# Patient Record
Sex: FEMALE | Race: WHITE | Employment: UNEMPLOYED | URBAN - METROPOLITAN AREA
[De-identification: names, ages, dates, MRNs, and addresses within clinical notes are randomized per-mention and may not be internally consistent; named-entity substitution may affect disease eponyms.]

---

## 2018-08-23 DIAGNOSIS — K21.9 GASTROESOPHAGEAL REFLUX DISEASE, ESOPHAGITIS PRESENCE NOT SPECIFIED: ICD-10-CM

## 2018-08-23 PROBLEM — R42 DIZZINESS: Status: ACTIVE | Noted: 2018-08-23

## 2018-08-23 PROBLEM — R06.00 DYSPNEA: Status: ACTIVE | Noted: 2018-08-23

## 2018-08-23 RX ORDER — FOLIC ACID 1 MG/1
TABLET ORAL DAILY
COMMUNITY
End: 2019-09-27 | Stop reason: ALTCHOICE

## 2018-08-23 RX ORDER — AMOXICILLIN 250 MG
1 CAPSULE ORAL DAILY
COMMUNITY
End: 2019-09-27 | Stop reason: ALTCHOICE

## 2018-08-23 RX ORDER — BISMUTH SUBSALICYLATE 262 MG
1 TABLET,CHEWABLE ORAL DAILY
COMMUNITY
End: 2019-09-27 | Stop reason: ALTCHOICE

## 2018-08-23 RX ORDER — MONTELUKAST SODIUM 10 MG/1
10 TABLET ORAL DAILY
COMMUNITY
End: 2019-09-27 | Stop reason: ALTCHOICE

## 2018-08-23 RX ORDER — OXYCODONE AND ACETAMINOPHEN 5; 325 MG/1; MG/1
TABLET ORAL
COMMUNITY
End: 2019-09-27 | Stop reason: ALTCHOICE

## 2018-08-23 RX ORDER — LORATADINE AND PSEUDOEPHEDRINE 10; 240 MG/1; MG/1
1 TABLET, EXTENDED RELEASE ORAL DAILY
COMMUNITY
End: 2019-09-27 | Stop reason: ALTCHOICE

## 2018-09-06 ENCOUNTER — OFFICE VISIT (OUTPATIENT)
Dept: FAMILY MEDICINE CLINIC | Age: 28
End: 2018-09-06

## 2018-09-06 VITALS
DIASTOLIC BLOOD PRESSURE: 74 MMHG | SYSTOLIC BLOOD PRESSURE: 108 MMHG | BODY MASS INDEX: 21.52 KG/M2 | HEIGHT: 61 IN | OXYGEN SATURATION: 96 % | HEART RATE: 122 BPM | WEIGHT: 114 LBS | TEMPERATURE: 98.4 F

## 2018-09-06 DIAGNOSIS — K29.70 GASTRITIS WITHOUT BLEEDING, UNSPECIFIED CHRONICITY, UNSPECIFIED GASTRITIS TYPE: ICD-10-CM

## 2018-09-06 DIAGNOSIS — N25.89 RTA (RENAL TUBULAR ACIDOSIS): Primary | ICD-10-CM

## 2018-09-06 RX ORDER — MINERAL OIL
ENEMA (ML) RECTAL
COMMUNITY

## 2018-09-06 RX ORDER — PROMETHAZINE HYDROCHLORIDE 12.5 MG/1
TABLET ORAL
COMMUNITY
End: 2018-09-06 | Stop reason: SDUPTHER

## 2018-09-06 RX ORDER — PROMETHAZINE HYDROCHLORIDE 12.5 MG/1
12.5 TABLET ORAL
Qty: 30 TAB | Refills: 0 | Status: SHIPPED | OUTPATIENT
Start: 2018-09-06 | End: 2018-10-16 | Stop reason: SDUPTHER

## 2018-09-06 RX ORDER — PANTOPRAZOLE SODIUM 40 MG/1
40 TABLET, DELAYED RELEASE ORAL DAILY
COMMUNITY
End: 2019-09-27 | Stop reason: ALTCHOICE

## 2018-09-06 RX ORDER — ELETRIPTAN HYDROBROMIDE 40 MG/1
40 TABLET, FILM COATED ORAL
COMMUNITY
End: 2019-09-27 | Stop reason: ALTCHOICE

## 2018-09-06 RX ORDER — ONDANSETRON 4 MG/1
4 TABLET, ORALLY DISINTEGRATING ORAL
COMMUNITY
End: 2019-09-27 | Stop reason: ALTCHOICE

## 2018-09-06 RX ORDER — SUCRALFATE 1 G/1
1 TABLET ORAL 4 TIMES DAILY
COMMUNITY
End: 2019-09-27 | Stop reason: ALTCHOICE

## 2018-09-06 RX ORDER — OXYCODONE HYDROCHLORIDE 5 MG/1
5 TABLET ORAL
COMMUNITY
End: 2019-09-27 | Stop reason: ALTCHOICE

## 2018-09-06 NOTE — PATIENT INSTRUCTIONS
Gastritis: Care Instructions Your Care Instructions Gastritis is a sore and upset stomach. It happens when something irritates the stomach lining. Many things can cause it. These include an infection such as the flu or something you ate or drank. Medicines or a sore on the lining of the stomach (ulcer) also can cause it. Your belly may bloat and ache. You may belch, vomit, and feel sick to your stomach. You should be able to relieve the problem by taking medicine. And it may help to change your diet. If gastritis lasts, your doctor may prescribe medicine. Follow-up care is a key part of your treatment and safety. Be sure to make and go to all appointments, and call your doctor if you are having problems. It's also a good idea to know your test results and keep a list of the medicines you take. How can you care for yourself at home? · If your doctor prescribed antibiotics, take them as directed. Do not stop taking them just because you feel better. You need to take the full course of antibiotics. · Be safe with medicines. If your doctor prescribed medicine to decrease stomach acid, take it as directed. Call your doctor if you think you are having a problem with your medicine. · Do not take any other medicine, including over-the-counter pain relievers, without talking to your doctor first. 
· If your doctor recommends over-the-counter medicine to reduce stomach acid, such as Pepcid AC, Prilosec, Tagamet HB, or Zantac 75, follow the directions on the label. · Drink plenty of fluids (enough so that your urine is light yellow or clear like water) to prevent dehydration. Choose water and other caffeine-free clear liquids. If you have kidney, heart, or liver disease and have to limit fluids, talk with your doctor before you increase the amount of fluids you drink. · Limit how much alcohol you drink. · Avoid coffee, tea, cola drinks, chocolate, and other foods with caffeine. They increase stomach acid. When should you call for help? Call 911 anytime you think you may need emergency care. For example, call if: 
  · You vomit blood or what looks like coffee grounds.  
  · You pass maroon or very bloody stools.  
 Call your doctor now or seek immediate medical care if: 
  · You start breathing fast and have not produced urine in the last 8 hours.  
  · You cannot keep fluids down.  
 Watch closely for changes in your health, and be sure to contact your doctor if: 
  · You do not get better as expected. Where can you learn more? Go to http://jesus-deepak.info/. Enter [id number] in the search box to learn more about \"Gastritis: Care Instructions. \" Current as of: May 12, 2017 Content Version: 11.7 © 9761-4107 [employer]. Care instructions adapted under license by [employer] (which disclaims liability or warranty for this information). If you have questions about a medical condition or this instruction, always ask your healthcare professional. [address] any warranty or liability for your use of this information.

## 2018-09-06 NOTE — PROGRESS NOTES
[address] [place]   [address] Phone:  [phone] Fax:  [phone] Name: Nancy Us 
:  1990 MRN:  [id number] Encounter Date:  2018 History of Present Illness: 
Nancy Us is a 32 y.o. female. She presents for hospital follow up for RTA type IV. She was hospitalized from  - 18 for renal tubular acidosis type IV. She has follow up scheduled with Dr. Nahid Mccoy on 10/10/18. She saw a nurse practitioner with that practice at the end of August where she says labs were checked and that the labs were looking better. She has been taking the potassium. She does occasionally get some nausea for which she takes phenergan. The phenergan use is decreasing but she could use a refill of this. She also has gastritis for which she has an endoscopy scheduled tomorrow with the GI doctor. She is taking protonix, carafate, and simethicone which helps her symptoms some. She denies bleeding and dark stool. Appetite is normal. She requests a refill of roxicodone that she got from the hospital. She says she was taking it for \"gastritis pain\" but that the use has decreased significantly to one every few days. Of note, patient notes she is always tachycardic since her hospitalization in . Looking at previous records, she has been slightly tachycardic at previous visits. She denies chest pain, shortness of breath, dizziness. Allergies Allergen Reactions  Ibuprofen Cold [Pseudoephedrine-Ibuprofen] Unable to Obtain  Lodine [Etodolac] Other (comments) Headache  Naproxen Unknown (comments)  Nsaids (Non-Steroidal Anti-Inflammatory Drug) Other (comments)  Topamax [Topiramate] Unknown (comments) Current Outpatient Prescriptions Medication Sig Dispense Refill  eletriptan (RELPAX) 40 mg tablet Take 40 mg by mouth once as needed. may repeat in 2 hours if necessary  fexofenadine (ALLEGRA) 180 mg tablet Take  by mouth.  ondansetron (ZOFRAN ODT) 4 mg disintegrating tablet Take 4 mg by mouth every eight (8) hours as needed for Nausea.  oxyCODONE IR (ROXICODONE) 5 mg immediate release tablet Take 5 mg by mouth every four (4) hours as needed for Pain.  pantoprazole (PROTONIX) 40 mg tablet Take 40 mg by mouth daily.  potassium citrate (UROCIT-K 5 PO) Take  by mouth.  simethicone (GAS-X PO) Take  by mouth.  sucralfate (CARAFATE) 1 gram tablet Take 1 g by mouth four (4) times daily.  promethazine (PHENERGAN) 12.5 mg tablet Take 1 Tab by mouth every six (6) hours as needed for Nausea. 30 Tab 0 There is no problem list on file for this patient. Past Medical History:  
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  
Procedure Laterality Date  HX CHOLECYSTECTOMY  HX FEMUR FRACTURE TX Social History Social History  Marital status: SINGLE Spouse name: N/A  
 Number of children: N/A  
 Years of education: N/A Social History Main Topics  Smoking status: Never Smoker  Smokeless tobacco: Never Used  Alcohol use No  
 Drug use: No  
 Sexual activity: Not Asked Other Topics Concern  None Social History Narrative  None Family History Problem Relation Age of Onset  Hypertension Mother Review of Systems Constitutional: Negative for chills and fever. Respiratory: Negative for cough. Cardiovascular: Negative for chest pain, palpitations and leg swelling. Gastrointestinal: Positive for nausea. Negative for abdominal pain, blood in stool, melena and vomiting. Physical Exam: 
Visit Vitals  /74 (BP 1 Location: Right arm, BP Patient Position: Sitting)  Pulse (!) 122  Temp 98.4 °F (36.9 °C) (Oral)  Ht 5' 1\" (1.549 m)  Wt 114 lb (51.7 kg)  LMP 09/01/2018  SpO2 96%  BMI 21.54 kg/m2 Recheck pulse by physician: 104 Physical Exam  
Constitutional: She is oriented to person, place, and time. She appears well-developed and well-nourished. Cardiovascular: Regular rhythm and normal heart sounds. Tachycardia present. Pulmonary/Chest: Effort normal and breath sounds normal. No respiratory distress. Abdominal: Soft. Bowel sounds are normal. There is no tenderness. Musculoskeletal: She exhibits no edema. Neurological: She is alert and oriented to person, place, and time. Reviewed: Medications, allergies, clinical lab test results and imaging results have been reviewed. Any abnormal findings have been addressed. Assessment/Plan: 
Encounter Diagnoses ICD-10-CM ICD-9-CM 1. RTA (renal tubular acidosis) N25.89 588.89  
2. Gastritis without bleeding, unspecified chronicity, unspecified gastritis type K29.70 535.50 Orders Placed This Encounter  promethazine (PHENERGAN) 12.5 mg tablet Will not prescribe roxicodone at this time. She has follow up with GI tomorrow. Diagnoses, tests, plan, and follow up discussed with patient. Patient expressed agreement and understanding. All questions were answered. Follow-up Disposition: 
Return in about 6 weeks (around 10/18/2018). Discussed with Dr. Angela Cramer who agrees. Patt Reynolds MD 
9/6/2018

## 2018-09-06 NOTE — PROGRESS NOTES
1. Have you been to the ER, urgent care clinic since your last visit? Hospitalized since your last visit? [facility] discharged August 23 
 
2. Have you seen or consulted any other health care providers outside of the [address] [place] [address] since your last visit? Include any pap smears or colon screening. Cydney, Dhiraj Nephjustyn

## 2018-09-06 NOTE — MR AVS SNAPSHOT
[address] [place], [place] [address] 
[phone] Patient: Eb Cruz MRN: [id number] FYL:[birth year] Visit Information Date & Time Provider Department Dept. Phone Encounter #  
 9/6/2018  2:00 PM Luis Fernando Cross MD [facility] Medicine [phone] [id number] Follow-up Instructions Return in about 6 weeks (around 10/18/2018). Allergies as of 9/6/2018  Review Complete On: 9/6/2018 By: Luis Fernando Cross MD  
  
 Severity Noted Reaction Type Reactions Ibuprofen Cold [Pseudoephedrine-ibuprofen]  09/06/2018    Unable to Obtain Lodine [Etodolac]  09/06/2018    Other (comments) Headache Naproxen  09/06/2018    Unknown (comments) Nsaids (Non-steroidal Anti-inflammatory Drug)  09/06/2018    Other (comments) Topamax [Topiramate]  09/06/2018    Unknown (comments) Current Immunizations  Never Reviewed No immunizations on file. Not reviewed this visit You Were Diagnosed With   
  
 Codes Comments RTA (renal tubular acidosis)    -  Primary ICD-10-CM: N25.89 ICD-9-CM: 588.89 Gastritis without bleeding, unspecified chronicity, unspecified gastritis type     ICD-10-CM: K29.70 ICD-9-CM: 535.50 Vitals BP Pulse Temp Height(growth percentile) Weight(growth percentile) LMP  
 108/74 (BP 1 Location: Right arm, BP Patient Position: Sitting) (!) 122 98.4 °F (36.9 °C) (Oral) 5' 1\" (1.549 m) 114 lb (51.7 kg) 09/01/2018 SpO2 BMI OB Status Smoking Status 96% 21.54 kg/m2 Having regular periods Never Smoker Vitals History BMI and BSA Data Body Mass Index Body Surface Area  
 21.54 kg/m 2 1.49 m 2 Preferred Pharmacy Pharmacy Name Phone [phone] Your Updated Medication List  
  
   
This list is accurate as of 9/6/18  3:20 PM.  Always use your most recent med list.  
  
  
 CARAFATE 1 gram tablet Generic drug:  sucralfate Take 1 g by mouth four (4) times daily. fexofenadine 180 mg tablet Commonly known as:  Rella Soulier Take  by mouth. GAS-X PO Take  by mouth.  
  
 promethazine 12.5 mg tablet Commonly known as:  PHENERGAN Take 1 Tab by mouth every six (6) hours as needed for Nausea. PROTONIX 40 mg tablet Generic drug:  pantoprazole Take 40 mg by mouth daily. RELPAX 40 mg tablet Generic drug:  eletriptan Take 40 mg by mouth once as needed. may repeat in 2 hours if necessary ROXICODONE 5 mg immediate release tablet Generic drug:  oxyCODONE IR Take 5 mg by mouth every four (4) hours as needed for Pain. UROCIT-K 5 PO Take  by mouth. ZOFRAN ODT 4 mg disintegrating tablet Generic drug:  ondansetron Take 4 mg by mouth every eight (8) hours as needed for Nausea. Prescriptions Sent to Pharmacy Refills  
 promethazine (PHENERGAN) 12.5 mg tablet 0 Sig: Take 1 Tab by mouth every six (6) hours as needed for Nausea. Class: Normal  
 Pharmacy: [id number] #: [phone] Route: Oral  
  
Follow-up Instructions Return in about 6 weeks (around 10/18/2018). Patient Instructions Gastritis: Care Instructions Your Care Instructions Gastritis is a sore and upset stomach. It happens when something irritates the stomach lining. Many things can cause it. These include an infection such as the flu or something you ate or drank. Medicines or a sore on the lining of the stomach (ulcer) also can cause it. Your belly may bloat and ache. You may belch, vomit, and feel sick to your stomach. You should be able to relieve the problem by taking medicine. And it may help to change your diet. If gastritis lasts, your doctor may prescribe medicine. Follow-up care is a key part of your treatment and safety. Be sure to make and go to all appointments, and call your doctor if you are having problems. It's also a good idea to know your test results and keep a list of the medicines you take. How can you care for yourself at home? · If your doctor prescribed antibiotics, take them as directed. Do not stop taking them just because you feel better. You need to take the full course of antibiotics. · Be safe with medicines. If your doctor prescribed medicine to decrease stomach acid, take it as directed. Call your doctor if you think you are having a problem with your medicine. · Do not take any other medicine, including over-the-counter pain relievers, without talking to your doctor first. 
· If your doctor recommends over-the-counter medicine to reduce stomach acid, such as Pepcid AC, Prilosec, Tagamet HB, or Zantac 75, follow the directions on the label. · Drink plenty of fluids (enough so that your urine is light yellow or clear like water) to prevent dehydration. Choose water and other caffeine-free clear liquids. If you have kidney, heart, or liver disease and have to limit fluids, talk with your doctor before you increase the amount of fluids you drink. · Limit how much alcohol you drink. · Avoid coffee, tea, cola drinks, chocolate, and other foods with caffeine. They increase stomach acid. When should you call for help? Call 911 anytime you think you may need emergency care. For example, call if: 
  · You vomit blood or what looks like coffee grounds.  
  · You pass maroon or very bloody stools.  
 Call your doctor now or seek immediate medical care if: 
  · You start breathing fast and have not produced urine in the last 8 hours.  
  · You cannot keep fluids down.  
 Watch closely for changes in your health, and be sure to contact your doctor if: 
  · You do not get better as expected. Where can you learn more? Go to http://jesus-deepak.info/. Enter [id number] in the search box to learn more about \"Gastritis: Care Instructions. \" Current as of: May 12, 2017 Content Version: 11.7 © 5851-7951 [employer]. Care instructions adapted under license by [employer] (which disclaims liability or warranty for this information). If you have questions about a medical condition or this instruction, always ask your healthcare professional. [address] any warranty or liability for your use of this information. Introducing hospitals & HEALTH SERVICES! [facility] introduces GIROPTIC patient portal. Now you can access parts of your medical record, email your doctor's office, and request medication refills online. 1. In your internet browser, go to https://"[employer]. [employer]/"[employer] 2. Click on the First Time User? Click Here link in the Sign In box. You will see the New Member Sign Up page. 3. Enter your GIROPTIC Access Code exactly as it appears below. You will not need to use this code after youve completed the sign-up process. If you do not sign up before the expiration date, you must request a new code. · GIROPTIC Access Code: [email or web address] Expires: 12/5/2018  3:20 PM 
 
4. Enter the last four digits of your Social Security Number (xxxx) and Date of Birth (mm/dd/yyyy) as indicated and click Submit. You will be taken to the next sign-up page. 5. Create a Geolab-ITt ID. This will be your GIROPTIC login ID and cannot be changed, so think of one that is secure and easy to remember. 6. Create a GIROPTIC password. You can change your password at any time. 7. Enter your Password Reset Question and Answer. This can be used at a later time if you forget your password. 8. Enter your e-mail address. You will receive e-mail notification when new information is available in [address] 9. Click Sign Up. You can now view and download portions of your medical record. 10. Click the Download Summary menu link to download a portable copy of your medical information. If you have questions, please visit the Frequently Asked Questions section of the Radius website. Remember, Radius is NOT to be used for urgent needs. For medical emergencies, dial 911. Now available from your iPhone and Android! Please provide this summary of care documentation to your next provider. If you have any questions after today's visit, please call [phone].

## 2018-09-25 LAB — CREATININE, EXTERNAL: 0.46

## 2018-09-27 LAB — CREATININE, EXTERNAL: 0.41

## 2018-10-04 LAB — CREATININE, EXTERNAL: 0.6

## 2018-10-16 ENCOUNTER — OFFICE VISIT (OUTPATIENT)
Dept: FAMILY MEDICINE CLINIC | Age: 28
End: 2018-10-16

## 2018-10-16 VITALS
HEIGHT: 61 IN | WEIGHT: 111 LBS | OXYGEN SATURATION: 96 % | DIASTOLIC BLOOD PRESSURE: 78 MMHG | TEMPERATURE: 97.8 F | RESPIRATION RATE: 2 BRPM | HEART RATE: 119 BPM | BODY MASS INDEX: 20.96 KG/M2 | SYSTOLIC BLOOD PRESSURE: 110 MMHG

## 2018-10-16 DIAGNOSIS — R10.84 GENERALIZED ABDOMINAL PAIN: ICD-10-CM

## 2018-10-16 DIAGNOSIS — R11.0 NAUSEA: Primary | ICD-10-CM

## 2018-10-16 DIAGNOSIS — R00.0 TACHYCARDIA: ICD-10-CM

## 2018-10-16 RX ORDER — POTASSIUM CITRATE 5 MEQ/1
5 TABLET, EXTENDED RELEASE ORAL DAILY
Refills: 4 | COMMUNITY
Start: 2018-10-12

## 2018-10-16 RX ORDER — SODIUM BICARBONATE 650 MG/1
TABLET ORAL
Refills: 1 | COMMUNITY
Start: 2018-10-11

## 2018-10-16 RX ORDER — OXYCODONE AND ACETAMINOPHEN 5; 325 MG/1; MG/1
1 TABLET ORAL
COMMUNITY
Start: 2018-09-27 | End: 2019-09-27 | Stop reason: ALTCHOICE

## 2018-10-16 RX ORDER — MINERAL OIL
180 ENEMA (ML) RECTAL
COMMUNITY
End: 2018-10-16

## 2018-10-16 RX ORDER — PROMETHAZINE HYDROCHLORIDE 12.5 MG/1
12.5 TABLET ORAL
COMMUNITY
End: 2019-09-27 | Stop reason: ALTCHOICE

## 2018-10-16 RX ORDER — OXYCODONE HYDROCHLORIDE 5 MG/1
5 TABLET ORAL
COMMUNITY
Start: 2018-08-23 | End: 2019-09-27 | Stop reason: ALTCHOICE

## 2018-10-16 RX ORDER — ELETRIPTAN HYDROBROMIDE 40 MG/1
40 TABLET, FILM COATED ORAL
COMMUNITY
End: 2018-10-16

## 2018-10-16 RX ORDER — PROMETHAZINE HYDROCHLORIDE 12.5 MG/1
12.5 TABLET ORAL
Qty: 30 TAB | Refills: 0 | Status: SHIPPED | OUTPATIENT
Start: 2018-10-16 | End: 2019-09-27 | Stop reason: ALTCHOICE

## 2018-10-16 RX ORDER — PANTOPRAZOLE SODIUM 40 MG/1
40 TABLET, DELAYED RELEASE ORAL
COMMUNITY
Start: 2018-07-06 | End: 2019-09-27 | Stop reason: ALTCHOICE

## 2018-10-16 NOTE — PROGRESS NOTES
10/17/2018   Chief Complaint   Patient presents with    Follow-up     Acidosis  &   Esophigitis       HPI:  Suzy Giron is a 32 y.o. female who presents to clinic today for Hospital follow up. Records from Hospital were available and were reviewed if available, otherwise request was sent by nurse at time of visit. Patient was admitted on 9/24 and discharged on 9/27 for abdominal pain. She had been admitted previously for similar symptoms. GI was consulted and patient was restarted on PPI, and a gastric emptying stud was done which was normal, symptoms improved and patient was discharged        Labs included: CBC, BMP, UA which were normal other than elevated beta hydroxybutyrate levels  Imaging included: CT Scan which was normal, gastric emptying study was normal  Treatments included: PPI  Procedures included: None      Patients past medical, surgical and family histories were reviewed. Allergies and Medications reviewed and updated. Review of Systems -   General ROS: negative for - chills or fever  Respiratory ROS: negative for - cough, shortness of breath or wheezing  Cardiovascular ROS: negative for - chest pain or palpitations  Gastrointestinal ROS: negative for - abdominal pain, constipation, diarrhea, nausea, vomiting  Neurological ROS: negative for - dizziness or headaches  Dermatological ROS: negative for - rash or skin lesion changes      Vitals:  Vitals:    10/16/18 1031   BP: 110/78   Pulse: (!) 119   Resp: (!) 2   Temp: 97.8 °F (36.6 °C)   TempSrc: Oral   SpO2: 96%   Weight: 111 lb (50.3 kg)   Height: 5' 1\" (1.549 m)     Body mass index is 20.97 kg/m². Of note RR was entered in error    Objective  General: Patient alert and oriented and in NAD  HEENT: PER/EOMI, no conjunctival pallor or scleral icterus. No thyromegaly or cervical lymphadenopathy  Heart: Tahcycardic and regular rhythm, No murmurs, rubs or gallops.    Lungs: Clear to auscultation bilaterally, no wheezing, rales or rhonchi  Abd: +BS, non-tender, non-distended  Ext: No edema  Skin: No rashes or lesions noted on exposed skin  Neuro: AAOx3  Psych: Appropriate mood and affect    No results found for this or any previous visit (from the past 24 hour(s)). Assessment and Plan:  1. Nausea    Stable, but need refill    - promethazine (PHENERGAN) 12.5 mg tablet; Take 1 Tab by mouth every six (6) hours as needed for Nausea. Dispense: 30 Tab; Refill: 0    2. Tachycardia  CHronic, has had previous extensive cardiac workup by Cardiology as inpatient during [facility], and was cleared with generally normal EKGs and echocardiograms. Pt continues to have chest pain with movement at times, will get further imaging to rule out an lung pathology    - CT CHEST W WO CONT; Future    3. Generalized abdominal pain  Stable, will follow up with GI for further workup      I have discussed the diagnosis with the patient and the intended plan as seen in the above orders. She has expressed understanding. The patient has received an after-visit summary and questions were answered concerning future plans. I have discussed medication side effects and warnings with the patient as well. Follow-up Disposition:  Return if symptoms worsen or fail to improve.     Electronically Signed: Charles Godinez MD

## 2018-10-16 NOTE — PROGRESS NOTES
Kimber Mcguire is a 32 y.o. female  Chief Complaint   Patient presents with    Follow-up     Acidosis  &   Esophigitis     1. Have you been to the ER, urgent care clinic since your last visit? Hospitalized since your last visit? YES   [place] H/O  STOMACH PAINS      2. Have you seen or consulted any other health care providers outside of the [address] [place] [address] since your last visit? Include any pap smears or colon screening. PAP 2017    Identified pt with two pt identifiers(name and ). Reviewed record in preparation for visit and have obtained necessary documentation.   Chief Complaint   Patient presents with    Follow-up     Acidosis  &   Esophigitis        Health Maintenance Due   Topic    DTaP/Tdap/Td series (1 - Tdap)    PAP AKA CERVICAL CYTOLOGY     Influenza Age 5 to Adult

## 2018-10-16 NOTE — MR AVS SNAPSHOT
[address] [id number] Patient: Marian Berumen MRN: [id number] Visit Information Date & Time Provider Department Dept. Phone Encounter #  
 10/16/2018 10:30 AM Ruchi Ryan MD [facility] [phone] [id number] Follow-up Instructions Return if symptoms worsen or fail to improve. Upcoming Health Maintenance Date Due DTaP/Tdap/Td series (1 - Tdap) 11/2/2011 PAP AKA CERVICAL CYTOLOGY 11/2/2011 Influenza Age 5 to Adult 8/1/2018 Allergies as of 10/16/2018  Review Complete On: 10/16/2018 By: Jovita Gabriel Severity Noted Reaction Type Reactions Ibuprofen Cold [Pseudoephedrine-ibuprofen]  08/23/2018    Other (comments) Ibuprofen Cold [Pseudoephedrine-ibuprofen]  09/06/2018    Unable to Obtain Lodine [Etodolac]  08/23/2018    Other (comments) Headache Lodine [Etodolac]  09/06/2018    Other (comments) Headache Naprosyn [Naproxen]  08/23/2018    Other (comments) Naproxen  09/06/2018    Unknown (comments) Nsaids (Non-steroidal Anti-inflammatory Drug)  09/06/2018    Nausea and Vomiting Topamax [Topiramate]  08/23/2018    Other (comments) Abdominal pain, Lactic Acidosis Topamax [Topiramate]  09/06/2018    Unknown (comments) Current Immunizations  Never Reviewed No immunizations on file. Not reviewed this visit You Were Diagnosed With   
  
 Codes Comments Nausea    -  Primary ICD-10-CM: R11.0 ICD-9-CM: 787.02 Tachycardia     ICD-10-CM: R00.0 ICD-9-CM: 981. 0 Vitals BP Pulse Temp Resp Height(growth percentile) Weight(growth percentile) 110/78 (!) 119 97.8 °F (36.6 °C) (Oral) (!) 2 5' 1\" (1.549 m) 111 lb (50.3 kg) SpO2 BMI OB Status Smoking Status 96% 20.97 kg/m2 Having regular periods Never Smoker Vitals History BMI and BSA Data Body Mass Index Body Surface Area 20.97 kg/m 2 1.47 m 2 Preferred Pharmacy Pharmacy Name Phone [id number] [phone] Your Updated Medication List  
  
   
This list is accurate as of 10/16/18 11:30 AM.  Always use your most recent med list.  
  
  
  
  
 CARAFATE 1 gram tablet Generic drug:  sucralfate Take 1 g by mouth four (4) times daily. CLARITIN-D 24 HOUR  mg per tablet Generic drug:  loratadine-pseudoephedrine Take 1 Tab by mouth daily. fexofenadine 180 mg tablet Commonly known as:  Gelene Backbone Take  by mouth. folic acid 1 mg tablet Commonly known as:  Google Take  by mouth daily. GAS-X PO Take  by mouth.  
  
 multivitamin tablet Commonly known as:  ONE A DAY Take 1 Tab by mouth daily. * PERCOCET 5-325 mg per tablet Generic drug:  oxyCODONE-acetaminophen Take  by mouth every four (4) hours as needed for Pain. * oxyCODONE-acetaminophen 5-325 mg per tablet Commonly known as:  PERCOCET  
1 Tab. * promethazine 12.5 mg tablet Commonly known as:  PHENERGAN  
12.5 mg.  
  
 * promethazine 12.5 mg tablet Commonly known as:  PHENERGAN Take 1 Tab by mouth every six (6) hours as needed for Nausea. * PROTONIX 40 mg tablet Generic drug:  pantoprazole Take 40 mg by mouth daily. * pantoprazole 40 mg tablet Commonly known as:  PROTONIX  
40 mg. RELPAX 40 mg tablet Generic drug:  eletriptan Take 40 mg by mouth once as needed. may repeat in 2 hours if necessary * ROXICODONE 5 mg immediate release tablet Generic drug:  oxyCODONE IR Take 5 mg by mouth every four (4) hours as needed for Pain. * oxyCODONE IR 5 mg immediate release tablet Commonly known as:  ROXICODONE  
5 mg. SENOKOT-S 8.6-50 mg per tablet Generic drug:  senna-docusate Take 1 Tab by mouth daily. SINGULAIR 10 mg tablet Generic drug:  montelukast  
 Take 10 mg by mouth daily. sodium bicarbonate 650 mg tablet TAKE 1 TABLET BY MOUTH AFTER MEALS AS DIRECTED * UROCIT-K 5 PO Take  by mouth. * potassium citrate 5 mEq (540 mg) Tber tablet Commonly known as:  UROCIT-K5 TAKE 2 TABLETS BY MOUTH WITH MEALS  
  
 ZOFRAN ODT 4 mg disintegrating tablet Generic drug:  ondansetron Take 4 mg by mouth every eight (8) hours as needed for Nausea. * Notice: This list has 10 medication(s) that are the same as other medications prescribed for you. Read the directions carefully, and ask your doctor or other care provider to review them with you. Prescriptions Sent to Pharmacy Refills  
 promethazine (PHENERGAN) 12.5 mg tablet 0 Sig: Take 1 Tab by mouth every six (6) hours as needed for Nausea. Class: Normal  
 Pharmacy: [facility]/pharmacy #[id number] #: [phone] Route: Oral  
  
Follow-up Instructions Return if symptoms worsen or fail to improve. To-Do List   
 10/17/2018 Imaging:  CT CHEST W WO CONT Patient Instructions Gastritis: Care Instructions Your Care Instructions Gastritis is a sore and upset stomach. It happens when something irritates the stomach lining. Many things can cause it. These include an infection such as the flu or something you ate or drank. Medicines or a sore on the lining of the stomach (ulcer) also can cause it. Your belly may bloat and ache. You may belch, vomit, and feel sick to your stomach. You should be able to relieve the problem by taking medicine. And it may help to change your diet. If gastritis lasts, your doctor may prescribe medicine. Follow-up care is a key part of your treatment and safety. Be sure to make and go to all appointments, and call your doctor if you are having problems. It's also a good idea to know your test results and keep a list of the medicines you take. How can you care for yourself at home? · If your doctor prescribed antibiotics, take them as directed. Do not stop taking them just because you feel better. You need to take the full course of antibiotics. · Be safe with medicines. If your doctor prescribed medicine to decrease stomach acid, take it as directed. Call your doctor if you think you are having a problem with your medicine. · Do not take any other medicine, including over-the-counter pain relievers, without talking to your doctor first. 
· If your doctor recommends over-the-counter medicine to reduce stomach acid, such as Pepcid AC, Prilosec, Tagamet HB, or Zantac 75, follow the directions on the label. · Drink plenty of fluids (enough so that your urine is light yellow or clear like water) to prevent dehydration. Choose water and other caffeine-free clear liquids. If you have kidney, heart, or liver disease and have to limit fluids, talk with your doctor before you increase the amount of fluids you drink. · Limit how much alcohol you drink. · Avoid coffee, tea, cola drinks, chocolate, and other foods with caffeine. They increase stomach acid. When should you call for help? Call 911 anytime you think you may need emergency care. For example, call if: 
  · You vomit blood or what looks like coffee grounds.  
  · You pass maroon or very bloody stools.  
 Call your doctor now or seek immediate medical care if: 
  · You start breathing fast and have not produced urine in the last 8 hours.  
  · You cannot keep fluids down.  
 Watch closely for changes in your health, and be sure to contact your doctor if: 
  · You do not get better as expected. Where can you learn more? Go to http://jesus-deepak.info/. Enter [id number] in the search box to learn more about \"Gastritis: Care Instructions. \" Current as of: March 28, 2018 Content Version: 11.8 © 9340-3123 Healthwise, Incorporated.  Care instructions adapted under license by [address] (which disclaims liability or warranty for this information). If you have questions about a medical condition or this instruction, always ask your healthcare professional. [address] any warranty or liability for your use of this information. Introducing [facility] & HEALTH SERVICES! [facility] introduces C[employer] patient portal. Now you can access parts of your medical record, email your doctor's office, and request medication refills online. 1. In your internet browser, go to [email or web address] 2. Click on the First Time User? Click Here link in the Sign In box. You will see the New Member Sign Up page. 3. Enter your Cinexio Access Code exactly as it appears below. You will not need to use this code after youve completed the sign-up process. If you do not sign up before the expiration date, you must request a new code. · Cine[employer] Access Code: [email or web address] Expires: 12/5/2018  3:20 PM 
 
4. Enter the last four digits of your Social Security Number (xxxx) and Date of Birth (mm/dd/yyyy) as indicated and click Submit. You will be taken to the next sign-up page. 5. Create a Cinexio ID. This will be your Cinexio login ID and cannot be changed, so think of one that is secure and easy to remember. 6. Create a [employer] password. You can change your password at any time. 7. Enter your Password Reset Question and Answer. This can be used at a later time if you forget your password. 8. Enter your e-mail address. You will receive e-mail notification when new information is available in [address] 9. Click Sign Up. You can now view and download portions of your medical record. 10. Click the Download Summary menu link to download a portable copy of your medical information. If you have questions, please visit the Frequently Asked Questions section of the [employer] website.  Remember, [employer]io is NOT to be used for urgent needs. For medical emergencies, dial 911. Now available from your iPhone and Android! Please provide this summary of care documentation to your next provider. If you have any questions after today's visit, please call [phone].

## 2018-10-16 NOTE — PATIENT INSTRUCTIONS
Gastritis: Care Instructions  Your Care Instructions    Gastritis is a sore and upset stomach. It happens when something irritates the stomach lining. Many things can cause it. These include an infection such as the flu or something you ate or drank. Medicines or a sore on the lining of the stomach (ulcer) also can cause it. Your belly may bloat and ache. You may belch, vomit, and feel sick to your stomach. You should be able to relieve the problem by taking medicine. And it may help to change your diet. If gastritis lasts, your doctor may prescribe medicine. Follow-up care is a key part of your treatment and safety. Be sure to make and go to all appointments, and call your doctor if you are having problems. It's also a good idea to know your test results and keep a list of the medicines you take. How can you care for yourself at home? · If your doctor prescribed antibiotics, take them as directed. Do not stop taking them just because you feel better. You need to take the full course of antibiotics. · Be safe with medicines. If your doctor prescribed medicine to decrease stomach acid, take it as directed. Call your doctor if you think you are having a problem with your medicine. · Do not take any other medicine, including over-the-counter pain relievers, without talking to your doctor first.  · If your doctor recommends over-the-counter medicine to reduce stomach acid, such as Pepcid AC, Prilosec, Tagamet HB, or Zantac 75, follow the directions on the label. · Drink plenty of fluids (enough so that your urine is light yellow or clear like water) to prevent dehydration. Choose water and other caffeine-free clear liquids. If you have kidney, heart, or liver disease and have to limit fluids, talk with your doctor before you increase the amount of fluids you drink. · Limit how much alcohol you drink. · Avoid coffee, tea, cola drinks, chocolate, and other foods with caffeine.  They increase stomach acid.  When should you call for help? Call 911 anytime you think you may need emergency care. For example, call if:    · You vomit blood or what looks like coffee grounds.     · You pass maroon or very bloody stools.    Call your doctor now or seek immediate medical care if:    · You start breathing fast and have not produced urine in the last 8 hours.     · You cannot keep fluids down.    Watch closely for changes in your health, and be sure to contact your doctor if:    · You do not get better as expected. Where can you learn more? Go to http://jesus-deepak.info/. Enter [id number] in the search box to learn more about \"Gastritis: Care Instructions. \"  Current as of: March 28, 2018  Content Version: 11.8  © 3087-4069 Healthwise, Incorporated. Care instructions adapted under license by [employer] (which disclaims liability or warranty for this information). If you have questions about a medical condition or this instruction, always ask your healthcare professional. [address] any warranty or liability for your use of this information.

## 2019-09-27 ENCOUNTER — OFFICE VISIT (OUTPATIENT)
Dept: FAMILY MEDICINE CLINIC | Age: 29
End: 2019-09-27

## 2019-09-27 VITALS
WEIGHT: 141 LBS | DIASTOLIC BLOOD PRESSURE: 88 MMHG | HEART RATE: 87 BPM | RESPIRATION RATE: 20 BRPM | BODY MASS INDEX: 26.62 KG/M2 | HEIGHT: 61 IN | TEMPERATURE: 98 F | OXYGEN SATURATION: 99 % | SYSTOLIC BLOOD PRESSURE: 121 MMHG

## 2019-09-27 DIAGNOSIS — J01.10 ACUTE NON-RECURRENT FRONTAL SINUSITIS: Primary | ICD-10-CM

## 2019-09-27 RX ORDER — AZITHROMYCIN 250 MG/1
TABLET, FILM COATED ORAL
Qty: 6 TAB | Refills: 0 | Status: SHIPPED | OUTPATIENT
Start: 2019-09-27 | End: 2019-10-02

## 2019-09-27 NOTE — PATIENT INSTRUCTIONS
Sinusitis: Care Instructions  Your Care Instructions    Sinusitis is an infection of the lining of the sinus cavities in your head. Sinusitis often follows a cold. It causes pain and pressure in your head and face. In most cases, sinusitis gets better on its own in 1 to 2 weeks. But some mild symptoms may last for several weeks. Sometimes antibiotics are needed. Follow-up care is a key part of your treatment and safety. Be sure to make and go to all appointments, and call your doctor if you are having problems. It's also a good idea to know your test results and keep a list of the medicines you take. How can you care for yourself at home? · Take an over-the-counter pain medicine, such as acetaminophen (Tylenol), ibuprofen (Advil, Motrin), or naproxen (Aleve). Read and follow all instructions on the label. · If the doctor prescribed antibiotics, take them as directed. Do not stop taking them just because you feel better. You need to take the full course of antibiotics. · Be careful when taking over-the-counter cold or flu medicines and Tylenol at the same time. Many of these medicines have acetaminophen, which is Tylenol. Read the labels to make sure that you are not taking more than the recommended dose. Too much acetaminophen (Tylenol) can be harmful. · Breathe warm, moist air from a steamy shower, a hot bath, or a sink filled with hot water. Avoid cold, dry air. Using a humidifier in your home may help. Follow the directions for cleaning the machine. · Use saline (saltwater) nasal washes to help keep your nasal passages open and wash out mucus and bacteria. You can buy saline nose drops at a grocery store or drugstore. Or you can make your own at home by adding 1 teaspoon of salt and 1 teaspoon of baking soda to 2 cups of distilled water. If you make your own, fill a bulb syringe with the solution, insert the tip into your nostril, and squeeze gently. [address] your nose.   · Put a hot, wet towel or a warm gel pack on your face 3 or 4 times a day for 5 to 10 minutes each time. · Try a decongestant nasal spray like oxymetazoline (Afrin). Do not use it for more than 3 days in a row. Using it for more than 3 days can make your congestion worse. When should you call for help? Call your doctor now or seek immediate medical care if:    · You have new or worse swelling or redness in your face or around your eyes.     · You have a new or higher fever.    Watch closely for changes in your health, and be sure to contact your doctor if:    · You have new or worse facial pain.     · The mucus from your nose becomes thicker (like pus) or has new blood in it.     · You are not getting better as expected. Where can you learn more? Go to http://jesus-deepak.info/. Enter N272 in the search box to learn more about \"Sinusitis: Care Instructions. \"  Current as of: October 21, 2018  Content Version: 12.2  © 3127-2203 [employer]orporated. Care instructions adapted under license by [employer] (which disclaims liability or warranty for this information). If you have questions about a medical condition or this instruction, always ask your healthcare professional. [address] [address] any warranty or liability for your use of this information.

## 2019-09-27 NOTE — PROGRESS NOTES
1. Have you been to the ER, urgent care clinic since your last visit? Hospitalized since your last visit? No    2. Have you seen or consulted any other health care providers outside of the [address] [place] [address] since your last visit? Include any pap smears or colon screening.  No

## 2019-09-27 NOTE — PROGRESS NOTES
Assessment/Plan:     Diagnoses and all orders for this visit:    1. Acute non-recurrent frontal sinusitis  -     azithromycin (ZITHROMAX) 250 mg tablet; Take 2 tablets today, then take 1 tablet daily  - Worsening, start axithromycin as directed, symptom management as discussed. RTC if symptoms do not begin to improve in the next 4-5 days. Discussed expected course/resolution/complications of diagnosis in detail with patient. Medication risks/benefits/costs/interactions/alternatives discussed with patient. Pt was given after visit summary which includes diagnoses, current medications & vitals. Pt expressed understanding with the diagnosis and plan        Subjective:      Gilbert Klein is a 29 y.o. female who presents for had concerns including Sinus Infection and Pressure Behind the Eyes. Sinus Pain  Patient complains of right ear pressure/pain, congestion, facial pain, headache described as dull and no  fever. Symptoms include facial pain with no fever, chills, or night sweats. Onset of symptoms was 1 week ago, gradually worsening since that time. She is drinking plenty of fluids. .  Past history is significant for no history of pneumonia or bronchitis. Patient is non-smoker. Takes daily Raymond's sinex    Current Outpatient Medications   Medication Sig Dispense Refill    galcanezumab-gnlm (EMGALITY SYRINGE) 120 mg/mL syrg by SubCUTAneous route. Inject monthly      phenylephrine HCl (SINEX REGULAR NA) Take  by mouth.  azithromycin (ZITHROMAX) 250 mg tablet Take 2 tablets today, then take 1 tablet daily 6 Tab 0    potassium citrate (UROCIT-K5) 5 mEq (540 mg) TbER tablet Take 5 mEq by mouth daily. 4    sodium bicarbonate 650 mg tablet TAKE 1 TABLET BY MOUTH AFTER MEALS AS DIRECTED  1    fexofenadine (ALLEGRA) 180 mg tablet Take  by mouth.  oxyCODONE IR (ROXICODONE) 5 mg immediate release tablet 5 mg.  oxyCODONE-acetaminophen (PERCOCET) 5-325 mg per tablet 1 Tab.       pantoprazole (PROTONIX) 40 mg tablet 40 mg.  promethazine (PHENERGAN) 12.5 mg tablet 12.5 mg.  promethazine (PHENERGAN) 12.5 mg tablet Take 1 Tab by mouth every six (6) hours as needed for Nausea. 30 Tab 0    eletriptan (RELPAX) 40 mg tablet Take 40 mg by mouth once as needed. may repeat in 2 hours if necessary      ondansetron (ZOFRAN ODT) 4 mg disintegrating tablet Take 4 mg by mouth every eight (8) hours as needed for Nausea.  oxyCODONE IR (ROXICODONE) 5 mg immediate release tablet Take 5 mg by mouth every four (4) hours as needed for Pain.  pantoprazole (PROTONIX) 40 mg tablet Take 40 mg by mouth daily.  potassium citrate (UROCIT-K 5 PO) Take  by mouth.  simethicone (GAS-X PO) Take  by mouth.  sucralfate (CARAFATE) 1 gram tablet Take 1 g by mouth four (4) times daily.  montelukast (SINGULAIR) 10 mg tablet Take 10 mg by mouth daily.  folic acid (FOLVITE) 1 mg tablet Take  by mouth daily.  multivitamin (ONE A DAY) tablet Take 1 Tab by mouth daily.  oxyCODONE-acetaminophen (PERCOCET) 5-325 mg per tablet Take  by mouth every four (4) hours as needed for Pain.  senna-docusate (SENOKOT-S) 8.6-50 mg per tablet Take 1 Tab by mouth daily.  loratadine-pseudoephedrine (CLARITIN-D 24 HOUR)  mg per tablet Take 1 Tab by mouth daily.          Allergies   Allergen Reactions    Ibuprofen Cold [Pseudoephedrine-Ibuprofen] Other (comments)    Ibuprofen Cold [Pseudoephedrine-Ibuprofen] Unable to Obtain    Lodine [Etodolac] Other (comments)     Headache    Lodine [Etodolac] Other (comments)     Headache      Naprosyn [Naproxen] Other (comments)    Naproxen Unknown (comments)    Nsaids (Non-Steroidal Anti-Inflammatory Drug) Nausea and Vomiting    Topamax [Topiramate] Other (comments)     Abdominal pain, Lactic Acidosis    Topamax [Topiramate] Unknown (comments)     Past Medical History:   Diagnosis Date    [facility] [address]     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; 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; right side of brain; 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;    NSAID induced gastritis     Peptic ulcer     Peptic ulcer disease      Past Surgical History:   Procedure Laterality Date    HX CHOLECYSTECTOMY      HX FEMUR FRACTURE [place]       Family History   Problem Relation Age of Onset    Hypertension Mother      Social History     Socioeconomic History    Marital status: SINGLE     Spouse name: Not on file    Number of children: Not on file    Years of education: Not on file    Highest education level: Not on file   Occupational History    Not on file   Social Needs    Financial resource strain: Not on file    Food insecurity:     Worry: Not on file     Inability: Not on file    Transportation needs:     Medical: Not on file     Non-medical: Not on file   Tobacco Use    Smoking status: Never Smoker    Smokeless tobacco: Never Used   Substance and Sexual Activity    Alcohol use: No    Drug use: No    Sexual activity: Not on file   Lifestyle    Physical activity:     Days per week: Not on file     Minutes per session: Not on file    Stress: Not on file   Relationships    Social connections:     Talks on phone: Not on file     Gets together: Not on file     Attends Jew service: Not on file     Active member of club or organization: Not on file     Attends meetings of clubs or organizations: Not on file     Relationship status: Not on file    Intimate partner violence:     Fear of current or ex partner: Not on file     Emotionally abused: Not on file     Physically abused: Not on file     Forced sexual activity: Not on file   Other Topics Concern    Not on file   Social History Narrative    ** Merged History Encounter **            HPI      ROS:   Review of Systems   Constitutional: Negative for chills, fever and malaise/fatigue.    HENT: Positive for congestion, ear pain and sinus pain. Respiratory: Negative for cough and shortness of breath. Cardiovascular: Negative for chest pain, palpitations and leg swelling. Neurological: Negative for dizziness and headaches. Objective:     Visit Vitals  /88 (BP 1 Location: Right arm, BP Patient Position: Sitting)   Pulse 87   Temp 98 °F (36.7 °C) (Oral)   Resp 20   Ht 5' 1\" (1.549 m)   Wt 141 lb (64 kg)   LMP 09/22/2019   SpO2 99%   BMI 26.64 kg/m²         Vitals and Nurse Documentation reviewed. Physical Exam   Constitutional: She is oriented to person, place, and time and well-developed, well-nourished, and in no distress. Vital signs are normal. No distress. HENT:   Right Ear: Tympanic membrane is not erythematous and not bulging. No middle ear effusion. Left Ear: Tympanic membrane is not erythematous and not bulging. No middle ear effusion. Nose: No mucosal edema. Right sinus exhibits no maxillary sinus tenderness and no frontal sinus tenderness. Left sinus exhibits no maxillary sinus tenderness and no frontal sinus tenderness. Mouth/Throat: No oropharyngeal exudate or posterior oropharyngeal erythema. Cardiovascular: S1 normal and S2 normal. Exam reveals no gallop and no friction rub. No murmur heard. Pulmonary/Chest: Breath sounds normal. No respiratory distress. She has no wheezes. Lymphadenopathy:     She has no cervical adenopathy. Neurological: She is oriented to person, place, and time.        Results for orders placed or performed in visit on 03/20/19   AMB EXT CREATININE   Result Value Ref Range    Creatinine, External 0.46    AMB EXT URINE MICROALBUMIN   Result Value Ref Range    Urine Microalbumin, External <3.0

## 2020-06-19 ENCOUNTER — VIRTUAL VISIT (OUTPATIENT)
Dept: FAMILY MEDICINE CLINIC | Age: 30
End: 2020-06-19

## 2020-06-19 DIAGNOSIS — N30.00 ACUTE CYSTITIS WITHOUT HEMATURIA: Primary | ICD-10-CM

## 2020-06-19 RX ORDER — ELETRIPTAN HYDROBROMIDE 40 MG/1
40 TABLET, FILM COATED ORAL
COMMUNITY

## 2020-06-19 RX ORDER — NITROFURANTOIN 25; 75 MG/1; MG/1
100 CAPSULE ORAL 2 TIMES DAILY
Qty: 14 CAP | Refills: 0 | Status: SHIPPED | OUTPATIENT
Start: 2020-06-19 | End: 2020-07-28 | Stop reason: ALTCHOICE

## 2020-06-19 NOTE — PROGRESS NOTES
Coleman Schaeffer is a 34 y.o. female who was seen by synchronous (real-time) audio-video technology on 6/19/2020. Consent: Coleman Schaeffer, who was seen by synchronous (real-time) audio-video technology, and/or her healthcare decision maker, is aware that this patient-initiated, Telehealth encounter on 6/19/2020 is a billable service, with coverage as determined by her insurance carrier. She is aware that she may receive a bill and has provided verbal consent to proceed: Yes. Assessment & Plan:   Diagnoses and all orders for this visit:    1. Acute cystitis without hematuria  -     nitrofurantoin, macrocrystal-monohydrate, (MACROBID) 100 mg capsule; Take 1 Cap by mouth two (2) times a day. - Worsening, start nitrofurantoin today as directed, follow up in clinic if symptoms persist or worsen. I spent at least 5 minutes on this visit with this established patient. Subjective:   Coleman Schaeffer is a 34 y.o. female who was seen for Bladder Infection (Patient reports burn only when urinating. Patient reorts on-going x3 days. Patient denies any blood in urine, odor, back pain, abdomen pain, discharge. Patient denies any Urgent care visit since last seen by PCP.)    Urinary Tract Infection  Patient complains of frequency, urgency, burning with urination. Onset was 4 days ago, gradually worsening since that time. Patient complains of none. Patient denies back pain, fever and vaginal discharge. There is not any concern of sexual abuse. There is not a history of trauma to the genital area. Patient does not have a history of recurrent UTI. Patient does not have a history of pyelonephritis. Prior to Admission medications    Medication Sig Start Date End Date Taking? Authorizing Provider   eletriptan (Relpax) 40 mg tablet Take 40 mg by mouth once as needed.  may repeat in 2 hours if necessary   Yes Provider, Historical   nitrofurantoin, macrocrystal-monohydrate, (MACROBID) 100 mg capsule Take 1 Cap by mouth two (2) times a day. 6/19/20  Yes Combs, Marcello Lefort, NP   galcanezumab-gnlm (EMGALITY SYRINGE) 120 mg/mL syrg by SubCUTAneous route. Inject monthly   Yes Provider, Historical   potassium citrate (UROCIT-K5) 5 mEq (540 mg) TbER tablet Take 5 mEq by mouth daily. 10/12/18  Yes Provider, Historical   sodium bicarbonate 650 mg tablet TAKE 1 TABLET BY MOUTH AFTER MEALS AS DIRECTED 10/11/18  Yes Provider, Historical   fexofenadine (ALLEGRA) 180 mg tablet Take  by mouth. Yes Provider, Historical   phenylephrine HCl (SINEX REGULAR NA) Take  by mouth. Provider, Historical     Allergies   Allergen Reactions    Ibuprofen Cold [Pseudoephedrine-Ibuprofen] Other (comments)    Ibuprofen Cold [Pseudoephedrine-Ibuprofen] Unable to Obtain    Lodine [Etodolac] Other (comments)     Headache    Lodine [Etodolac] Other (comments)     Headache      Naprosyn [Naproxen] Other (comments)    Naproxen Unknown (comments)    Nsaids (Non-Steroidal Anti-Inflammatory Drug) Nausea and Vomiting    Topamax [Topiramate] Other (comments)     Abdominal pain, Lactic Acidosis    Topamax [Topiramate] Unknown (comments)       Patient Active Problem List   Diagnosis Code    Body mass index 25.0-25.9, adult Z68.25    Body mass index less than 19, adult Z68.1    Dizziness R42    Dyspnea R06.00    Gastroesophageal reflux disease K21.9    GERD (gastroesophageal reflux disease) K21.9       Review of Systems   Constitutional: Negative for chills, fever and malaise/fatigue. Eyes: Negative for blurred vision. Respiratory: Negative for cough and shortness of breath. Cardiovascular: Negative for chest pain, palpitations and leg swelling. Genitourinary: Positive for dysuria, frequency and urgency. Negative for flank pain and hematuria. Neurological: Negative for dizziness and headaches. Objective:   Vital Signs: (As obtained by patient/caregiver at home)  There were no vitals taken for this visit.      [INSTRUCTIONS:  \"[x]\" Indicates a positive item  \"[]\" Indicates a negative item  -- DELETE ALL ITEMS NOT EXAMINED]    Constitutional: [x] Appears well-developed and well-nourished [x] No apparent distress      [] Abnormal -     Mental status: [x] Alert and awake  [x] Oriented to person/place/time [x] Able to follow commands    [] Abnormal -     Eyes:   EOM    [x]  Normal    [] Abnormal -   Sclera  [x]  Normal    [] Abnormal -          Discharge [x]  None visible   [] Abnormal -     HENT: [x] Normocephalic, atraumatic  [] Abnormal -   [x] Mouth/Throat: Mucous membranes are moist    External Ears [x] Normal  [] Abnormal -    Neck: [x] No visualized mass [] Abnormal -     Pulmonary/Chest: [x] Respiratory effort normal   [x] No visualized signs of difficulty breathing or respiratory distress        [] Abnormal -      Musculoskeletal:   [x] Normal gait with no signs of ataxia         [x] Normal range of motion of neck        [] Abnormal -     Neurological:        [x] No Facial Asymmetry (Cranial nerve 7 motor function) (limited exam due to video visit)          [x] No gaze palsy        [] Abnormal -          Skin:        [x] No significant exanthematous lesions or discoloration noted on facial skin         [] Abnormal -            Psychiatric:       [x] Normal Affect [] Abnormal -        [x] No Hallucinations    Other pertinent observable physical exam findings:-        We discussed the expected course, resolution and complications of the diagnosis(es) in detail. Medication risks, benefits, costs, interactions, and alternatives were discussed as indicated. I advised her to contact the office if her condition worsens, changes or fails to improve as anticipated. She expressed understanding with the diagnosis(es) and plan. Yudy Melton is a 34 y.o. female who was evaluated by a video visit encounter for concerns as above. Patient identification was verified prior to start of the visit. A caregiver was present when appropriate.  Due to this being a TeleHealth encounter (During XXCHX-55 public health emergency), evaluation of the following organ systems was limited: Vitals/Constitutional/EENT/Resp/CV/GI//MS/Neuro/Skin/Heme-Lymph-Imm. Pursuant to the emergency declaration under the [facility], [facility] waiver authority and the [employer] Act, this Virtual  Visit was conducted, with patient's (and/or legal guardian's) consent, to reduce the patient's risk of exposure to COVID-19 and provide necessary medical care. Services were provided through a video synchronous discussion virtually to substitute for in-person clinic visit. Patient located at homes.  Provider at office      Precious Reed NP

## 2020-07-28 ENCOUNTER — VIRTUAL VISIT (OUTPATIENT)
Dept: FAMILY MEDICINE CLINIC | Age: 30
End: 2020-07-28

## 2020-07-28 DIAGNOSIS — Z88.9 ALLERGY TO DRUG: Primary | ICD-10-CM

## 2020-07-28 RX ORDER — ALBUTEROL SULFATE 90 UG/1
1 AEROSOL, METERED RESPIRATORY (INHALATION)
Qty: 1 INHALER | Refills: 3 | Status: SHIPPED | OUTPATIENT
Start: 2020-07-28

## 2020-07-28 RX ORDER — FAMOTIDINE 40 MG/1
40 TABLET, FILM COATED ORAL DAILY
Qty: 30 TAB | Refills: 0 | Status: SHIPPED | OUTPATIENT
Start: 2020-07-28 | End: 2021-01-11 | Stop reason: ALTCHOICE

## 2020-07-28 RX ORDER — PREDNISONE 20 MG/1
40 TABLET ORAL
Qty: 10 TAB | Refills: 0 | Status: SHIPPED | OUTPATIENT
Start: 2020-07-28 | End: 2020-08-02

## 2020-07-28 NOTE — PROGRESS NOTES
Identified pt with two pt identifiers(name and ). Reviewed record in preparation for visit and have obtained necessary documentation. Chief Complaint   Patient presents with    Other     Pt states on 20 pt recieved Migrane shot; symptoms occured w/dizziness, SOB, coughing         Health Maintenance Due   Topic    DTaP/Tdap/Td series (1 - Tdap)    PAP AKA CERVICAL CYTOLOGY        Coordination of Care Questionnaire:  :   1) Have you been to an emergency room, urgent care, or hospitalized since your last visit? If yes, where when, and reason for visit? no      2. Have seen or consulted any other health care provider since your last visit? If yes, where when, and reason for visit? Yes, Dr. Erin Humphreys: Neurologist         Patient is accompanied by self I have received verbal consent from [facility] to discuss any/all medical information while they are present in the room.

## 2020-07-28 NOTE — PROGRESS NOTES
Georgia Tucker  34 y.o. female  1990  [id number]    [facility]  Progress Note     Encounter Date: 7/28/2020    Georgia Tucker is a 34 y.o. female who was seen by synchronous (real-time) audio-video technology on 7/28/2020. She and/or her healthcare decision maker is aware that this patient-initiated Telehealth encounter is a billable service, with coverage as determined by her insurance carrier. She  is aware that she may receive a bill and has provided verbal consent to proceed:Yes    I was in the office while conducting this encounter. The patient was checked in/\"roomed\" by Binh Bird CMA via telephone in the office. The patient was at home during the encounter. This visit was completed virtually using Doxy. me  Assessment and Plan:     Encounter Diagnoses     ICD-10-CM ICD-9-CM   1. Allergy to drug  Z88.9 V14.9       1. Allergy to drug  Reviewed with patient regarding allergic reaction. She has notified Neurologist regarding reaction. - famotidine (PEPCID) 40 mg tablet; Take 1 Tab by mouth daily. Dispense: 30 Tab; Refill: 0  - predniSONE (DELTASONE) 20 mg tablet; Take 40 mg by mouth daily (with breakfast) for 5 days. Dispense: 10 Tab; Refill: 0  - albuterol (PROVENTIL HFA, VENTOLIN HFA, PROAIR HFA) 90 mcg/actuation inhaler; Take 1 Puff by inhalation every four (4) hours as needed for Wheezing. Please fill whichever albuterol 'brand' covered by pt's formulary  Dispense: 1 Inhaler; Refill: 3      We discussed the expected course, resolution and complications of the diagnosis(es) in detail. Medication risks, benefits, costs, interactions, and alternatives were discussed as indicated. I advised her to contact the office if her condition worsens, changes or fails to improve as anticipated. She expressed understanding with the diagnosis(es) and plan.      CPT Codes 54052-08721 for Established Patients may apply to this Telehealth Visit    Pursuant to the emergency declaration under the [facility], [facility]5 waiver authority and the [employer] and [employer] Act, this Virtual  Visit was conducted, with patient's consent, to reduce the patient's risk of exposure to COVID-19 and provide continuity of care for an established patient. Services were provided through a video synchronous discussion virtually to substitute for in-person clinic visit. Electronically Signed: Janelle Trivedi MD    Current Medications after this visit     Current Outpatient Medications   Medication Sig    famotidine (PEPCID) 40 mg tablet Take 1 Tab by mouth daily.  predniSONE (DELTASONE) 20 mg tablet Take 40 mg by mouth daily (with breakfast) for 5 days.  albuterol (PROVENTIL HFA, VENTOLIN HFA, PROAIR HFA) 90 mcg/actuation inhaler Take 1 Puff by inhalation every four (4) hours as needed for Wheezing. Please fill whichever albuterol 'brand' covered by pt's formulary    eletriptan (Relpax) 40 mg tablet Take 40 mg by mouth once as needed. may repeat in 2 hours if necessary    phenylephrine HCl (SINEX REGULAR NA) Take  by mouth.  potassium citrate (UROCIT-K5) 5 mEq (540 mg) TbER tablet Take 5 mEq by mouth daily.  sodium bicarbonate 650 mg tablet TAKE 1 TABLET BY MOUTH AFTER MEALS AS DIRECTED    fexofenadine (ALLEGRA) 180 mg tablet Take  by mouth. No current facility-administered medications for this visit.       Medications Discontinued During This Encounter   Medication Reason    nitrofurantoin, macrocrystal-monohydrate, (MACROBID) 100 mg capsule Therapy Completed    galcanezumab-gnlm (EMGALITY SYRINGE) 120 mg/mL syrg Not A Current Medication     ~~~~~~~~~~~~~~~~~~~~~~~~~~~~~~~~~~~~~~~~~~~~~~    Chief Complaint   Patient presents with    Other     Pt states on 07/04/20 pt recieved Migrane shot; symptoms occured w/dizziness, SOB, coughing        History of Present Illness   Dedra Valle is a 34 y.o. female who presents for:    Dizziness  Patient present with cc of dizziness. Patient reports that she started a new migraine injection, Ajovy, on 7/03/2020. HTe next day she reported dizziness, short ness of breath and cough. Cough is non productive. No known sick contacts. She has taken meclizine for dizziness. Review of Systems   Review of Systems   Constitutional: Negative for chills and fever. HENT: Negative for congestion, ear discharge and sore throat. Eyes: Negative for double vision, photophobia and discharge. Respiratory: Positive for shortness of breath and wheezing. Negative for cough and sputum production. Cardiovascular: Negative for chest pain, palpitations and leg swelling. Gastrointestinal: Negative for diarrhea, nausea and vomiting. Genitourinary: Negative for dysuria and urgency. Skin: Negative. Neurological: Positive for dizziness. Negative for tremors and headaches. Vitals/Objective:     Due to this being a TeleHealth evaluation, many elements of the physical examination are unable to be assessed. Physical Exam  Constitutional:       General: She is not in acute distress. Appearance: Normal appearance. She is normal weight. She is not ill-appearing, toxic-appearing or diaphoretic. HENT:      Head: Normocephalic and atraumatic. Right Ear: External ear normal.      Left Ear: External ear normal.      Mouth/Throat:      Mouth: Mucous membranes are moist.   Eyes:      General:         Right eye: No discharge. Left eye: No discharge. Extraocular Movements: Extraocular movements intact. Conjunctiva/sclera: Conjunctivae normal.   Neck:      Musculoskeletal: Normal range of motion. Pulmonary:      Effort: Pulmonary effort is normal.      Comments: No visualized signs of difficulty breathing or respiratory distress  Skin:     Coloration: Skin is not pale. Findings: No erythema or rash.    Neurological:      Mental Status: She is alert and oriented to person, place, and time. Cranial Nerves: No cranial nerve deficit ( No Facial Asymmetry (Cranial nerve 7 motor function) (limited exam due to video visit) ). Comments: Able to follow commands    Psychiatric:         Mood and Affect: Mood normal.         Behavior: Behavior normal.          No results found for this or any previous visit (from the past 24 hour(s)). Disposition     No future appointments. History   Patient's past medical, surgical and family histories were reviewed and updated.     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;    NSAID induced gastritis     Peptic ulcer     Peptic ulcer disease      Past Surgical History:   Procedure Laterality Date    HX CHOLECYSTECTOMY      HX FEMUR FRACTURE TX       Family History   Problem Relation Age of Onset    Hypertension Mother      Social History     Tobacco Use    Smoking status: Never Smoker    Smokeless tobacco: Never Used   Substance Use Topics    Alcohol use: No    Drug use: No       Allergies     Allergies   Allergen Reactions    Ajovy Autoinjector [Fremanezumab-Vfrm] Shortness of Breath, Cough and Vertigo    Ibuprofen Cold [Pseudoephedrine-Ibuprofen] Other (comments)    Ibuprofen Cold [Pseudoephedrine-Ibuprofen] Unable to Obtain    Lodine [Etodolac] Other (comments)     Headache    Lodine [Etodolac] Other (comments)     Headache      Naprosyn [Naproxen] Other (comments)    Naproxen Unknown (comments)    Nsaids (Non-Steroidal Anti-Inflammatory Drug) Nausea and Vomiting    Topamax [Topiramate] Other (comments)     Abdominal pain, Lactic Acidosis    Topamax [Topiramate] Unknown (comments)

## 2020-12-08 LAB — SARS-COV-2, NAA: NEGATIVE

## 2020-12-11 LAB — SARS-COV-2, NAA: NEGATIVE

## 2021-01-07 ENCOUNTER — TELEPHONE (OUTPATIENT)
Dept: FAMILY MEDICINE CLINIC | Age: 31
End: 2021-01-07

## 2021-01-07 NOTE — TELEPHONE ENCOUNTER
Please advise       ----- Message from Ramos Luna sent at 1/7/2021  9:55 AM EST -----  Regarding: Dr. Tiago Trivedi first and last name: Bernie Reeves, At home care  Reason for call: Sign off on home health orders  Callback required yes/no and why: yes  Best contact number(s): [phone]  Details to clarify the request: N/A

## 2021-01-11 ENCOUNTER — VIRTUAL VISIT (OUTPATIENT)
Dept: FAMILY MEDICINE CLINIC | Age: 31
End: 2021-01-11
Payer: MEDICAID

## 2021-01-11 DIAGNOSIS — E87.20 ACIDOSIS: Primary | ICD-10-CM

## 2021-01-11 DIAGNOSIS — R60.9 EDEMA, UNSPECIFIED TYPE: ICD-10-CM

## 2021-01-11 DIAGNOSIS — I10 HYPERTENSION, UNSPECIFIED TYPE: ICD-10-CM

## 2021-01-11 PROCEDURE — 99214 OFFICE O/P EST MOD 30 MIN: CPT | Performed by: FAMILY MEDICINE

## 2021-01-11 RX ORDER — PROMETHAZINE HYDROCHLORIDE 12.5 MG/1
12.5 TABLET ORAL
COMMUNITY
End: 2021-02-08

## 2021-01-11 RX ORDER — ONDANSETRON 4 MG/1
4 TABLET, FILM COATED ORAL
COMMUNITY

## 2021-01-11 RX ORDER — MIRTAZAPINE 7.5 MG/1
7.5 TABLET, FILM COATED ORAL
COMMUNITY
End: 2021-02-08 | Stop reason: ALTCHOICE

## 2021-01-11 RX ORDER — FOLIC ACID 1 MG/1
1 TABLET ORAL 2 TIMES DAILY
COMMUNITY
End: 2021-01-28 | Stop reason: SDUPTHER

## 2021-01-11 RX ORDER — CARVEDILOL 25 MG/1
25 TABLET ORAL 2 TIMES DAILY WITH MEALS
COMMUNITY
End: 2021-01-28

## 2021-01-11 RX ORDER — METOCLOPRAMIDE 5 MG/1
5 TABLET ORAL
COMMUNITY
End: 2021-02-08 | Stop reason: ALTCHOICE

## 2021-01-11 RX ORDER — DICYCLOMINE HYDROCHLORIDE 20 MG/1
20 TABLET ORAL
COMMUNITY
End: 2021-02-08 | Stop reason: SDUPTHER

## 2021-01-11 RX ORDER — PANTOPRAZOLE SODIUM 40 MG/1
40 TABLET, DELAYED RELEASE ORAL 2 TIMES DAILY
COMMUNITY
End: 2021-02-08 | Stop reason: SDUPTHER

## 2021-01-11 NOTE — PROGRESS NOTES
Yossi Howard  27 y.o. female  1990  [id number]    [facility]  Progress Note     Encounter Date: 1/11/2021    Yossi Howard is a 27 y.o. female who was seen by synchronous (real-time) audio-video technology on 1/11/2021. She and/or her healthcare decision maker is aware that this patient-initiated Telehealth encounter is a billable service, with coverage as determined by her insurance carrier. She  is aware that she may receive a bill and has provided verbal consent to proceed:Yes    I was at home while conducting this encounter. The patient was at home during the encounter accompanied by mother. This visit was completed virtually using Doxy. me  Assessment and Plan:     Encounter Diagnoses     ICD-10-CM ICD-9-CM   1. Acidosis  E87.2 276.2   2. Hypertension, unspecified type  I10 401.9   3. Edema, unspecified type  R60.9 782.3       1. Acidosis  2. Hypertension, unspecified type  3. Edema, unspecified type  No records available for review at time of the encounter. Agreed to home health orders for patient. Medications updated based on patient's report. - REFERRAL TO HOME HEALTH      We discussed the expected course, resolution and complications of the diagnosis(es) in detail. Medication risks, benefits, costs, interactions, and alternatives were discussed as indicated. I advised her to contact the office if her condition worsens, changes or fails to improve as anticipated. She expressed understanding with the diagnosis(es) and plan.      CPT Codes 18433-92931 for Established Patients may apply to this Telehealth Visit    Pursuant to the emergency declaration under the [facility] [place], [address] waiver authority and the [employer] Act, this Virtual  Visit was conducted, with patient's consent, to reduce the patient's risk of exposure to COVID-19 and provide continuity of care for an established patient. Services were provided through a video synchronous discussion virtually to substitute for in-person clinic visit. Electronically Signed: Mosie Snellen, MD    Current Medications after this visit     Current Outpatient Medications   Medication Sig    metoclopramide HCl (REGLAN) 5 mg tablet Take 5 mg by mouth Before breakfast, lunch, dinner and at bedtime.  mirtazapine (REMERON) 7.5 mg tablet Take 7.5 mg by mouth nightly.  folic acid (FOLVITE) 1 mg tablet Take 1 mg by mouth two (2) times a day.  pantoprazole (PROTONIX) 40 mg tablet Take 40 mg by mouth two (2) times a day.  carvediloL (Coreg) 25 mg tablet Take 25 mg by mouth two (2) times daily (with meals).  ondansetron hcl (Zofran) 4 mg tablet Take 4 mg by mouth every eight (8) hours as needed for Nausea or Vomiting.  promethazine (PHENERGAN) 12.5 mg tablet Take 12.5 mg by mouth every six (6) hours as needed for Nausea.  dicyclomine (BENTYL) 20 mg tablet Take 20 mg by mouth every six (6) hours as needed for Abdominal Cramps.  albuterol (PROVENTIL HFA, VENTOLIN HFA, PROAIR HFA) 90 mcg/actuation inhaler Take 1 Puff by inhalation every four (4) hours as needed for Wheezing. Please fill whichever albuterol 'brand' covered by pt's formulary    eletriptan (Relpax) 40 mg tablet Take 40 mg by mouth once as needed. may repeat in 2 hours if necessary    potassium citrate (UROCIT-K5) 5 mEq (540 mg) TbER tablet Take 5 mEq by mouth daily.  sodium bicarbonate 650 mg tablet TAKE 1 TABLET BY MOUTH AFTER MEALS AS DIRECTED    fexofenadine (ALLEGRA) 180 mg tablet Take  by mouth. No current facility-administered medications for this visit.       Medications Discontinued During This Encounter   Medication Reason    famotidine (PEPCID) 40 mg tablet DISCONTINUED BY ANOTHER CLINICIAN    phenylephrine HCl (SINEX REGULAR NA) Not A Current Medication     ~~~~~~~~~~~~~~~~~~~~~~~~~~~~~~~~~~~~~~~~~~~~~~    Chief Complaint Patient presents with   [facility] Follow Up       History of Present Illness   Susanne Ramos is a 27 y.o. female who presents for:    Hospital Follow  Patient present with [facility] hospital follow up. Patient had been admitted to [address] [place] [address] after GI procedure. SHe was admitted for 4 weeks and released on 1/09/2021. She is being seen by At [address] [place] [address] Aman De Guy; she has an intake visit today. Mother reports that she was admitted to the ICU for renal tubular acidosis. Review of Systems   Review of Systems   Constitutional: Negative for chills and fever. HENT: Negative for congestion, ear discharge and sore throat. Eyes: Negative for double vision, photophobia and discharge. Respiratory: Negative for cough, sputum production, shortness of breath and wheezing. Cardiovascular: Negative for chest pain, palpitations and leg swelling. Gastrointestinal: Negative for diarrhea, nausea and vomiting. Genitourinary: Negative for dysuria and urgency. Skin: Negative. Neurological: Negative for dizziness, tremors and headaches. Vitals/Objective:     Due to this being a TeleHealth evaluation, many elements of the physical examination are unable to be assessed. Physical Exam  Constitutional:       General: She is not in acute distress. Appearance: Normal appearance. She is obese. She is not ill-appearing. HENT:      Head: Normocephalic and atraumatic. Right Ear: External ear normal.      Left Ear: External ear normal.      Mouth/Throat:      Mouth: Mucous membranes are moist.   Eyes:      General:         Right eye: No discharge. Left eye: No discharge. Extraocular Movements: Extraocular movements intact. Conjunctiva/sclera: Conjunctivae normal.   Neck:      Musculoskeletal: Normal range of motion. Pulmonary:      Effort: Pulmonary effort is normal.      Comments: No visualized signs of difficulty breathing or respiratory distress  Skin:     Coloration: Skin is not pale. Findings: No erythema or rash. Neurological:      Mental Status: She is alert and oriented to person, place, and time. Cranial Nerves: No cranial nerve deficit ( No Facial Asymmetry (Cranial nerve 7 motor function) (limited exam due to video visit) ). Comments: Able to follow commands    Psychiatric:         Mood and Affect: Mood normal.         Behavior: Behavior normal.         No results found for this or any previous visit (from the past 24 hour(s)). Disposition     No future appointments. History   Patient's past medical, surgical and family histories were reviewed and updated.     Past Medical History:   Diagnosis Date    [facility] [address]     Cerebrovascular accident (CVA) (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; right side of brain; 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;    NSAID induced gastritis     Peptic ulcer     Peptic ulcer disease      Past Surgical History:   Procedure Laterality Date    HX CHOLECYSTECTOMY      HX FEMUR FRACTURE TX       Family History   Problem Relation Age of Onset    Hypertension Mother      Social History     Tobacco Use    Smoking status: Never Smoker    Smokeless tobacco: Never Used   Substance Use Topics    Alcohol use: No    Drug use: No       Allergies     Allergies   Allergen Reactions    Ajovy Autoinjector [Fremanezumab-Vfrm] Shortness of Breath, Cough and Vertigo    Ibuprofen Cold [Pseudoephedrine-Ibuprofen] Other (comments)    Lodine [Etodolac] Other (comments)     Headache      Naprosyn [Naproxen] Other (comments)    Nsaids (Non-Steroidal Anti-Inflammatory Drug) Nausea and Vomiting    Topamax [Topiramate] Other (comments)     Abdominal pain, Lactic Acidosis

## 2021-01-15 ENCOUNTER — TELEPHONE (OUTPATIENT)
Dept: FAMILY MEDICINE CLINIC | Age: 31
End: 2021-01-15

## 2021-01-15 NOTE — TELEPHONE ENCOUNTER
Neither of those medication is listed on the patient's profile and both medications are controlled substance. She will need an appointment to discuss.

## 2021-01-15 NOTE — TELEPHONE ENCOUNTER
Please advise  
 
 
----- Message from Edgar Elena sent at 1/15/2021 11:08 AM EST ----- Regarding: /Refill Contact: [phone] Medication Refill Caller (if not patient): Jennifer Ortega Relationship of caller (if not patient): Mother Best contact number(s): [phone] Name of medication and dosage if known: \" Percocet\" 5-325 mg \"Ativan\" 0.25 mg \" Is patient out of this medication (yes/no): Yes Pharmacy name: [facility] 
 
Pharmacy listed in chart? (yes/no): Yes Pharmacy phone number: [phone] Details to clarify the request: Pharmacy was never able to fill Ativan. Edgar Elena

## 2021-01-18 ENCOUNTER — VIRTUAL VISIT (OUTPATIENT)
Dept: FAMILY MEDICINE CLINIC | Age: 31
End: 2021-01-18
Payer: MEDICAID

## 2021-01-18 DIAGNOSIS — R52 PAIN: Primary | ICD-10-CM

## 2021-01-18 DIAGNOSIS — M62.838 MUSCLE SPASM: ICD-10-CM

## 2021-01-18 PROCEDURE — 99214 OFFICE O/P EST MOD 30 MIN: CPT | Performed by: FAMILY MEDICINE

## 2021-01-18 RX ORDER — OXYCODONE AND ACETAMINOPHEN 5; 325 MG/1; MG/1
.5-1 TABLET ORAL
Qty: 10 TAB | Refills: 0 | Status: SHIPPED | OUTPATIENT
Start: 2021-01-18 | End: 2021-02-08 | Stop reason: SDUPTHER

## 2021-01-18 RX ORDER — CYCLOBENZAPRINE HCL 5 MG
5 TABLET ORAL
Qty: 60 TAB | Refills: 1 | Status: SHIPPED | OUTPATIENT
Start: 2021-01-18 | End: 2021-03-02 | Stop reason: SDUPTHER

## 2021-01-18 NOTE — PROGRESS NOTES
Ponce Santos  27 y.o. female  1990  RXS:[id number]    [facility]  Progress Note     Encounter Date: 1/18/2021    Ponce Santos is a 27 y.o. female who was seen by synchronous (real-time) audio-video technology on 1/18/2021. She and/or her healthcare decision maker is aware that this patient-initiated Telehealth encounter is a billable service, with coverage as determined by her insurance carrier. She  is aware that she may receive a bill and has provided verbal consent to proceed:Yes    I was at home while conducting this encounter. The patient was checked in/\"roomed\" by Bon Townsend CMA via telephone in the office. The patient was at home during the encounter. This visit was completed virtually using Doxy. me  Assessment and Plan:     Encounter Diagnoses     ICD-10-CM ICD-9-CM   1. Pain  R52 780.96   2. Muscle spasm  M62.838 728.85       1. Pain  2. Muscle spasm  Advised patient and her mother that perocet prescription is a one-time only prescription for this issue. Recommended trial of muscle relaxant post-therapy sessions prior to trying pain medication. - oxyCODONE-acetaminophen (PERCOCET) 5-325 mg per tablet; Take 0.5-1 Tabs by mouth every four (4) hours as needed for Pain for up to 3 days. Max Daily Amount: 6 Tabs. Dispense: 10 Tab; Refill: 0  - cyclobenzaprine (FLEXERIL) 5 mg tablet; Take 1 Tab by mouth two (2) times daily as needed for Muscle Spasm(s). Dispense: 60 Tab; Refill: 1      We discussed the expected course, resolution and complications of the diagnosis(es) in detail. Medication risks, benefits, costs, interactions, and alternatives were discussed as indicated. I advised her to contact the office if her condition worsens, changes or fails to improve as anticipated. She expressed understanding with the diagnosis(es) and plan.      CPT Codes 41422-76437 for Established Patients may apply to this Telehealth Visit    Pursuant to the emergency declaration under the [facility], [facility] waiver authority and the [employer] Act, this Virtual  Visit was conducted, with patient's consent, to reduce the patient's risk of exposure to COVID-19 and provide continuity of care for an established patient. Services were provided through a video synchronous discussion virtually to substitute for in-person clinic visit. Electronically Signed: Rodolfo Delgado MD    Current Medications after this visit     Current Outpatient Medications   Medication Sig    oxyCODONE-acetaminophen (PERCOCET) 5-325 mg per tablet Take 0.5-1 Tabs by mouth every four (4) hours as needed for Pain for up to 3 days. Max Daily Amount: 6 Tabs.  cyclobenzaprine (FLEXERIL) 5 mg tablet Take 1 Tab by mouth two (2) times daily as needed for Muscle Spasm(s).  metoclopramide HCl (REGLAN) 5 mg tablet Take 5 mg by mouth Before breakfast, lunch, dinner and at bedtime.  mirtazapine (REMERON) 7.5 mg tablet Take 7.5 mg by mouth nightly.  folic acid (FOLVITE) 1 mg tablet Take 1 mg by mouth two (2) times a day.  pantoprazole (PROTONIX) 40 mg tablet Take 40 mg by mouth two (2) times a day.  ondansetron hcl (Zofran) 4 mg tablet Take 4 mg by mouth every eight (8) hours as needed for Nausea or Vomiting.  promethazine (PHENERGAN) 12.5 mg tablet Take 12.5 mg by mouth every six (6) hours as needed for Nausea.  dicyclomine (BENTYL) 20 mg tablet Take 20 mg by mouth every six (6) hours as needed for Abdominal Cramps.  albuterol (PROVENTIL HFA, VENTOLIN HFA, PROAIR HFA) 90 mcg/actuation inhaler Take 1 Puff by inhalation every four (4) hours as needed for Wheezing. Please fill whichever albuterol 'brand' covered by pt's formulary    eletriptan (Relpax) 40 mg tablet Take 40 mg by mouth once as needed.  may repeat in 2 hours if necessary    potassium citrate (UROCIT-K5) 5 mEq (540 mg) TbER tablet Take 5 mEq by mouth daily.  sodium bicarbonate 650 mg tablet TAKE 1 TABLET BY MOUTH AFTER MEALS AS DIRECTED    fexofenadine (ALLEGRA) 180 mg tablet Take  by mouth.  carvediloL (Coreg) 25 mg tablet Take 25 mg by mouth two (2) times daily (with meals). No current facility-administered medications for this visit. There are no discontinued medications. ~~~~~~~~~~~~~~~~~~~~~~~~~~~~~~~~~~~~~~~~~~~~~~    Chief Complaint   Patient presents with    Medication Refill     f/u       History of Present Illness   [place] is a 27 y.o. female who presents for:    Pain  Patient present with cc of pain. Patient reports that she is feeling pain throughout her body. Describes pain as soreness. Mother reports that she is in pain from stomach, extremities ; worse after physical therapy. Physical therapy is twice a week and OT twice a week. Mother reports that she take tylenol and half oxycodone-APAP after physical therapy. She had been prescribed pain by Dr. Germaine Bergeron #10.   - Patient is unable to NSAIDs due to ulcer disease. Review of Systems   Review of Systems   Constitutional: Positive for malaise/fatigue. Negative for chills and weight loss. Musculoskeletal: Positive for myalgias. Negative for joint pain. Neurological: Negative for tremors, sensory change, seizures and weakness. Vitals/Objective:     Due to this being a TeleHealth evaluation, many elements of the physical examination are unable to be assessed. Physical Exam  Constitutional:       General: She is not in acute distress. Appearance: Normal appearance. She is obese. She is not ill-appearing. HENT:      Head: Normocephalic and atraumatic. Right Ear: External ear normal.      Left Ear: External ear normal.      Mouth/Throat:      Mouth: Mucous membranes are moist.   Eyes:      General:         Right eye: No discharge. Left eye: No discharge. Extraocular Movements: Extraocular movements intact. Conjunctiva/sclera: Conjunctivae normal.      Comments: +left eye strabimus   Neck:      Musculoskeletal: Normal range of motion. Pulmonary:      Effort: Pulmonary effort is normal.      Comments: No visualized signs of difficulty breathing or respiratory distress  Skin:     Coloration: Skin is not pale. Findings: No erythema or rash. Neurological:      Mental Status: She is alert and oriented to person, place, and time. Cranial Nerves: No cranial nerve deficit ( No Facial Asymmetry (Cranial nerve 7 motor function) (limited exam due to video visit) ). Comments: Able to follow commands    Psychiatric:         Mood and Affect: Mood normal.         Behavior: Behavior normal.          No results found for this or any previous visit (from the past 24 hour(s)). Disposition     No future appointments. History   Patient's past medical, surgical and family histories were reviewed and updated.     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;    NSAID induced gastritis     Peptic ulcer     Peptic ulcer disease      Past Surgical History:   Procedure Laterality Date    HX CHOLECYSTECTOMY      HX FEMUR FRACTURE [place]       Family History   Problem Relation Age of Onset    Hypertension Mother      Social History     Tobacco Use    Smoking status: Never Smoker    Smokeless tobacco: Never Used   Substance Use Topics    Alcohol use: No    Drug use: No       Allergies     Allergies   Allergen Reactions    Ajovy Autoinjector [Fremanezumab-Vfrm] Shortness of Breath, Cough and Vertigo    Ibuprofen Cold [Pseudoephedrine-Ibuprofen] Other (comments)    Lodine [Etodolac] Other (comments)     Headache      Naprosyn [Naproxen] Other (comments)    Nsaids (Non-Steroidal Anti-Inflammatory Drug) Nausea and Vomiting    Topamax [Topiramate] Other (comments)     Abdominal pain, Lactic Acidosis

## 2021-01-18 NOTE — PROGRESS NOTES
Identified pt with two pt identifiers(name and ). Reviewed record in preparation for visit and have obtained necessary documentation. Chief Complaint   Patient presents with    Medication Refill     f/u        Health Maintenance Due   Topic    DTaP/Tdap/Td series (1 - Tdap)    PAP AKA CERVICAL CYTOLOGY     Flu Vaccine (1)       Coordination of Care Questionnaire:  :   1) Have you been to an emergency room, urgent care, or hospitalized since your last visit? If yes, where when, and reason for visit? Yes, [place] for Dehydration/nausea       2. Have seen or consulted any other health care provider since your last visit? If yes, where when, and reason for visit?  no        Patient is accompanied by self, mother I have received verbal consent from Susanne Ramos to discuss any/all medical information while they are present in the room.

## 2021-01-19 ENCOUNTER — DOCUMENTATION ONLY (OUTPATIENT)
Dept: FAMILY MEDICINE CLINIC | Age: 31
End: 2021-01-19

## 2021-01-19 NOTE — PROGRESS NOTES
At [facility] completed and faxed by 1/19/2021 to   (f): [phone]    Confirmation: OK

## 2021-01-21 ENCOUNTER — VIRTUAL VISIT (OUTPATIENT)
Dept: FAMILY MEDICINE CLINIC | Age: 31
End: 2021-01-21
Payer: COMMERCIAL

## 2021-01-21 DIAGNOSIS — I10 HYPERTENSION, UNSPECIFIED TYPE: Primary | ICD-10-CM

## 2021-01-21 PROCEDURE — 99213 OFFICE O/P EST LOW 20 MIN: CPT | Performed by: FAMILY MEDICINE

## 2021-01-21 RX ORDER — ERENUMAB-AOOE 140 MG/ML
INJECTION, SOLUTION SUBCUTANEOUS
COMMUNITY
Start: 2020-12-30

## 2021-01-21 RX ORDER — LORAZEPAM 0.5 MG/1
TABLET ORAL
COMMUNITY
Start: 2021-01-16 | End: 2021-02-08

## 2021-01-21 RX ORDER — AMLODIPINE BESYLATE 2.5 MG/1
2.5 TABLET ORAL DAILY
Qty: 90 TAB | Refills: 0 | Status: SHIPPED | OUTPATIENT
Start: 2021-01-21 | End: 2021-02-08

## 2021-01-21 NOTE — PROGRESS NOTES
Identified pt with two pt identifiers(name and ). Reviewed record in preparation for visit and have obtained necessary documentation. Chief Complaint   Patient presents with    Blood Pressure Check     For x5 days; high bp reading         Health Maintenance Due   Topic    DTaP/Tdap/Td series (1 - Tdap)    PAP AKA CERVICAL CYTOLOGY     Flu Vaccine (1)       Coordination of Care Questionnaire:  :   1) Have you been to an emergency room, urgent care, or hospitalized since your last visit? If yes, where when, and reason for visit? Yes, [place] for nausea/vomiting, UTI, sepsis       2. Have seen or consulted any other health care provider since your last visit? If yes, where when, and reason for visit?  no        Patient is accompanied by self I have received verbal consent from Keli Mcclendon to discuss any/all medical information while they are present in the room.

## 2021-01-21 NOTE — PROGRESS NOTES
Jono Fenton  27 y.o. female  1990  FLR:[id number]    [facility]  Progress Note     Encounter Date: 1/21/2021    Jono Fenton is a 27 y.o. female who was seen by synchronous (real-time) audio-video technology on 1/21/2021. She and/or her healthcare decision maker is aware that this patient-initiated Telehealth encounter is a billable service, with coverage as determined by her insurance carrier. She  is aware that she may receive a bill and has provided verbal consent to proceed:Yes    I was at home while conducting this encounter. The patient was checked in/\"roomed\" by Rome Dyer CMA via telephone in the office. The patient was at home during the encounter. This visit was completed virtually using Doxy. me  Assessment and Plan:     Encounter Diagnoses     ICD-10-CM ICD-9-CM   1. Hypertension, unspecified type  I10 401.9       1. Hypertension, unspecified type  Given increase in BP, start low-dose of amlodipine only with very close monitoring in case of hypotension.   - amLODIPine (NORVASC) 2.5 mg tablet; Take 1 Tab by mouth daily. Dispense: 90 Tab; Refill: 0      We discussed the expected course, resolution and complications of the diagnosis(es) in detail. Medication risks, benefits, costs, interactions, and alternatives were discussed as indicated. I advised her to contact the office if her condition worsens, changes or fails to improve as anticipated. She expressed understanding with the diagnosis(es) and plan. CPT Codes 39426-80550 for Established Patients may apply to this Telehealth Visit    Pursuant to the emergency declaration under the [facility], [facility] waiver authority and the [employer] Act, this Virtual  Visit was conducted, with patient's consent, to reduce the patient's risk of exposure to COVID-19 and provide continuity of care for an established patient. Services were provided through a video synchronous discussion virtually to substitute for in-person clinic visit. Electronically Signed: Diego Warren MD    Current Medications after this visit     Current Outpatient Medications   Medication Sig    LORazepam (ATIVAN) 0.5 mg tablet TAKE 1/2 TABLET BY MOUTH 3 TIMES A DAY AS NEEDED FOR ANXIETY    Aimovig Autoinjector 140 mg/mL injection INJECT 1 SYRINGE ONCE MONTHLY    amLODIPine (NORVASC) 2.5 mg tablet Take 1 Tab by mouth daily.  oxyCODONE-acetaminophen (PERCOCET) 5-325 mg per tablet Take 0.5-1 Tabs by mouth every four (4) hours as needed for Pain for up to 3 days. Max Daily Amount: 6 Tabs.  cyclobenzaprine (FLEXERIL) 5 mg tablet Take 1 Tab by mouth two (2) times daily as needed for Muscle Spasm(s).  metoclopramide HCl (REGLAN) 5 mg tablet Take 5 mg by mouth Before breakfast, lunch, dinner and at bedtime.  mirtazapine (REMERON) 7.5 mg tablet Take 7.5 mg by mouth nightly.  folic acid (FOLVITE) 1 mg tablet Take 1 mg by mouth two (2) times a day.  pantoprazole (PROTONIX) 40 mg tablet Take 40 mg by mouth two (2) times a day.  ondansetron hcl (Zofran) 4 mg tablet Take 4 mg by mouth every eight (8) hours as needed for Nausea or Vomiting.  promethazine (PHENERGAN) 12.5 mg tablet Take 12.5 mg by mouth every six (6) hours as needed for Nausea.  dicyclomine (BENTYL) 20 mg tablet Take 20 mg by mouth every six (6) hours as needed for Abdominal Cramps.  albuterol (PROVENTIL HFA, VENTOLIN HFA, PROAIR HFA) 90 mcg/actuation inhaler Take 1 Puff by inhalation every four (4) hours as needed for Wheezing. Please fill whichever albuterol 'brand' covered by pt's formulary    eletriptan (Relpax) 40 mg tablet Take 40 mg by mouth once as needed. may repeat in 2 hours if necessary    potassium citrate (UROCIT-K5) 5 mEq (540 mg) TbER tablet Take 5 mEq by mouth daily.     sodium bicarbonate 650 mg tablet TAKE 1 TABLET BY MOUTH AFTER MEALS AS DIRECTED  fexofenadine (ALLEGRA) 180 mg tablet Take  by mouth.  carvediloL (Coreg) 25 mg tablet Take 25 mg by mouth two (2) times daily (with meals). No current facility-administered medications for this visit. There are no discontinued medications. ~~~~~~~~~~~~~~~~~~~~~~~~~~~~~~~~~~~~~~~~~~~~~~    Chief Complaint   Patient presents with    Blood Pressure Check     For x5 days; high bp reading        History of Present Illness   Vaughn Mcnulty is a 27 y.o. female who presents for:    BP  Patient present with cc of BP. Patient and mother reports that her blood pressure. Diastolic blood pressure has been in 90-100s. She had been on coreg and amlodipine while in the hospital until she had several episodes of hypotension 70s/50s. Review of Systems   Review of Systems   Constitutional: Negative for chills and fever. HENT: Negative for congestion, ear discharge and sore throat. Eyes: Negative for double vision, photophobia and discharge. Respiratory: Negative for cough, sputum production, shortness of breath and wheezing. Cardiovascular: Negative for chest pain, palpitations and leg swelling. Gastrointestinal: Negative for diarrhea, nausea and vomiting. Genitourinary: Negative for dysuria and urgency. Skin: Negative. Neurological: Negative for dizziness, tremors and headaches. Vitals/Objective:     Due to this being a TeleHealth evaluation, many elements of the physical examination are unable to be assessed. Physical Exam  Constitutional:       General: She is not in acute distress. Appearance: Normal appearance. She is obese. She is not ill-appearing. HENT:      Head: Normocephalic and atraumatic. Right Ear: External ear normal.      Left Ear: External ear normal.      Mouth/Throat:      Mouth: Mucous membranes are moist.   Eyes:      General:         Right eye: No discharge. Left eye: No discharge. Extraocular Movements: Extraocular movements intact. Conjunctiva/sclera: Conjunctivae normal.      Comments: +left eye strabimus   Neck:      Musculoskeletal: Normal range of motion. Pulmonary:      Effort: Pulmonary effort is normal.      Comments: No visualized signs of difficulty breathing or respiratory distress  Skin:     Coloration: Skin is not pale. Findings: No erythema or rash. Neurological:      Mental Status: She is alert and oriented to person, place, and time. Cranial Nerves: No cranial nerve deficit ( No Facial Asymmetry (Cranial nerve 7 motor function) (limited exam due to video visit) ). Comments: Able to follow commands    Psychiatric:         Mood and Affect: Mood normal.         Behavior: Behavior normal.          No results found for this or any previous visit (from the past 24 hour(s)). Disposition     No future appointments. History   Patient's past medical, surgical and family histories were reviewed and updated.     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; right side of brain; 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;    NSAID induced gastritis     Peptic ulcer     Peptic ulcer disease      Past Surgical History:   Procedure Laterality Date    HX CHOLECYSTECTOMY      HX FEMUR FRACTURE TX       Family History   Problem Relation Age of Onset    Hypertension Mother      Social History     Tobacco Use    Smoking status: Never Smoker    Smokeless tobacco: Never Used   Substance Use Topics    Alcohol use: No    Drug use: No       Allergies     Allergies   Allergen Reactions    Ajovy Autoinjector [Fremanezumab-Vfrm] Shortness of Breath, Cough and Vertigo    Ibuprofen Cold [Pseudoephedrine-Ibuprofen] Other (comments)    Lodine [Etodolac] Other (comments)     Headache      Naprosyn [Naproxen] Other (comments)    Nsaids (Non-Steroidal Anti-Inflammatory Drug) Nausea and Vomiting    Topamax [Topiramate] Other (comments)     Abdominal pain, Lactic Acidosis

## 2021-01-22 ENCOUNTER — TELEPHONE (OUTPATIENT)
Dept: FAMILY MEDICINE CLINIC | Age: 31
End: 2021-01-22

## 2021-01-22 DIAGNOSIS — D64.9 ANEMIA, UNSPECIFIED TYPE: Primary | ICD-10-CM

## 2021-01-22 DIAGNOSIS — R00.0 TACHYCARDIA: ICD-10-CM

## 2021-01-22 LAB — SARS-COV-2, NAA: NEGATIVE

## 2021-01-22 NOTE — TELEPHONE ENCOUNTER
Labs ordered. Though if patient is stilll tachycardic and feeling unwell, she should go to urgent care/ER or call [facility] ([phone]).

## 2021-01-22 NOTE — TELEPHONE ENCOUNTER
TC  From  Mrs. Rock Sykes  [facility] needs to go to urgent care or ER but she really do not want to go. Per DR Gupta- patient needs to go to the ER to be evaluated     Per Mrs. Rock Sykes, she will monitor her daughter  symptoms  And if things get worst she will go to the ER

## 2021-01-22 NOTE — TELEPHONE ENCOUNTER
TC- to Mrs. Lily Andino ( mom )      she was told order will be fax order to Home health, but if symptoms get worst call dispatch health or go to the ER     Verbally understood      lab order fax [phone]

## 2021-01-22 NOTE — TELEPHONE ENCOUNTER
TC from [facility] from - At [address]     Today Mr. Jackson pulse was 128 , kind of pale looking. Had some     chest pain yesterday for about 30 min, has some body aches      symptoms today. They would like a order for a CBC due to a hx of     low Hgb, also would like to have a CMP drawn to [facility] electrolytes as     well.      Would like order for labs fax to [phone]       Please advise

## 2021-01-28 ENCOUNTER — VIRTUAL VISIT (OUTPATIENT)
Dept: FAMILY MEDICINE CLINIC | Age: 31
End: 2021-01-28
Payer: MEDICAID

## 2021-01-28 DIAGNOSIS — I10 HYPERTENSION, UNSPECIFIED TYPE: ICD-10-CM

## 2021-01-28 DIAGNOSIS — R00.0 TACHYCARDIA: Primary | ICD-10-CM

## 2021-01-28 DIAGNOSIS — D64.9 ANEMIA, UNSPECIFIED TYPE: ICD-10-CM

## 2021-01-28 PROCEDURE — 99213 OFFICE O/P EST LOW 20 MIN: CPT | Performed by: FAMILY MEDICINE

## 2021-01-28 RX ORDER — FOLIC ACID 1 MG/1
1 TABLET ORAL 2 TIMES DAILY
Qty: 180 TAB | Refills: 1 | Status: SHIPPED | OUTPATIENT
Start: 2021-01-28 | End: 2021-09-02 | Stop reason: SDUPTHER

## 2021-01-28 RX ORDER — CARVEDILOL 25 MG/1
12.5 TABLET ORAL 2 TIMES DAILY WITH MEALS
Qty: 90 TAB | Refills: 0
Start: 2021-01-28 | End: 2021-02-04

## 2021-01-28 NOTE — PROGRESS NOTES
South [place]uca  27 y.o. female  1990  [id number]:[id number]    [facility] FAMILY MEDICINE  Progress Note     Encounter Date: 1/28/2021    [place] is a 27 y.o. female who was seen by synchronous (real-time) audio-video technology on 1/28/2021. She and/or her healthcare decision maker is aware that this patient-initiated Telehealth encounter is a billable service, with coverage as determined by her insurance carrier. She  is aware that she may receive a bill and has provided verbal consent to proceed:Yes    I was in the office while conducting this encounter. The patient was at home during the encounter accompanied by her mother. This visit was completed virtually using [email or web address] me  Assessment and Plan:     Encounter Diagnoses     ICD-10-CM ICD-9-CM   1. Tachycardia  R00.0 785.0   2. Hypertension, unspecified type  I10 401.9   3. Anemia, unspecified type  D64.9 285.9       1. Tachycardia  2. Hypertension, unspecified type  Resume coreg at lower dose. Continue close monitoring of BP and heart rate. - carvediloL (Coreg) 25 mg tablet; Take 0.5 Tabs by mouth two (2) times daily (with meals). Dispense: 90 Tab; Refill: 0    3. Anemia, unspecified type  - folic acid (FOLVITE) 1 mg tablet; Take 1 Tab by mouth two (2) times a day. Dispense: 180 Tab; Refill: 1      We discussed the expected course, resolution and complications of the diagnosis(es) in detail. Medication risks, benefits, costs, interactions, and alternatives were discussed as indicated. I advised her to contact the office if her condition worsens, changes or fails to improve as anticipated. She expressed understanding with the diagnosis(es) and plan.      CPT Codes 57604-45575 for Established Patients may apply to this Telehealth Visit    Pursuant to the emergency declaration under the [address] [place] [address] authority and the [employer] Act, this Virtual  Visit was conducted, with patient's consent, to reduce the patient's risk of exposure to COVID-19 and provide continuity of care for an established patient. Services were provided through a video synchronous discussion virtually to substitute for in-person clinic visit. Electronically Signed: Primo Cano MD    Current Medications after this visit     Current Outpatient Medications   Medication Sig    folic acid (FOLVITE) 1 mg tablet Take 1 Tab by mouth two (2) times a day.  carvediloL (Coreg) 25 mg tablet Take 0.5 Tabs by mouth two (2) times daily (with meals).  Aimovig Autoinjector 140 mg/mL injection INJECT 1 SYRINGE ONCE MONTHLY    amLODIPine (NORVASC) 2.5 mg tablet Take 1 Tab by mouth daily.  cyclobenzaprine (FLEXERIL) 5 mg tablet Take 1 Tab by mouth two (2) times daily as needed for Muscle Spasm(s).  mirtazapine (REMERON) 7.5 mg tablet Take 7.5 mg by mouth nightly.  pantoprazole (PROTONIX) 40 mg tablet Take 40 mg by mouth two (2) times a day.  promethazine (PHENERGAN) 12.5 mg tablet Take 12.5 mg by mouth every six (6) hours as needed for Nausea.  dicyclomine (BENTYL) 20 mg tablet Take 20 mg by mouth every six (6) hours as needed for Abdominal Cramps.  albuterol (PROVENTIL HFA, VENTOLIN HFA, PROAIR HFA) 90 mcg/actuation inhaler Take 1 Puff by inhalation every four (4) hours as needed for Wheezing. Please fill whichever albuterol 'brand' covered by pt's formulary    eletriptan (Relpax) 40 mg tablet Take 40 mg by mouth once as needed. may repeat in 2 hours if necessary    sodium bicarbonate 650 mg tablet TAKE 1 TABLET BY MOUTH AFTER MEALS AS DIRECTED    LORazepam (ATIVAN) 0.5 mg tablet TAKE 1/2 TABLET BY MOUTH 3 TIMES A DAY AS NEEDED FOR ANXIETY    metoclopramide HCl (REGLAN) 5 mg tablet Take 5 mg by mouth Before breakfast, lunch, dinner and at bedtime. Currently taking only once daily; she is weaning off of the medication.     ondansetron hcl (Zofran) 4 mg tablet Take 4 mg by mouth every eight (8) hours as needed for Nausea or Vomiting.   • potassium citrate (UROCIT-K5) 5 mEq (540 mg) TbER tablet Take 5 mEq by mouth daily.   • fexofenadine (ALLEGRA) 180 mg tablet Take  by mouth.     No current facility-administered medications for this visit.      Medications Discontinued During This Encounter   Medication Reason   • folic acid (FOLVITE) 1 mg tablet REORDER   • carvediloL (Coreg) 25 mg tablet      ~~~~~~~~~~~~~~~~~~~~~~~~~~~~~~~~~~~~~~~~~~~~~~    Chief Complaint   Patient presents with   • Hypertension   • Hospital Follow Up     [facility] ER       History of Present Illness   Stephie Jackson is a 30 y.o. female who presents for:    HTN/tahcycardiac and ER follow up.   Patient present with cc of HTN and tachycardia. Patient had gone to ER after HH noted HR>120 and feeling HA. Blood work showed no abnormalities including normal troponin and ddimer in the ER. She was advised to follow up with cardiology.   - Today she notes that her BP and HR are still elevated. She has a headache but no other symptoms.   Patient had been on coreg 25 mg but she had several episodes of hypotension.   Review of Systems   Review of Systems   Constitutional: Negative for chills, fever and weight loss.   Cardiovascular: Negative for chest pain and palpitations.   Neurological: Positive for headaches. Negative for dizziness, tingling and tremors.        Vitals/Objective:     Due to this being a TeleHealth evaluation, many elements of the physical examination are unable to be assessed.     Physical Exam  Constitutional:       General: She is not in acute distress.     Appearance: Normal appearance. She is obese. She is not ill-appearing.   HENT:      Head: Normocephalic and atraumatic.      Right Ear: External ear normal.      Left Ear: External ear normal.      Mouth/Throat:      Mouth: Mucous membranes are moist.   Eyes:      General:         Right eye: No discharge.         Left eye: No  discharge. Extraocular Movements: Extraocular movements intact. Conjunctiva/sclera: Conjunctivae normal.      Comments: +left eye strabimus   Neck:      Musculoskeletal: Normal range of motion. Pulmonary:      Effort: Pulmonary effort is normal.      Comments: No visualized signs of difficulty breathing or respiratory distress  Skin:     Coloration: Skin is not pale. Findings: No erythema or rash. Neurological:      Mental Status: She is alert and oriented to person, place, and time. Cranial Nerves: No cranial nerve deficit ( No Facial Asymmetry (Cranial nerve 7 motor function) (limited exam due to video visit) ). Comments: Able to follow commands    Psychiatric:         Mood and Affect: Mood normal.         Behavior: Behavior normal.          No results found for this or any previous visit (from the past 24 hour(s)). Disposition     No future appointments. History   Patient's past medical, surgical and family histories were reviewed and updated.     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;    NSAID induced gastritis     Peptic ulcer     Peptic ulcer disease      Past Surgical History:   Procedure Laterality Date    HX CHOLECYSTECTOMY      HX FEMUR FRACTURE [place]       Family History   Problem Relation Age of Onset    Hypertension Mother      Social History     Tobacco Use    Smoking status: Never Smoker    Smokeless tobacco: Never Used   Substance Use Topics    Alcohol use: No    Drug use: No       Allergies     Allergies   Allergen Reactions    Ajovy Autoinjector [Fremanezumab-Vfrm] Shortness of Breath, Cough and Vertigo    Ibuprofen Cold [Pseudoephedrine-Ibuprofen] Other (comments)    Lodine [Etodolac] Other (comments)     Headache  Naprosyn [Naproxen] Other (comments)    Nsaids (Non-Steroidal Anti-Inflammatory Drug) Nausea and Vomiting    Topamax [Topiramate] Other (comments)     Abdominal pain, Lactic Acidosis

## 2021-02-02 ENCOUNTER — TELEPHONE (OUTPATIENT)
Dept: FAMILY MEDICINE CLINIC | Age: 31
End: 2021-02-02

## 2021-02-02 NOTE — TELEPHONE ENCOUNTER
----- Message from Alix Rao sent at 2/2/2021  1:20 PM EST -----  Regarding: Dr. Elisabeth Veloz telephone  General Message/Vendor Calls    Caller's first and last name: Tj Rowley, home health nurse      Reason for call: Caller would like to get an order for at least 6 more nursing visits to monitor the patient's blood pressure until it becomes stable with new blood pressure medication.      Callback required yes/no and why: yes, verification      Best contact number(s): [phone]      Details to clarify the request: N/A      Alix Rao

## 2021-02-04 ENCOUNTER — TELEPHONE (OUTPATIENT)
Dept: FAMILY MEDICINE CLINIC | Age: 31
End: 2021-02-04

## 2021-02-04 DIAGNOSIS — R00.0 TACHYCARDIA: ICD-10-CM

## 2021-02-04 DIAGNOSIS — I10 HYPERTENSION, UNSPECIFIED TYPE: ICD-10-CM

## 2021-02-04 RX ORDER — CARVEDILOL 25 MG/1
TABLET ORAL
Qty: 90 TAB | Refills: 0
Start: 2021-02-04 | End: 2021-03-02 | Stop reason: SDUPTHER

## 2021-02-04 NOTE — TELEPHONE ENCOUNTER
Please advise       ----- Message from Mckenzie Paget sent at 2/4/2021 10:39 AM EST -----  Regarding: Dr. Honorio Pandey: [phone]  General Message/Vendor Calls    Caller's first and last name: Cesar Graham - Mother      Reason for call: Request to adjust BP medication      Callback required yes/no and why: yes/discuss options      Best contact number(s): [phone]      Details to clarify the request:  N/A      Mckenzie Orellanat

## 2021-02-04 NOTE — TELEPHONE ENCOUNTER
Mother reports that patients systolic BP has been running in 120s-130s and HR in 100s though down from 120s. Mother is concerned with increasing to full 25 mg BID as patient has bottomed out previously.  Agreed to increase dose to 25 mg in AM and 12.5 mg in PM.

## 2021-02-08 ENCOUNTER — VIRTUAL VISIT (OUTPATIENT)
Dept: FAMILY MEDICINE CLINIC | Age: 31
End: 2021-02-08
Payer: MEDICAID

## 2021-02-08 DIAGNOSIS — K21.9 GASTROESOPHAGEAL REFLUX DISEASE, UNSPECIFIED WHETHER ESOPHAGITIS PRESENT: Primary | ICD-10-CM

## 2021-02-08 DIAGNOSIS — R52 PAIN: ICD-10-CM

## 2021-02-08 DIAGNOSIS — R10.9 ABDOMINAL CRAMPS: ICD-10-CM

## 2021-02-08 DIAGNOSIS — M62.838 MUSCLE SPASM: ICD-10-CM

## 2021-02-08 PROCEDURE — 99214 OFFICE O/P EST MOD 30 MIN: CPT | Performed by: FAMILY MEDICINE

## 2021-02-08 RX ORDER — PANTOPRAZOLE SODIUM 40 MG/1
40 TABLET, DELAYED RELEASE ORAL DAILY
Qty: 180 TAB | Refills: 0 | Status: SHIPPED | OUTPATIENT
Start: 2021-02-08 | End: 2021-09-20 | Stop reason: SDUPTHER

## 2021-02-08 RX ORDER — DICYCLOMINE HYDROCHLORIDE 20 MG/1
20 TABLET ORAL
Qty: 180 TAB | Refills: 0 | Status: SHIPPED | OUTPATIENT
Start: 2021-02-08 | End: 2022-01-18

## 2021-02-08 RX ORDER — OXYCODONE AND ACETAMINOPHEN 5; 325 MG/1; MG/1
.5-1 TABLET ORAL
Qty: 10 TAB | Refills: 0 | Status: SHIPPED | OUTPATIENT
Start: 2021-02-08 | End: 2021-02-11

## 2021-02-08 NOTE — PROGRESS NOTES
Chana Sprague  27 y.o. female  1990  [id number]    [facility] FAMILY MEDICINE  Progress Note     Encounter Date: 2/8/2021    Chana Sprague is a 27 y.o. female who was seen by synchronous (real-time) audio-video technology on 2/8/2021. She and/or her healthcare decision maker is aware that this patient-initiated Telehealth encounter is a billable service, with coverage as determined by her insurance carrier. She  is aware that she may receive a bill and has provided verbal consent to proceed:Yes    I was at home while conducting this encounter. The patient was at home during the encounter and accompanied by mother. This visit was completed virtually using Doxy. me  Assessment and Plan:     Encounter Diagnoses     ICD-10-CM ICD-9-CM   1. Gastroesophageal reflux disease, unspecified whether esophagitis present  K21.9 530.81   2. Abdominal cramps  R10.9 789.00   3. Pain  R52 780.96   4. Muscle spasm  M62.838 728.85       1. Gastroesophageal reflux disease, unspecified whether esophagitis present  2. Abdominal cramps  Patient's abdominal cramps are improving.   - dicyclomine (BENTYL) 20 mg tablet; Take 1 Tab by mouth every six (6) hours as needed for Abdominal Cramps. Dispense: 180 Tab; Refill: 0  - pantoprazole (PROTONIX) 40 mg tablet; Take 1 Tab by mouth daily. Dispense: 180 Tab; Refill: 0    3. Pain  4. Muscle spasm  advised patient and mother that I would not be prescribing both an opiate pain medication and lorazepam. Patient advised that she must come into office for any further refills of medication. - oxyCODONE-acetaminophen (PERCOCET) 5-325 mg per tablet; Take 0.5-1 Tabs by mouth every four (4) hours as needed for Pain for up to 3 days. Max Daily Amount: 6 Tabs. Dispense: 10 Tab; Refill: 0      We discussed the expected course, resolution and complications of the diagnosis(es) in detail.   Medication risks, benefits, costs, interactions, and alternatives were discussed as indicated. I advised her to contact the office if her condition worsens, changes or fails to improve as anticipated. She expressed understanding with the diagnosis(es) and plan. CPT Codes 87931-22807 for Established Patients may apply to this Telehealth Visit    Pursuant to the emergency declaration under the [facility] [place] [address] [facility] authority and the [employer] Act, this Virtual  Visit was conducted, with patient's consent, to reduce the patient's risk of exposure to COVID-19 and provide continuity of care for an established patient. Services were provided through a video synchronous discussion virtually to substitute for in-person clinic visit. Electronically Signed: Rodolfo Delgado MD    Current Medications after this visit     Current Outpatient Medications   Medication Sig    dicyclomine (BENTYL) 20 mg tablet Take 1 Tab by mouth every six (6) hours as needed for Abdominal Cramps.  pantoprazole (PROTONIX) 40 mg tablet Take 1 Tab by mouth daily.  oxyCODONE-acetaminophen (PERCOCET) 5-325 mg per tablet Take 0.5-1 Tabs by mouth every four (4) hours as needed for Pain for up to 3 days. Max Daily Amount: 6 Tabs.  carvediloL (Coreg) 25 mg tablet Take 1 Tab by mouth every morning AND 0.5 Tabs every evening.  folic acid (FOLVITE) 1 mg tablet Take 1 Tab by mouth two (2) times a day.  Aimovig Autoinjector 140 mg/mL injection INJECT 1 SYRINGE ONCE MONTHLY    cyclobenzaprine (FLEXERIL) 5 mg tablet Take 1 Tab by mouth two (2) times daily as needed for Muscle Spasm(s).  ondansetron hcl (Zofran) 4 mg tablet Take 4 mg by mouth every eight (8) hours as needed for Nausea or Vomiting.  albuterol (PROVENTIL HFA, VENTOLIN HFA, PROAIR HFA) 90 mcg/actuation inhaler Take 1 Puff by inhalation every four (4) hours as needed for Wheezing.  Please fill whichever albuterol 'brand' covered by pt's formulary    eletriptan (Relpax) 40 mg tablet Take 40 mg by mouth once as needed. may repeat in 2 hours if necessary    potassium citrate (UROCIT-K5) 5 mEq (540 mg) TbER tablet Take 5 mEq by mouth daily.  sodium bicarbonate 650 mg tablet TAKE 1 TABLET BY MOUTH AFTER MEALS AS DIRECTED    fexofenadine (ALLEGRA) 180 mg tablet Take  by mouth. No current facility-administered medications for this visit. Medications Discontinued During This Encounter   Medication Reason    amLODIPine (NORVASC) 2.5 mg tablet Not A Current Medication    metoclopramide HCl (REGLAN) 5 mg tablet Therapy Completed    mirtazapine (REMERON) 7.5 mg tablet Therapy Completed    promethazine (PHENERGAN) 12.5 mg tablet Not A Current Medication    LORazepam (ATIVAN) 0.5 mg tablet Not A Current Medication    pantoprazole (PROTONIX) 40 mg tablet REORDER    dicyclomine (BENTYL) 20 mg tablet REORDER    oxyCODONE-acetaminophen (PERCOCET) 5-325 mg per tablet REORDER     ~~~~~~~~~~~~~~~~~~~~~~~~~~~~~~~~~~~~~~~~~~~~~~    Chief Complaint   Patient presents with    Medication Refill       History of Present Illness   Kassie Vargas is a 27 y.o. female who presents for:    Muscle pain   Patient present with cc of pain. Patient reports that she is feeling pain throughout her body. Describes pain as soreness. Mother reports that she is in pain from stomach, extremities ; worse after physical therapy. Physical therapy is twice a week and OT twice a week. Mother reports that she take tylenol and half oxycodone-APAP after physical therapy. She has been started on muscle relaxer after PT with minimal response. Patient reports  Review of Systems   Review of Systems   Constitutional: Negative for chills, fever and weight loss. Respiratory: Negative for cough, sputum production and shortness of breath. Musculoskeletal: Positive for myalgias. Negative for joint pain and neck pain. Neurological: Positive for weakness.  Negative for speech change and focal weakness. Vitals/Objective:     Due to this being a TeleHealth evaluation, many elements of the physical examination are unable to be assessed. Physical Exam  Constitutional:       General: She is not in acute distress. Appearance: Normal appearance. She is obese. She is not ill-appearing. HENT:      Head: Normocephalic and atraumatic. Right Ear: External ear normal.      Left Ear: External ear normal.      Mouth/Throat:      Mouth: Mucous membranes are moist.   Eyes:      General:         Right eye: No discharge. Left eye: No discharge. Extraocular Movements: Extraocular movements intact. Conjunctiva/sclera: Conjunctivae normal.      Comments: +left eye strabimus   Neck:      Musculoskeletal: Normal range of motion. Pulmonary:      Effort: Pulmonary effort is normal.      Comments: No visualized signs of difficulty breathing or respiratory distress  Skin:     Coloration: Skin is not pale. Findings: No erythema or rash. Neurological:      Mental Status: She is alert and oriented to person, place, and time. Cranial Nerves: No cranial nerve deficit ( No Facial Asymmetry (Cranial nerve 7 motor function) (limited exam due to video visit) ). Comments: Able to follow commands    Psychiatric:         Mood and Affect: Mood normal.         Behavior: Behavior normal.          No results found for this or any previous visit (from the past 24 hour(s)). Disposition     No future appointments. History   Patient's past medical, surgical and family histories were reviewed and updated.     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;    NSAID induced gastritis     Peptic ulcer     Peptic ulcer disease      Past Surgical History: Procedure Laterality Date    HX CHOLECYSTECTOMY      HX FEMUR FRACTURE TX       Family History   Problem Relation Age of Onset    Hypertension Mother      Social History     Tobacco Use    Smoking status: Never Smoker    Smokeless tobacco: Never Used   Substance Use Topics    Alcohol use: No    Drug use: No       Allergies     Allergies   Allergen Reactions    Ajovy Autoinjector [Fremanezumab-Vfrm] Shortness of Breath, Cough and Vertigo    Ibuprofen Cold [Pseudoephedrine-Ibuprofen] Other (comments)    Lodine [Etodolac] Other (comments)     Headache      Naprosyn [Naproxen] Other (comments)    Nsaids (Non-Steroidal Anti-Inflammatory Drug) Nausea and Vomiting    Topamax [Topiramate] Other (comments)     Abdominal pain, Lactic Acidosis

## 2021-02-10 ENCOUNTER — DOCUMENTATION ONLY (OUTPATIENT)
Dept: FAMILY MEDICINE CLINIC | Age: 31
End: 2021-02-10

## 2021-02-10 NOTE — PROGRESS NOTES
PA for Pantoprazole Sodium 40 mg tablet Forms completed and faxed by 02/10/2021 to   [employer]- (f): [phone]    Confirmation: OK

## 2021-02-17 ENCOUNTER — DOCUMENTATION ONLY (OUTPATIENT)
Dept: FAMILY MEDICINE CLINIC | Age: 31
End: 2021-02-17

## 2021-02-17 NOTE — PROGRESS NOTES
[employer] in [facility], [place] completed and faxed by 02/17/2021 to   (f): [phone]    Confirmation: OK

## 2021-03-02 ENCOUNTER — VIRTUAL VISIT (OUTPATIENT)
Dept: FAMILY MEDICINE CLINIC | Age: 31
End: 2021-03-02
Payer: COMMERCIAL

## 2021-03-02 DIAGNOSIS — R52 PAIN: ICD-10-CM

## 2021-03-02 DIAGNOSIS — R00.0 TACHYCARDIA: ICD-10-CM

## 2021-03-02 DIAGNOSIS — M62.838 MUSCLE SPASM: ICD-10-CM

## 2021-03-02 DIAGNOSIS — I10 ESSENTIAL HYPERTENSION: ICD-10-CM

## 2021-03-02 PROCEDURE — 99213 OFFICE O/P EST LOW 20 MIN: CPT | Performed by: FAMILY MEDICINE

## 2021-03-02 RX ORDER — CYCLOBENZAPRINE HCL 5 MG
5 TABLET ORAL
Qty: 90 TAB | Refills: 2 | Status: SHIPPED | OUTPATIENT
Start: 2021-03-02 | End: 2021-09-22 | Stop reason: SDUPTHER

## 2021-03-02 RX ORDER — CARVEDILOL 25 MG/1
TABLET ORAL
Qty: 135 TAB | Refills: 1 | Status: SHIPPED | OUTPATIENT
Start: 2021-03-02 | End: 2021-09-02 | Stop reason: SDUPTHER

## 2021-03-04 ENCOUNTER — DOCUMENTATION ONLY (OUTPATIENT)
Dept: FAMILY MEDICINE CLINIC | Age: 31
End: 2021-03-04

## 2021-03-08 ENCOUNTER — DOCUMENTATION ONLY (OUTPATIENT)
Dept: FAMILY MEDICINE CLINIC | Age: 31
End: 2021-03-08

## 2021-03-08 NOTE — PROGRESS NOTES
At [address] [facility] completed and faxed by 03/05/2021 to   (f): [phone]    Confirmation: OK

## 2021-03-11 ENCOUNTER — TELEPHONE (OUTPATIENT)
Dept: FAMILY MEDICINE CLINIC | Age: 31
End: 2021-03-11

## 2021-03-25 ENCOUNTER — TELEPHONE (OUTPATIENT)
Dept: FAMILY MEDICINE CLINIC | Age: 31
End: 2021-03-25

## 2021-03-25 NOTE — TELEPHONE ENCOUNTER
Please advise       ----- Message from Karol Stewart sent at 3/25/2021  1:16 PM EDT -----  Regarding: Former Dr. Douglas Arellano first and last name: Marcelina Dwyer from [address] [place] [address]      Reason for call: Called to check the status of a fax sent on 3/15 and 3/22. Callback required yes/no and why: yes, to confirm       Best contact number(s): [phone]      Details to clarify the request: [address] [place] [address] had faxed an order for a splint and requesting office notes for patient's last two visits. On the order physician needs to ling through Dr. Darrin Mak name and NPI and add the current physician's name and NPI.        Karol Stewart

## 2021-03-25 NOTE — TELEPHONE ENCOUNTER
Please fax the last 2 visit notes as requested above. I do not have the order, I will sign once I receive it.   Thanks

## 2021-04-24 ENCOUNTER — IMMUNIZATION (OUTPATIENT)
Dept: INTERNAL MEDICINE CLINIC | Age: 31
End: 2021-04-24
Payer: COMMERCIAL

## 2021-04-24 DIAGNOSIS — Z23 ENCOUNTER FOR IMMUNIZATION: Primary | ICD-10-CM

## 2021-04-24 PROCEDURE — 0001A COVID-19, MRNA, LNP-S, PF, 30MCG/0.3ML DOSE(PFIZER): CPT | Performed by: FAMILY MEDICINE

## 2021-04-24 PROCEDURE — 91300 COVID-19, MRNA, LNP-S, PF, 30MCG/0.3ML DOSE(PFIZER): CPT | Performed by: FAMILY MEDICINE

## 2021-05-19 ENCOUNTER — TELEPHONE (OUTPATIENT)
Dept: FAMILY MEDICINE CLINIC | Age: 31
End: 2021-05-19

## 2021-05-19 NOTE — TELEPHONE ENCOUNTER
Signed forms, certificate of medical necessity received from [facility]. Placed in out box to be faxed.

## 2021-09-02 ENCOUNTER — VIRTUAL VISIT (OUTPATIENT)
Dept: FAMILY MEDICINE CLINIC | Age: 31
End: 2021-09-02
Payer: MEDICAID

## 2021-09-02 DIAGNOSIS — R00.0 TACHYCARDIA: ICD-10-CM

## 2021-09-02 DIAGNOSIS — D64.9 ANEMIA, UNSPECIFIED TYPE: ICD-10-CM

## 2021-09-02 DIAGNOSIS — I10 ESSENTIAL HYPERTENSION: Primary | ICD-10-CM

## 2021-09-02 PROCEDURE — 99213 OFFICE O/P EST LOW 20 MIN: CPT | Performed by: NURSE PRACTITIONER

## 2021-09-02 RX ORDER — FOLIC ACID 1 MG/1
1 TABLET ORAL 2 TIMES DAILY
Qty: 180 TABLET | Refills: 1 | Status: SHIPPED | OUTPATIENT
Start: 2021-09-02 | End: 2022-10-21

## 2021-09-02 RX ORDER — CARVEDILOL 25 MG/1
TABLET ORAL
Qty: 135 TABLET | Refills: 1 | Status: SHIPPED | OUTPATIENT
Start: 2021-09-02 | End: 2022-02-22 | Stop reason: ALTCHOICE

## 2021-09-02 NOTE — PROGRESS NOTES
Jesenia Knight is a 27 y.o. female who was seen by synchronous (real-time) audio-video technology on 9/2/2021 for No chief complaint on file. Assessment & Plan:   Diagnoses and all orders for this visit:    1. Essential hypertension  -     carvediloL (Coreg) 25 mg tablet; Take 1 Tablet by mouth every morning AND 0.5 Tablets every evening.  - Stable based on home readings, refill today, follow up in 6 months. Labs done by nephrology    2. Tachycardia  -     carvediloL (Coreg) 25 mg tablet; Take 1 Tablet by mouth every morning AND 0.5 Tablets every evening.  - Stable, based on home readings    3. Anemia, unspecified type  -     folic acid (FOLVITE) 1 mg tablet; Take 1 Tablet by mouth two (2) times a day. - Presumed stable, refill today        I spent at least 7 minutes on this visit with this established patient. Subjective:   VV today for medication refills. HTN  Takes BP at home and gets 100/72, 75, 117/82, 85, 121/87, 86. Takes medications as prescribed with no reported side effects. Denies CP, SOB, palpitations or leg swelling. Sees Dr. Nirali Weiss. Does labs, he has not sent them to us after chart review. Patient states she will remind the office to send us results. Prior to Admission medications    Medication Sig Start Date End Date Taking? Authorizing Provider   carvediloL (Coreg) 25 mg tablet Take 1 Tablet by mouth every morning AND 0.5 Tablets every evening. 9/2/21  Yes Mabel Hoskins NP   folic acid (FOLVITE) 1 mg tablet Take 1 Tablet by mouth two (2) times a day. 9/2/21  Yes Mabel Hoskins NP   cyclobenzaprine (FLEXERIL) 5 mg tablet Take 1 Tab by mouth three (3) times daily as needed for Muscle Spasm(s). 3/2/21  Yes Mat Gupta MD   dicyclomine (BENTYL) 20 mg tablet Take 1 Tab by mouth every six (6) hours as needed for Abdominal Cramps. 2/8/21  Yes Mat Gupta MD   pantoprazole (PROTONIX) 40 mg tablet Take 1 Tab by mouth daily.  2/8/21  Yes Mat Gupta MD   Aimovig Autoinjector 140 mg/mL injection INJECT 1 SYRINGE ONCE MONTHLY 12/30/20  Yes Provider, Historical   ondansetron hcl (Zofran) 4 mg tablet Take 4 mg by mouth every eight (8) hours as needed for Nausea or Vomiting. Yes Provider, Historical   albuterol (PROVENTIL HFA, VENTOLIN HFA, PROAIR HFA) 90 mcg/actuation inhaler Take 1 Puff by inhalation every four (4) hours as needed for Wheezing. Please fill whichever albuterol 'brand' covered by pt's formulary 7/28/20  Yes Miles Garces MD   eletriptan (Relpax) 40 mg tablet Take 40 mg by mouth once as needed. may repeat in 2 hours if necessary   Yes Provider, Historical   potassium citrate (UROCIT-K5) 5 mEq (540 mg) TbER tablet Take 5 mEq by mouth daily. 10/12/18  Yes Provider, Historical   sodium bicarbonate 650 mg tablet TAKE 1 TABLET BY MOUTH AFTER MEALS AS DIRECTED 10/11/18  Yes Provider, Historical   fexofenadine (ALLEGRA) 180 mg tablet Take  by mouth. Yes Provider, Historical     Patient Active Problem List   Diagnosis Code    Body mass index 25.0-25.9, adult Z68.25    Dizziness R42    Dyspnea R06.00    GERD (gastroesophageal reflux disease) K21.9       Review of Systems   Constitutional: Negative for chills, fever and malaise/fatigue. Eyes: Negative for blurred vision. Respiratory: Negative for cough and shortness of breath. Cardiovascular: Negative for chest pain, palpitations and leg swelling. Neurological: Negative for dizziness and headaches.        Objective:     Patient-Reported Vitals 3/2/2021   Patient-Reported Weight -   Patient-Reported Height -   Patient-Reported Pulse -   Patient-Reported Systolic  965   Patient-Reported Diastolic 86        [INSTRUCTIONS:  \"[x]\" Indicates a positive item  \"[]\" Indicates a negative item  -- DELETE ALL ITEMS NOT EXAMINED]    Constitutional: [x] Appears well-developed and well-nourished [x] No apparent distress      [] Abnormal -     Mental status: [x] Alert and awake  [x] Oriented to person/place/time [x] Able to follow commands    [] Abnormal -     Eyes:   EOM    [x]  Normal    [] Abnormal -   Sclera  [x]  Normal    [] Abnormal -          Discharge [x]  None visible   [] Abnormal -     HENT: [x] Normocephalic, atraumatic  [] Abnormal -   [x] Mouth/Throat: Mucous membranes are moist    External Ears [x] Normal  [] Abnormal -    Neck: [x] No visualized mass [] Abnormal -     Pulmonary/Chest: [x] Respiratory effort normal   [x] No visualized signs of difficulty breathing or respiratory distress        [] Abnormal -      Musculoskeletal:   [x] Normal gait with no signs of ataxia         [x] Normal range of motion of neck        [] Abnormal -     Neurological:        [x] No Facial Asymmetry (Cranial nerve 7 motor function) (limited exam due to video visit)          [x] No gaze palsy        [] Abnormal -          Skin:        [x] No significant exanthematous lesions or discoloration noted on facial skin         [] Abnormal -            Psychiatric:       [x] Normal Affect [] Abnormal -        [x] No Hallucinations    Other pertinent observable physical exam findings:-        We discussed the expected course, resolution and complications of the diagnosis(es) in detail. Medication risks, benefits, costs, interactions, and alternatives were discussed as indicated. I advised her to contact the office if her condition worsens, changes or fails to improve as anticipated. She expressed understanding with the diagnosis(es) and plan. Chio Arrieta, was evaluated through a synchronous (real-time) audio-video encounter. The patient (or guardian if applicable) is aware that this is a billable service. Verbal consent to proceed has been obtained within the past 12 months. The visit was conducted pursuant to the emergency declaration under the [facility], [address] [place] [address] authority and the [employer] Act.   Patient identification was verified, and a caregiver was present when appropriate. The patient was located in a state where the provider was credentialed to provide care.       Renato Gates NP

## 2021-09-20 DIAGNOSIS — R10.9 ABDOMINAL CRAMPS: ICD-10-CM

## 2021-09-20 DIAGNOSIS — K21.9 GASTROESOPHAGEAL REFLUX DISEASE, UNSPECIFIED WHETHER ESOPHAGITIS PRESENT: ICD-10-CM

## 2021-09-20 RX ORDER — PANTOPRAZOLE SODIUM 40 MG/1
40 TABLET, DELAYED RELEASE ORAL DAILY
Qty: 180 TABLET | Refills: 0 | Status: SHIPPED | OUTPATIENT
Start: 2021-09-20 | End: 2022-04-15

## 2021-09-22 DIAGNOSIS — M62.838 MUSCLE SPASM: ICD-10-CM

## 2021-09-22 DIAGNOSIS — R52 PAIN: ICD-10-CM

## 2021-09-24 RX ORDER — CYCLOBENZAPRINE HCL 5 MG
5 TABLET ORAL
Qty: 90 TABLET | Refills: 2 | Status: SHIPPED | OUTPATIENT
Start: 2021-09-24 | End: 2022-03-04 | Stop reason: SDUPTHER

## 2022-01-18 DIAGNOSIS — R10.9 ABDOMINAL CRAMPS: ICD-10-CM

## 2022-01-18 DIAGNOSIS — K21.9 GASTROESOPHAGEAL REFLUX DISEASE, UNSPECIFIED WHETHER ESOPHAGITIS PRESENT: ICD-10-CM

## 2022-01-18 RX ORDER — DICYCLOMINE HYDROCHLORIDE 20 MG/1
20 TABLET ORAL
Qty: 180 TABLET | Refills: 0 | Status: SHIPPED | OUTPATIENT
Start: 2022-01-18

## 2022-01-18 NOTE — TELEPHONE ENCOUNTER
PCP: Katina English MD    Last appt: 9/2/2021  No future appointments. Requested Prescriptions     Pending Prescriptions Disp Refills    dicyclomine (BENTYL) 20 mg tablet [Pharmacy Med Name: DICYCLOMINE 20 MG TABLET] 180 Tablet 0     Sig: TAKE 1 TAB BY MOUTH EVERY SIX (6) HOURS AS NEEDED FOR ABDOMINAL CRAMPS.        Prior labs and Blood pressures:  BP Readings from Last 3 Encounters:   09/27/19 121/88   10/16/18 110/78   09/06/18 108/74     No results found for: NA, K, CL, CO2, AGAP, GLU, BUN, CREA, BUCR, GFRAA, GFRNA, CA, GFRAA  No results found for: HBA1C, VRB5YMCL, TNX3IHCL  No results found for: CHOL, CHOLPOCT, CHOLX, CHLST, CHOLV, HDL, HDLPOC, HDLP, LDL, LDLCPOC, LDLC, DLDLP, VLDLC, VLDL, TGLX, TRIGL, TRIGP, TGLPOCT, CHHD, CHHDX  No results found for: VITD3, XQVID2, XQVID3, XQVID, VD3RIA    No results found for: TSH, TSH2, TSH3, TSHP, TSHEXT

## 2022-02-11 ENCOUNTER — TELEPHONE (OUTPATIENT)
Dept: FAMILY MEDICINE CLINIC | Age: 32
End: 2022-02-11

## 2022-02-11 NOTE — TELEPHONE ENCOUNTER
----- Message from Gris Covarrubias sent at 2/11/2022 10:54 AM EST -----  Subject: Referral Request    QUESTIONS   Reason for referral request? Physical Therapy   Has the physician seen you for this condition before? No   Preferred Specialist (if applicable)? Dee Pinto  Do you already have an appointment scheduled? No  Additional Information for Provider? Patient mother Guzman Rivera called to   request that her daughter (the pt) receive Physical Therapy due to her   recent struggle with ongoing migraines has limited her mobility and she   would like to see her daughter build back muscle strength Please return   call to patient cell phone confirmed   ---------------------------------------------------------------------------  --------------  [address]  What is the best way for the office to contact you? OK to leave message on   voicemail  Preferred Call Back Phone Number?  [phone]

## 2022-02-22 ENCOUNTER — OFFICE VISIT (OUTPATIENT)
Dept: FAMILY MEDICINE CLINIC | Age: 32
End: 2022-02-22
Payer: MEDICAID

## 2022-02-22 VITALS
HEART RATE: 97 BPM | WEIGHT: 139.2 LBS | HEIGHT: 61 IN | TEMPERATURE: 98.2 F | OXYGEN SATURATION: 92 % | RESPIRATION RATE: 16 BRPM | BODY MASS INDEX: 26.28 KG/M2 | SYSTOLIC BLOOD PRESSURE: 125 MMHG | DIASTOLIC BLOOD PRESSURE: 90 MMHG

## 2022-02-22 DIAGNOSIS — Z86.73 HISTORY OF CVA (CEREBROVASCULAR ACCIDENT): ICD-10-CM

## 2022-02-22 DIAGNOSIS — G43.909 MIGRAINE WITHOUT STATUS MIGRAINOSUS, NOT INTRACTABLE, UNSPECIFIED MIGRAINE TYPE: ICD-10-CM

## 2022-02-22 DIAGNOSIS — M62.81 MUSCLE WEAKNESS: Primary | ICD-10-CM

## 2022-02-22 PROCEDURE — 99213 OFFICE O/P EST LOW 20 MIN: CPT | Performed by: NURSE PRACTITIONER

## 2022-02-22 RX ORDER — LEVONORGESTREL / ETHINYL ESTRADIOL AND ETHINYL ESTRADIOL 150-30(84)
1 KIT ORAL DAILY
COMMUNITY
Start: 2022-02-07

## 2022-02-22 RX ORDER — METOPROLOL TARTRATE 25 MG/1
TABLET, FILM COATED ORAL
COMMUNITY
Start: 2022-02-16 | End: 2022-03-04 | Stop reason: SDUPTHER

## 2022-02-22 NOTE — PROGRESS NOTES
Assessment/Plan:     Diagnoses and all orders for this visit:    1. Muscle weakness  -     REFERRAL TO PHYSICAL THERAPY  - Unchanged, will refer to physical therapy for further treatment. Follow-up if symptoms persist    2. 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 Migraine without status migrainosus, not intractable, unspecified migraine type  - Managed by neurology            Discussed expected course/resolution/complications of diagnosis in detail with patient. Medication risks/benefits/costs/interactions/alternatives discussed with patient. Pt was given after visit summary which includes diagnoses, current medications & vitals. Pt expressed understanding with the diagnosis and plan        Subjective:      Nahun Cruz is a 32 y.o. female who presents for had concerns including Other (Referral for PT). Here today for referral to PT. Had migraines several days in a row and was in the bed for many weeks  Did some PT last year after a hospital stay  Was recently put on birth control pills to help with cramps and hopefull headaches. Labs done last week at nephrology. Current Outpatient Medications   Medication Sig Dispense Refill    L-Norgest&E Estradiol-E Estrad 0.15 mg-30 mcg (84)/10 mcg (7) 3MPk Take 1 Tablet by mouth daily.  metoprolol tartrate (LOPRESSOR) 25 mg tablet TAKE 1/2 TAB BY MOUTH TWICE A DAY      dicyclomine (BENTYL) 20 mg tablet TAKE 1 TAB BY MOUTH EVERY SIX (6) HOURS AS NEEDED FOR ABDOMINAL CRAMPS. 180 Tablet 0    cyclobenzaprine (FLEXERIL) 5 mg tablet Take 1 Tablet by mouth three (3) times daily as needed for Muscle Spasm(s). 90 Tablet 2    pantoprazole (PROTONIX) 40 mg tablet Take 1 Tablet by mouth daily. 622 Tablet 0    folic acid (FOLVITE) 1 mg tablet Take 1 Tablet by mouth two (2) times a day.  180 Tablet 1    Aimovig Autoinjector 140 mg/mL injection INJECT 1 SYRINGE ONCE MONTHLY  ondansetron hcl (Zofran) 4 mg tablet Take 4 mg by mouth every eight (8) hours as needed for Nausea or Vomiting.  albuterol (PROVENTIL HFA, VENTOLIN HFA, PROAIR HFA) 90 mcg/actuation inhaler Take 1 Puff by inhalation every four (4) hours as needed for Wheezing. Please fill whichever albuterol 'brand' covered by pt's formulary 1 Inhaler 3    eletriptan (Relpax) 40 mg tablet Take 40 mg by mouth once as needed. may repeat in 2 hours if necessary      potassium citrate (UROCIT-K5) 5 mEq (540 mg) TbER tablet Take 5 mEq by mouth daily. 4    sodium bicarbonate 650 mg tablet TAKE 1 TABLET BY MOUTH AFTER MEALS AS DIRECTED  1    fexofenadine (ALLEGRA) 180 mg tablet Take  by mouth.          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;    NSAID induced gastritis     Peptic ulcer     Peptic ulcer disease      Past Surgical History:   Procedure Laterality Date    HX CHOLECYSTECTOMY      HX FEMUR FRACTURE TX       Family History   Problem Relation Age of Onset    Hypertension Mother      Social History     Socioeconomic History    Marital status: SINGLE     Spouse name: Not on file    Number of children: Not on file    Years of education: Not on file    Highest education level: Not on file   Occupational History    Not on file   Tobacco Use    Smoking status: Never Smoker    Smokeless tobacco: Never Used   Vaping Use    Vaping Use: Never used   Substance and Sexual Activity    Alcohol use: No    Drug use: No    Sexual activity: Not on file   Other Topics Concern    Not on file   Social History Narrative    ** Merged History Encounter **          Social Determinants of Health     Financial Resource Strain:     Difficulty of Paying Living Expenses: Not on file   Food Insecurity:     Worried About Running Out of Food in the Last Year: Not on file    Lore of Food in the Last Year: Not on file   Transportation Needs:     Lack of Transportation (Medical): Not on file    Lack of Transportation (Non-Medical): Not on file   Physical Activity:     Days of Exercise per Week: Not on file    Minutes of Exercise per Session: Not on file   Stress:     Feeling of Stress : Not on file   Social Connections:     Frequency of Communication with Friends and Family: Not on file    Frequency of Social Gatherings with Friends and Family: Not on file    Attends Protestant Services: Not on file    Active Member of [address] [place] [address] or Organizations: Not on file    Attends Club or Organization Meetings: Not on file    Marital Status: Not on file   Intimate Partner Violence:     Fear of Current or Ex-Partner: Not on file    Emotionally Abused: Not on file    Physically Abused: Not on file    Sexually Abused: Not on file   Housing Stability:     Unable to Pay for Housing in the Last Year: Not on file    Number of [place] in the Last Year: Not on file    Unstable Housing in the Last Year: Not on file       HPI      ROS:   Review of Systems   Constitutional: Negative for chills, fever and malaise/fatigue. Eyes: Negative for blurred vision. Respiratory: Negative for cough and shortness of breath. Cardiovascular: Negative for chest pain, palpitations and leg swelling. Musculoskeletal:        Weakness   Neurological: Negative for dizziness and headaches. Objective:     Visit Vitals  BP (!) 125/90 (BP 1 Location: Left upper arm, BP Patient Position: Sitting, BP Cuff Size: Adult)   Pulse 97   Temp 98.2 °F (36.8 °C) (Temporal)   Resp 16   Ht 5' 1\" (1.549 m)   Wt 139 lb 3.2 oz (63.1 kg)   LMP 01/23/2022 (Exact Date)   SpO2 92%   BMI 26.30 kg/m²         Vitals and Nurse Documentation reviewed. Physical Exam  Constitutional:       General: She is not in acute distress. Appearance: She is not diaphoretic. HENT:      Head: Normocephalic and atraumatic. Cardiovascular:      Rate and Rhythm: Normal rate and regular rhythm. Heart sounds: Normal heart sounds. No murmur heard. No friction rub. No gallop. Pulmonary:      Effort: Pulmonary effort is normal. No respiratory distress. Breath sounds: Normal breath sounds. No wheezing or rales. Skin:     General: Skin is warm and dry. Coloration: Skin is not pale. Neurological:      Mental Status: She is alert and oriented to person, place, and time. Mental status is at baseline. Motor: Weakness present. Comments: In wheelchair   Psychiatric:         Mood and Affect: Affect normal. Mood is not anxious or depressed. Behavior: Behavior is not agitated. Thought Content: Thought content does not include homicidal or suicidal ideation.          Results for orders placed or performed in visit on 04/13/21   NOVEL CORONAVIRUS (COVID-19)   Result Value Ref Range    SARS-CoV-2, ALL Negative

## 2022-02-22 NOTE — PROGRESS NOTES
Identified pt with two pt identifiers(name and ). Reviewed record in preparation for visit and have obtained necessary documentation. Chief Complaint   Patient presents with    Other     Referral for PT        Health Maintenance Due   Topic    Hepatitis C Screening     DTaP/Tdap/Td series (1 - Tdap)    Cervical cancer screen     COVID-19 Vaccine (2 - Pfizer 3-dose series)    Depression Screen     Flu Vaccine (1)       Coordination of Care Questionnaire:  :   1) Have you been to an emergency room, urgent care, or hospitalized since your last visit? If yes, where when, and reason for visit? no      2. Have seen or consulted any other health care provider since your last visit? If yes, where when, and reason for visit? Yes, Nephrologist         Patient is accompanied by self I have received verbal consent from Ina Brittle to discuss any/all medical information while they are present in the room.

## 2022-03-02 NOTE — TELEPHONE ENCOUNTER
LVM to schedule apt     ----- Message from [address] sent at 3/1/2022 11:37 AM EST -----  Subject: Message to Provider    QUESTIONS  Information for Provider? Osmani Cartagena called in and states that she needs   pain medication due to pain that she is having from physical therapy-   please update HIPPA. Gillian Ryder please advise   ---------------------------------------------------------------------------  --------------  CALL BACK INFO  What is the best way for the office to contact you? OK to leave message on   voicemail  Preferred Call Back Phone Number? [phone]  ---------------------------------------------------------------------------  --------------  SCRIPT ANSWERS  Relationship to Patient?  Self

## 2022-03-04 ENCOUNTER — OFFICE VISIT (OUTPATIENT)
Dept: FAMILY MEDICINE CLINIC | Age: 32
End: 2022-03-04
Payer: MEDICAID

## 2022-03-04 VITALS
TEMPERATURE: 98.1 F | HEART RATE: 87 BPM | BODY MASS INDEX: 26.62 KG/M2 | RESPIRATION RATE: 16 BRPM | WEIGHT: 141 LBS | SYSTOLIC BLOOD PRESSURE: 135 MMHG | HEIGHT: 61 IN | OXYGEN SATURATION: 97 % | DIASTOLIC BLOOD PRESSURE: 104 MMHG

## 2022-03-04 DIAGNOSIS — Z86.73 HISTORY OF CVA (CEREBROVASCULAR ACCIDENT): ICD-10-CM

## 2022-03-04 DIAGNOSIS — I10 HYPERTENSION, UNSPECIFIED TYPE: Primary | ICD-10-CM

## 2022-03-04 DIAGNOSIS — M62.838 MUSCLE SPASM: ICD-10-CM

## 2022-03-04 DIAGNOSIS — R52 PAIN: ICD-10-CM

## 2022-03-04 PROCEDURE — 99214 OFFICE O/P EST MOD 30 MIN: CPT | Performed by: NURSE PRACTITIONER

## 2022-03-04 RX ORDER — METOPROLOL TARTRATE 25 MG/1
25 TABLET, FILM COATED ORAL 2 TIMES DAILY
Qty: 180 TABLET | Refills: 0 | Status: SHIPPED | OUTPATIENT
Start: 2022-03-04 | End: 2022-06-15

## 2022-03-04 RX ORDER — CYCLOBENZAPRINE HCL 10 MG
10 TABLET ORAL
Qty: 30 TABLET | Refills: 2 | Status: SHIPPED | OUTPATIENT
Start: 2022-03-04 | End: 2022-05-13

## 2022-03-04 RX ORDER — TRAMADOL HYDROCHLORIDE 50 MG/1
50 TABLET ORAL
Qty: 10 TABLET | Refills: 0 | Status: SHIPPED | OUTPATIENT
Start: 2022-03-04 | End: 2022-03-07

## 2022-03-04 NOTE — PROGRESS NOTES
Assessment/Plan:     Diagnoses and all orders for this visit:    1. Hypertension, unspecified type  -     metoprolol tartrate (LOPRESSOR) 25 mg tablet; Take 1 Tablet by mouth two (2) times a day. - Worsening, will increase metoprolol to BID as directed, side effects discussed. Monitor BP at home and follow up with readings 3-4 weeks    2. Pain  -     cyclobenzaprine (FLEXERIL) 10 mg tablet; Take 1 Tablet by mouth three (3) times daily as needed for Muscle Spasm(s). -     traMADoL (ULTRAM) 50 mg tablet; Take 1 Tablet by mouth every six (6) hours as needed for Pain for up to 3 days. Max Daily Amount: 200 mg.  - Worsening, will increase cyclobenzaprine as directed, side effects discussed, will give tramadol count of 10 tabs for ongoing pain.  checked and appropriate. 3. Muscle spasm  -     cyclobenzaprine (FLEXERIL) 10 mg tablet; Take 1 Tablet by mouth three (3) times daily as needed for Muscle Spasm(s). -     traMADoL (ULTRAM) 50 mg tablet; Take 1 Tablet by mouth every six (6) hours as needed for Pain for up to 3 days. Max Daily Amount: 200 mg.  - Worsening, will increase cyclobenzaprine as directed, side effects discussed, will give tramadol count of 10 tabs for ongoing pain.  checked and appropriate. 4.  History of CVA (cerebrovascular accident)   -weakness, in wheelchair, home PT ordered    Follow-up and Dispositions    · Return in about 4 weeks (around 4/1/2022). Discussed expected course/resolution/complications of diagnosis in detail with patient. Medication risks/benefits/costs/interactions/alternatives discussed with patient. Pt was given after visit summary which includes diagnoses, current medications & vitals. Pt expressed understanding with the diagnosis and plan        Subjective:      Lulu Guajardo is a 32 y.o. female who presents for had concerns including Medication Evaluation (Muscle pain in generalized areas; discuss BP medication).      Here today for pain in muscles from starting PT, doing PT twice a week  Having pain from shoulders al the way down almost every day. Not improving. Rated 6/10  Hurts when sitting and always hurts when moving. Tylenol has not really helped. Cyclobenzaprine has helped some    HTN  Elevated today and elevated at PT this week. Checks at home, readings have been 130-140/100. Started on metoprolol about 8 months ago, changed from carvedilol because was making it too low    Current Outpatient Medications   Medication Sig Dispense Refill    metoprolol tartrate (LOPRESSOR) 25 mg tablet Take 1 Tablet by mouth two (2) times a day. 180 Tablet 0    cyclobenzaprine (FLEXERIL) 10 mg tablet Take 1 Tablet by mouth three (3) times daily as needed for Muscle Spasm(s). 30 Tablet 2    traMADoL (ULTRAM) 50 mg tablet Take 1 Tablet by mouth every six (6) hours as needed for Pain for up to 3 days. Max Daily Amount: 200 mg. 10 Tablet 0    L-Norgest&E Estradiol-E Estrad 0.15 mg-30 mcg (84)/10 mcg (7) 3MPk Take 1 Tablet by mouth daily.  dicyclomine (BENTYL) 20 mg tablet TAKE 1 TAB BY MOUTH EVERY SIX (6) HOURS AS NEEDED FOR ABDOMINAL CRAMPS. 180 Tablet 0    pantoprazole (PROTONIX) 40 mg tablet Take 1 Tablet by mouth daily. 830 Tablet 0    folic acid (FOLVITE) 1 mg tablet Take 1 Tablet by mouth two (2) times a day. 180 Tablet 1    Aimovig Autoinjector 140 mg/mL injection INJECT 1 SYRINGE ONCE MONTHLY      ondansetron hcl (Zofran) 4 mg tablet Take 4 mg by mouth every eight (8) hours as needed for Nausea or Vomiting.  albuterol (PROVENTIL HFA, VENTOLIN HFA, PROAIR HFA) 90 mcg/actuation inhaler Take 1 Puff by inhalation every four (4) hours as needed for Wheezing. Please fill whichever albuterol 'brand' covered by pt's formulary 1 Inhaler 3    eletriptan (Relpax) 40 mg tablet Take 40 mg by mouth once as needed. may repeat in 2 hours if necessary      fexofenadine (ALLEGRA) 180 mg tablet Take  by mouth.       potassium citrate (UROCIT-K5) 5 mEq (540 mg) TbER tablet Take 5 mEq by mouth daily.   4    sodium bicarbonate 650 mg tablet TAKE 1 TABLET BY MOUTH AFTER MEALS AS DIRECTED  1       Allergies   Allergen Reactions    Ajovy Autoinjector [Fremanezumab-Vfrm] Shortness of Breath, Cough and Vertigo    Ibuprofen Cold [Pseudoephedrine-Ibuprofen] Other (comments)    Lodine [Etodolac] Other (comments)     Headache      Naprosyn [Naproxen] Other (comments)    Nsaids (Non-Steroidal Anti-Inflammatory Drug) Nausea and Vomiting    Topamax [Topiramate] Other (comments)     Abdominal pain, Lactic Acidosis     Past Medical History:   Diagnosis Date    [facility] [address]     Cerebrovascular accident (CVA) ([facility] [address]      in In utero; 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; right side of brain; 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;    NSAID induced gastritis     Peptic ulcer     Peptic ulcer disease      Past Surgical History:   Procedure Laterality Date    HX CHOLECYSTECTOMY      HX FEMUR FRACTURE TX       Family History   Problem Relation Age of Onset    Hypertension Mother      Social History     Socioeconomic History    Marital status: SINGLE     Spouse name: Not on file    Number of children: Not on file    Years of education: Not on file    Highest education level: Not on file   Occupational History    Not on file   Tobacco Use    Smoking status: Never Smoker    Smokeless tobacco: Never Used   Vaping Use    Vaping Use: Never used   Substance and Sexual Activity    Alcohol use: No    Drug use: No    Sexual activity: Not on file   Other Topics Concern    Not on file   Social History Narrative    ** Merged History Encounter **          Social Determinants of Health     Financial Resource Strain:     Difficulty of Paying Living Expenses: Not on file   Food Insecurity:     Worried About Running Out of Food in the Last Year: Not on file    Lore of Food in the Last Year: Not on file Transportation Needs:     Lack of Transportation (Medical): Not on file    Lack of Transportation (Non-Medical): Not on file   Physical Activity:     Days of Exercise per Week: Not on file    Minutes of Exercise per Session: Not on file   Stress:     Feeling of Stress : Not on file   Social Connections:     Frequency of Communication with Friends and Family: Not on file    Frequency of Social Gatherings with Friends and Family: Not on file    Attends Oriental orthodox Services: Not on file    Active Member of [address] [place] [address] or Organizations: Not on file    Attends Club or Organization Meetings: Not on file    Marital Status: Not on file   Intimate Partner Violence:     Fear of Current or Ex-Partner: Not on file    Emotionally Abused: Not on file    Physically Abused: Not on file    Sexually Abused: Not on file   Housing Stability:     Unable to Pay for Housing in the Last Year: Not on file    Number of [place] in the Last Year: Not on file    Unstable Housing in the Last Year: Not on file       HPI      ROS:   Review of Systems   Constitutional: Negative for chills, fever and malaise/fatigue. Eyes: Negative for blurred vision. Respiratory: Negative for cough and shortness of breath. Cardiovascular: Negative for chest pain, palpitations and leg swelling. Neurological: Negative for dizziness and headaches. Objective:     Visit Vitals  BP (!) 135/104 (BP 1 Location: Left upper arm, BP Patient Position: Sitting, BP Cuff Size: Adult)   Pulse 87   Temp 98.1 °F (36.7 °C) (Temporal)   Resp 16   Ht 5' 1\" (1.549 m)   Wt 141 lb (64 kg)   SpO2 97%   BMI 26.64 kg/m²         Vitals and Nurse Documentation reviewed. Physical Exam  Constitutional:       General: She is not in acute distress. Appearance: She is not diaphoretic. HENT:      Head: Normocephalic and atraumatic. Cardiovascular:      Rate and Rhythm: Normal rate and regular rhythm. Heart sounds: Normal heart sounds.  No murmur heard.  No friction rub. No gallop. Pulmonary:      Effort: Pulmonary effort is normal. No respiratory distress. Breath sounds: Normal breath sounds. No wheezing or rales. Skin:     General: Skin is warm and dry. Coloration: Skin is not pale. Neurological:      Mental Status: She is alert and oriented to person, place, and time. Comments: In wheelchair    Psychiatric:         Mood and Affect: Affect normal. Mood is not anxious or depressed. Behavior: Behavior is not agitated. Thought Content: Thought content does not include homicidal or suicidal ideation.          Results for orders placed or performed in visit on 04/13/21   NOVEL CORONAVIRUS (COVID-19)   Result Value Ref Range    SARS-CoV-2, ALL Negative

## 2022-03-04 NOTE — PROGRESS NOTES
Identified pt with two pt identifiers(name and ). Reviewed record in preparation for visit and have obtained necessary documentation. Chief Complaint   Patient presents with    Medication Evaluation     Muscle pain in generalized areas; discuss BP medication        Health Maintenance Due   Topic    Hepatitis C Screening     DTaP/Tdap/Td series (1 - Tdap)    Cervical cancer screen     Flu Vaccine (1)    COVID-19 Vaccine (3 - Booster for [employer] series)       Coordination of Care Questionnaire:  :   1) Have you been to an emergency room, urgent care, or hospitalized since your last visit? If yes, where when, and reason for visit? no      2. Have seen or consulted any other health care provider since your last visit? If yes, where when, and reason for visit?  no        Patient is accompanied by self I have received verbal consent from Isidro Wolf to discuss any/all medical information while they are present in the room.

## 2022-03-04 NOTE — PATIENT INSTRUCTIONS
Muscle Cramps: Care Instructions  Your Care Instructions     A muscle cramp occurs when a muscle tightens up suddenly. A cramp often happens in the legs. A muscle cramp is also called a muscle spasm or a charley horse. Muscle cramps usually last less than a minute. However, the pain may last for several minutes. Leg cramps that occur at night may wake you up. Heavy exercise, dehydration, and being overweight can increase your risk of getting cramps. An imbalance of certain chemicals in your blood, called electrolytes, can also lead to muscle cramps. Pregnant women sometimes get muscle cramps during sleep. Muscle cramps can be treated by stretching and massaging the muscle. If cramps keep coming back, your doctor may prescribe medicine that relaxes your muscles. Follow-up care is a key part of your treatment and safety. Be sure to make and go to all appointments, and call your doctor if you are having problems. It's also a good idea to know your test results and keep a list of the medicines you take. How can you care for yourself at home? · Drink plenty of fluids to prevent dehydration. Choose water and other clear liquids until you feel better. If you have kidney, heart, or liver disease and have to limit fluids, talk with your doctor before you increase the amount of fluids you drink. · Stretch your muscles every day, especially before and after exercise and at bedtime. Regular stretching can relax your muscles and may prevent cramps. · Do not suddenly increase the amount of exercise you get. Increase your exercise a little each week. · When you get a cramp, stretch and massage the muscle. You can also take a warm shower or bath to relax the muscle. A heating pad placed on the muscle can also help. · Take a daily multivitamin supplement. · Ask your doctor if you can take an over-the-counter pain medicine, such as acetaminophen (Tylenol), ibuprofen (Advil, Motrin), or naproxen (Aleve).  Be safe with medicines. Read and follow all instructions on the label. When should you call for help? Watch closely for changes in your health, and be sure to contact your doctor if:    · You get muscle cramps often that do not go away after home treatment.     · Your muscle cramps often wake you up at night.     · You do not get better as expected. Where can you learn more? Go to http://www.charles.com/  Enter I565 in the search box to learn more about \"Muscle Cramps: Care Instructions. \"  Current as of: July 1, 2021               Content Version: 13.0  © 3211-2893 [employer]. Care instructions adapted under license by [employer] (which disclaims liability or warranty for this information). If you have questions about a medical condition or this instruction, always ask your healthcare professional. [address] any warranty or liability for your use of this information. High Blood Pressure: Care Instructions  Overview     It's normal for blood pressure to go up and down throughout the day. But if it stays up, you have high blood pressure. Another name for high blood pressure is hypertension. Despite what a lot of people think, high blood pressure usually doesn't cause headaches or make you feel dizzy or lightheaded. It usually has no symptoms. But it does increase your risk of stroke, heart attack, and other problems. You and your doctor will talk about your risks of these problems based on your blood pressure. Your doctor will give you a goal for your blood pressure. Your goal will be based on your health and your age. Lifestyle changes, such as eating healthy and being active, are always important to help lower blood pressure. You might also take medicine to reach your blood pressure goal.  Follow-up care is a key part of your treatment and safety. Be sure to make and go to all appointments, and call your doctor if you are having problems. It's also a good idea to know your test results and keep a list of the medicines you take. How can you care for yourself at home? Medical treatment  · If you stop taking your medicine, your blood pressure will go back up. You may take one or more types of medicine to lower your blood pressure. Be safe with medicines. Take your medicine exactly as prescribed. Call your doctor if you think you are having a problem with your medicine. · Talk to your doctor before you start taking aspirin every day. Aspirin can help certain people lower their risk of a heart attack or stroke. But taking aspirin isn't right for everyone, because it can cause serious bleeding. · See your doctor regularly. You may need to see the doctor more often at first or until your blood pressure comes down. · If you are taking blood pressure medicine, talk to your doctor before you take decongestants or anti-inflammatory medicine, such as ibuprofen. Some of these medicines can raise blood pressure. · Learn how to check your blood pressure at home. Lifestyle changes  · Stay at a healthy weight. This is especially important if you put on weight around the waist. Losing even 10 pounds can help you lower your blood pressure. · If your doctor recommends it, get more exercise. Walking is a good choice. Bit by bit, increase the amount you walk every day. Try for at least 30 minutes on most days of the week. You also may want to swim, bike, or do other activities. · Avoid or limit alcohol. Talk to your doctor about whether you can drink any alcohol. · Try to limit how much sodium you eat to less than 2,300 milligrams (mg) a day. Your doctor may ask you to try to eat less than 1,500 mg a day. · Eat plenty of fruits (such as bananas and oranges), vegetables, legumes, whole grains, and low-fat dairy products. · Lower the amount of saturated fat in your diet. Saturated fat is found in animal products such as milk, cheese, and meat.  Limiting these foods may help you lose weight and also lower your risk for heart disease. · Do not smoke. Smoking increases your risk for heart attack and stroke. If you need help quitting, talk to your doctor about stop-smoking programs and medicines. These can increase your chances of quitting for good. When should you call for help? Call 911  anytime you think you may need emergency care. This may mean having symptoms that suggest that your blood pressure is causing a serious heart or blood vessel problem. Your blood pressure may be over 180/120. For example, call 911 if:    · You have symptoms of a heart attack. These may include:  ? Chest pain or pressure, or a strange feeling in the chest.  ? Sweating. ? Shortness of breath. ? Nausea or vomiting. ? Pain, pressure, or a strange feeling in the back, neck, jaw, or upper belly or in one or both shoulders or arms. ? Lightheadedness or sudden weakness. ? A fast or irregular heartbeat.     · You have symptoms of a stroke. These may include:  ? Sudden numbness, tingling, weakness, or loss of movement in your face, arm, or leg, especially on only one side of your body. ? Sudden vision changes. ? Sudden trouble speaking. ? Sudden confusion or trouble understanding simple statements. ? Sudden problems with walking or balance. ? A sudden, severe headache that is different from past headaches.     · You have severe back or belly pain. Do not wait until your blood pressure comes down on its own. Get help right away. Call your doctor now or seek immediate care if:    · Your blood pressure is much higher than normal (such as 180/120 or higher), but you don't have symptoms.     · You think high blood pressure is causing symptoms, such as:  ? Severe headache.  ? Blurry vision. Watch closely for changes in your health, and be sure to contact your doctor if:    · Your blood pressure measures higher than your doctor recommends at least 2 times.  That means the top number is higher or the bottom number is higher, or both.     · You think you may be having side effects from your blood pressure medicine. Where can you learn more? Go to http://www.gray.com/  Enter [id number] in the search box to learn more about \"High Blood Pressure: Care Instructions. \"  Current as of: April 29, 2021               Content Version: 13.0  © 6996-0097 [employer]. Care instructions adapted under license by [employer] (which disclaims liability or warranty for this information). If you have questions about a medical condition or this instruction, always ask your healthcare professional. [place] [address] any warranty or liability for your use of this information.

## 2022-03-19 PROBLEM — R42 DIZZINESS: Status: ACTIVE | Noted: 2018-08-23

## 2022-03-19 PROBLEM — R06.00 DYSPNEA: Status: ACTIVE | Noted: 2018-08-23

## 2022-03-20 PROBLEM — K21.9 GERD (GASTROESOPHAGEAL REFLUX DISEASE): Status: ACTIVE | Noted: 2018-08-23

## 2022-03-23 ENCOUNTER — OFFICE VISIT (OUTPATIENT)
Dept: FAMILY MEDICINE CLINIC | Age: 32
End: 2022-03-23

## 2022-03-23 VITALS
BODY MASS INDEX: 26.62 KG/M2 | TEMPERATURE: 97.1 F | HEIGHT: 61 IN | OXYGEN SATURATION: 98 % | WEIGHT: 141 LBS | SYSTOLIC BLOOD PRESSURE: 151 MMHG | HEART RATE: 121 BPM | DIASTOLIC BLOOD PRESSURE: 114 MMHG | RESPIRATION RATE: 16 BRPM

## 2022-03-23 DIAGNOSIS — I10 HYPERTENSION, UNSPECIFIED TYPE: Primary | ICD-10-CM

## 2022-03-23 PROCEDURE — 99214 OFFICE O/P EST MOD 30 MIN: CPT | Performed by: NURSE PRACTITIONER

## 2022-03-23 RX ORDER — AMLODIPINE BESYLATE 2.5 MG/1
2.5 TABLET ORAL DAILY
Qty: 30 TABLET | Refills: 1 | Status: SHIPPED | OUTPATIENT
Start: 2022-03-23 | End: 2022-04-01 | Stop reason: DRUGHIGH

## 2022-03-23 NOTE — PROGRESS NOTES
Rachael Padilla is a 32 y.o. female who presents to clinic today for the following:    Chief Complaint   Patient presents with    Hypertension    Follow-up       Vitals:    03/23/22 1417   BP: (!) 151/114   Pulse: (!) 121   Resp: 16   Temp: 97.1 °F (36.2 °C)   TempSrc: Skin   SpO2: 98%   Weight: 141 lb (64 kg)   Height: 5' 1\" (1.549 m)       Body mass index is 26.64 kg/m². Patients past medical, surgical and family histories were reviewed. Allergies and Medications reviewed and updated. Current Outpatient Medications:     amLODIPine (NORVASC) 2.5 mg tablet, Take 1 Tablet by mouth daily. , Disp: 30 Tablet, Rfl: 1    metoprolol tartrate (LOPRESSOR) 25 mg tablet, Take 1 Tablet by mouth two (2) times a day., Disp: 180 Tablet, Rfl: 0    cyclobenzaprine (FLEXERIL) 10 mg tablet, Take 1 Tablet by mouth three (3) times daily as needed for Muscle Spasm(s). , Disp: 30 Tablet, Rfl: 2    L-Norgest&E Estradiol-E Estrad 0.15 mg-30 mcg (84)/10 mcg (7) 3MPk, Take 1 Tablet by mouth daily. , Disp: , Rfl:     dicyclomine (BENTYL) 20 mg tablet, TAKE 1 TAB BY MOUTH EVERY SIX (6) HOURS AS NEEDED FOR ABDOMINAL CRAMPS., Disp: 180 Tablet, Rfl: 0    pantoprazole (PROTONIX) 40 mg tablet, Take 1 Tablet by mouth daily. , Disp: 180 Tablet, Rfl: 0    folic acid (FOLVITE) 1 mg tablet, Take 1 Tablet by mouth two (2) times a day., Disp: 180 Tablet, Rfl: 1    Aimovig Autoinjector 140 mg/mL injection, INJECT 1 SYRINGE ONCE MONTHLY, Disp: , Rfl:     ondansetron hcl (Zofran) 4 mg tablet, Take 4 mg by mouth every eight (8) hours as needed for Nausea or Vomiting., Disp: , Rfl:     albuterol (PROVENTIL HFA, VENTOLIN HFA, PROAIR HFA) 90 mcg/actuation inhaler, Take 1 Puff by inhalation every four (4) hours as needed for Wheezing. Please fill whichever albuterol 'brand' covered by pt's formulary, Disp: 1 Inhaler, Rfl: 3    eletriptan (Relpax) 40 mg tablet, Take 40 mg by mouth once as needed.  may repeat in 2 hours if necessary, Disp: , Rfl:     potassium citrate (UROCIT-K5) 5 mEq (540 mg) TbER tablet, Take 5 mEq by mouth daily. , Disp: , Rfl: 4    sodium bicarbonate 650 mg tablet, TAKE 1 TABLET BY MOUTH AFTER MEALS AS DIRECTED, Disp: , Rfl: 1    fexofenadine (ALLEGRA) 180 mg tablet, Take  by mouth., Disp: , Rfl:     Allergies   Allergen Reactions    Ajovy Autoinjector [Fremanezumab-Vfrm] Shortness of Breath, Cough and Vertigo    Ibuprofen Cold [Pseudoephedrine-Ibuprofen] Other (comments)    Lodine [Etodolac] Other (comments)     Headache      Naprosyn [Naproxen] Other (comments)    Nsaids (Non-Steroidal Anti-Inflammatory Drug) Nausea and Vomiting    Topamax [Topiramate] Other (comments)     Abdominal pain, Lactic Acidosis       Past Medical History:   Diagnosis Date    [facility] [facility]     Cerebrovascular accident (CVA) ([facility] [address] [facility]      in In utero; 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; right side of brain; 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;    NSAID induced gastritis     Peptic ulcer     Peptic ulcer disease        Past Surgical History:   Procedure Laterality Date    HX CHOLECYSTECTOMY      HX FEMUR FRACTURE TX         Family History   Problem Relation Age of Onset    Hypertension Mother        Social History     Socioeconomic History    Marital status: SINGLE     Spouse name: Not on file    Number of children: Not on file    Years of education: Not on file    Highest education level: Not on file   Occupational History    Not on file   Tobacco Use    Smoking status: Never Smoker    Smokeless tobacco: Never Used   Vaping Use    Vaping Use: Never used   Substance and Sexual Activity    Alcohol use: No    Drug use: No    Sexual activity: Not on file   Other Topics Concern    Not on file   Social History Narrative    ** Merged History Encounter **          Social Determinants of Health     Financial Resource Strain:     Difficulty of Paying Living Expenses: Not on file   Food Insecurity:     Worried About [address] in the Last Year: Not on file    Lore of Food in the Last Year: Not on file   Transportation Needs:     Lack of Transportation (Medical): Not on file    Lack of Transportation (Non-Medical): Not on file   Physical Activity:     Days of Exercise per Week: Not on file    Minutes of Exercise per Session: Not on file   Stress:     Feeling of Stress : Not on file   Social Connections:     Frequency of Communication with Friends and Family: Not on file    Frequency of Social Gatherings with Friends and Family: Not on file    Attends [employer] Services: Not on file    Active Member of [address] [place] [address] or Organizations: Not on file    Attends Club or Organization Meetings: Not on file    Marital Status: Not on file   Intimate Partner Violence:     Fear of Current or Ex-Partner: Not on file    Emotionally Abused: Not on file    Physically Abused: Not on file    Sexually Abused: Not on file   Housing Stability:     Unable to Pay for Housing in the Last Year: Not on file    Number of [place] in the Last Year: Not on file    Unstable Housing in the Last Year: Not on file           Physical Exam  Vitals and nursing note reviewed. HENT:      Head: Normocephalic. Nose: Nose normal.      Mouth/Throat:      Mouth: Mucous membranes are moist.   Eyes:      Pupils: Pupils are equal, round, and reactive to light. Cardiovascular:      Rate and Rhythm: Normal rate and regular rhythm. Pulses: Normal pulses. Heart sounds: Normal heart sounds. Pulmonary:      Effort: Pulmonary effort is normal.      Breath sounds: Normal breath sounds. Abdominal:      General: Abdomen is flat. Musculoskeletal:         General: Normal range of motion. Cervical back: Normal range of motion. Skin:     General: Skin is warm. Capillary Refill: Capillary refill takes less than 2 seconds. Neurological:      General: No focal deficit present. Mental Status: She is alert and oriented to person, place, and time. Psychiatric:         Mood and Affect: Mood normal.         Behavior: Behavior normal.          Patient was seen in office accompanied by mother. She is here for blood pressure follow-up. Blood pressure is still elevated however during visit did drop to a diastolic of 347. She has been taking medication as prescribed. I have suggested we add amlodipine. She previously was on this medication however is no longer. I reviewed her chart to determine why this may have been changed. Unable to find any documentation. There is no drug allergies that are known. We will add amlodipine 2.5 mg daily. I have asked patient to check blood pressure daily for the next 7 to 10 days and follow-up with a virtual visit. We discussed reducing salt in diet. ,  Drink more water. Patient does report occasional dizziness. Advised that dizziness continues or if she has worsening headache or chest pain to go to the emergency room. Follow-up sooner if needed. Review of Systems   Constitutional: Negative for fever and malaise/fatigue. HENT: Negative for congestion, sinus pain and sore throat. Respiratory: Negative for cough and shortness of breath. Cardiovascular: Negative for chest pain. Gastrointestinal: Negative for diarrhea, nausea and vomiting. Genitourinary: Negative for dysuria. Musculoskeletal: Negative. Skin: Negative. Neurological: Negative for dizziness and headaches. Endo/Heme/Allergies: Negative for environmental allergies. Psychiatric/Behavioral: Negative. No results found. No results found for this or any previous visit (from the past 24 hour(s)). Assessment and Plan:    Encounter Diagnoses   Name Primary?  Hypertension, unspecified type Yes                I have discussed the diagnosis with the patient and the intended plan as seen in the above orders. she has expressed understanding.   The patient has received an after-visit summary and questions were answered concerning future plans. I have discussed medication side effects and warnings with the patient as well.         Electronically Signed: Jose Lomas NP

## 2022-03-23 NOTE — PROGRESS NOTES
1. Have you been to the ER, urgent care clinic since your last visit? Hospitalized since your last visit? No    2. Have you seen or consulted any other health care providers outside of the [address] [place] [address] since your last visit? Include any pap smears or colon screening. No     Chief Complaint   Patient presents with    Hypertension    Follow-up     Health Maintenance Due   Topic Date Due    Hepatitis C Screening  Never done    DTaP/Tdap/Td series (1 - Tdap) Never done    Cervical cancer screen  Never done    Flu Vaccine (1) Never done    COVID-19 Vaccine (3 - Booster for Pfizer series) 10/21/2021     3 most recent PHQ Screens 3/23/2022   Little interest or pleasure in doing things Not at all   Feeling down, depressed, irritable, or hopeless Not at all   Total Score PHQ 2 0     Abuse Screening Questionnaire 3/23/2022   Do you ever feel afraid of your partner? N   Are you in a relationship with someone who physically or mentally threatens you? N   Is it safe for you to go home?  Y     Visit Vitals  BP (!) 151/114 (BP 1 Location: Right arm, BP Patient Position: Sitting, BP Cuff Size: Adult)   Pulse (!) 121   Temp 97.1 °F (36.2 °C) (Skin)   Resp 16   Ht 5' 1\" (1.549 m)   Wt 141 lb (64 kg)   SpO2 98%   BMI 26.64 kg/m²

## 2022-03-23 NOTE — PATIENT INSTRUCTIONS
Monitor BP at home the next 7-10 days  Goal is 140/90 and below  Move slow when getting used to new medication  Follow up in 2 weeks to discuss new numbers  Any Dizziness, Head ache Chest pain- go to ER     DASH Diet: Care Instructions  Your Care Instructions     The DASH diet is an eating plan that can help lower your blood pressure. DASH stands for Dietary Approaches to Stop Hypertension. Hypertension is high blood pressure. The DASH diet focuses on eating foods that are high in calcium, potassium, and magnesium. These nutrients can lower blood pressure. The foods that are highest in these nutrients are fruits, vegetables, low-fat dairy products, nuts, seeds, and legumes. But taking calcium, potassium, and magnesium supplements instead of eating foods that are high in those nutrients does not have the same effect. The DASH diet also includes whole grains, fish, and poultry. The DASH diet is one of several lifestyle changes your doctor may recommend to lower your high blood pressure. Your doctor may also want you to decrease the amount of sodium in your diet. Lowering sodium while following the DASH diet can lower blood pressure even further than just the DASH diet alone. Follow-up care is a key part of your treatment and safety. Be sure to make and go to all appointments, and call your doctor if you are having problems. It's also a good idea to know your test results and keep a list of the medicines you take. How can you care for yourself at home? Following the DASH diet  · Eat 4 to 5 servings of fruit each day. A serving is 1 medium-sized piece of fruit, ½ cup chopped or canned fruit, 1/4 cup dried fruit, or 4 ounces (½ cup) of fruit juice. Choose fruit more often than fruit juice. · Eat 4 to 5 servings of vegetables each day. A serving is 1 cup of lettuce or raw leafy vegetables, ½ cup of chopped or cooked vegetables, or 4 ounces (½ cup) of vegetable juice.  Choose vegetables more often than vegetable juice.  · Get 2 to 3 servings of low-fat and fat-free dairy each day. A serving is 8 ounces of milk, 1 cup of yogurt, or 1 ½ ounces of cheese. · Eat 6 to 8 servings of grains each day. A serving is 1 slice of bread, 1 ounce of dry cereal, or ½ cup of cooked rice, pasta, or cooked cereal. Try to choose whole-grain products as much as possible. · Limit lean meat, poultry, and fish to 2 servings each day. A serving is 3 ounces, about the size of a deck of cards. · Eat 4 to 5 servings of nuts, seeds, and legumes (cooked dried beans, lentils, and split peas) each week. A serving is 1/3 cup of nuts, 2 tablespoons of seeds, or ½ cup of cooked beans or peas. · Limit fats and oils to 2 to 3 servings each day. A serving is 1 teaspoon of vegetable oil or 2 tablespoons of salad dressing. · Limit sweets and added sugars to 5 servings or less a week. A serving is 1 tablespoon jelly or jam, ½ cup sorbet, or 1 cup of lemonade. · Eat less than 2,300 milligrams (mg) of sodium a day. If you limit your sodium to 1,500 mg a day, you can lower your blood pressure even more. · Be aware that all of these are the suggested number of servings for people who eat 1,800 to 2,000 calories a day. Your recommended number of servings may be different if you need more or fewer calories. Tips for success  · Start small. Do not try to make dramatic changes to your diet all at once. You might feel that you are missing out on your favorite foods and then be more likely to not follow the plan. Make small changes, and stick with them. Once those changes become habit, add a few more changes. · Try some of the following:  ? Make it a goal to eat a fruit or vegetable at every meal and at snacks. This will make it easy to get the recommended amount of fruits and vegetables each day. ? Try yogurt topped with fruit and nuts for a snack or healthy dessert. ? Add lettuce, tomato, cucumber, and onion to sandwiches.   ? Combine a ready-made pizza crust with low-fat mozzarella cheese and lots of vegetable toppings. Try using tomatoes, squash, spinach, broccoli, carrots, cauliflower, and onions. ? Have a variety of cut-up vegetables with a low-fat dip as an appetizer instead of chips and dip. ? Sprinkle sunflower seeds or chopped almonds over salads. Or try adding chopped walnuts or almonds to cooked vegetables. ? Try some vegetarian meals using beans and peas. Add garbanzo or kidney beans to salads. Make burritos and tacos with mashed crane beans or black beans. Where can you learn more? Go to http://www.charles.com/  Enter H967 in the search box to learn more about \"DASH Diet: Care Instructions. \"  Current as of: January 10, 2022               Content Version: 13.2  © 2006-2022 [employer]. Care instructions adapted under license by [employer] (which disclaims liability or warranty for this information). If you have questions about a medical condition or this instruction, always ask your healthcare professional. [place] [address] any warranty or liability for your use of this information. High Blood Pressure: Care Instructions  Overview     It's normal for blood pressure to go up and down throughout the day. But if it stays up, you have high blood pressure. Another name for high blood pressure is hypertension. Despite what a lot of people think, high blood pressure usually doesn't cause headaches or make you feel dizzy or lightheaded. It usually has no symptoms. But it does increase your risk of stroke, heart attack, and other problems. You and your doctor will talk about your risks of these problems based on your blood pressure. Your doctor will give you a goal for your blood pressure. Your goal will be based on your health and your age. Lifestyle changes, such as eating healthy and being active, are always important to help lower blood pressure.  You might also take medicine to reach your blood pressure goal.  Follow-up care is a key part of your treatment and safety. Be sure to make and go to all appointments, and call your doctor if you are having problems. It's also a good idea to know your test results and keep a list of the medicines you take. How can you care for yourself at home? Medical treatment  · If you stop taking your medicine, your blood pressure will go back up. You may take one or more types of medicine to lower your blood pressure. Be safe with medicines. Take your medicine exactly as prescribed. Call your doctor if you think you are having a problem with your medicine. · Talk to your doctor before you start taking aspirin every day. Aspirin can help certain people lower their risk of a heart attack or stroke. But taking aspirin isn't right for everyone, because it can cause serious bleeding. · See your doctor regularly. You may need to see the doctor more often at first or until your blood pressure comes down. · If you are taking blood pressure medicine, talk to your doctor before you take decongestants or anti-inflammatory medicine, such as ibuprofen. Some of these medicines can raise blood pressure. · Learn how to check your blood pressure at home. Lifestyle changes  · Stay at a healthy weight. This is especially important if you put on weight around the waist. Losing even 10 pounds can help you lower your blood pressure. · If your doctor recommends it, get more exercise. Walking is a good choice. Bit by bit, increase the amount you walk every day. Try for at least 30 minutes on most days of the week. You also may want to swim, bike, or do other activities. · Avoid or limit alcohol. Talk to your doctor about whether you can drink any alcohol. · Try to limit how much sodium you eat to less than 2,300 milligrams (mg) a day. Your doctor may ask you to try to eat less than 1,500 mg a day.   · Eat plenty of fruits (such as bananas and oranges), vegetables, legumes, whole grains, and low-fat dairy products. · Lower the amount of saturated fat in your diet. Saturated fat is found in animal products such as milk, cheese, and meat. Limiting these foods may help you lose weight and also lower your risk for heart disease. · Do not smoke. Smoking increases your risk for heart attack and stroke. If you need help quitting, talk to your doctor about stop-smoking programs and medicines. These can increase your chances of quitting for good. When should you call for help? Call 911  anytime you think you may need emergency care. This may mean having symptoms that suggest that your blood pressure is causing a serious heart or blood vessel problem. Your blood pressure may be over 180/120. For example, call 911 if:    · You have symptoms of a heart attack. These may include:  ? Chest pain or pressure, or a strange feeling in the chest.  ? Sweating. ? Shortness of breath. ? Nausea or vomiting. ? Pain, pressure, or a strange feeling in the back, neck, jaw, or upper belly or in one or both shoulders or arms. ? Lightheadedness or sudden weakness. ? A fast or irregular heartbeat.     · You have symptoms of a stroke. These may include:  ? Sudden numbness, tingling, weakness, or loss of movement in your face, arm, or leg, especially on only one side of your body. ? Sudden vision changes. ? Sudden trouble speaking. ? Sudden confusion or trouble understanding simple statements. ? Sudden problems with walking or balance. ? A sudden, severe headache that is different from past headaches.     · You have severe back or belly pain. Do not wait until your blood pressure comes down on its own. Get help right away. Call your doctor now or seek immediate care if:    · Your blood pressure is much higher than normal (such as 180/120 or higher), but you don't have symptoms.     · You think high blood pressure is causing symptoms, such as:  ? Severe headache.  ? Blurry vision.    Watch closely for changes in your health, and be sure to contact your doctor if:    · Your blood pressure measures higher than your doctor recommends at least 2 times. That means the top number is higher or the bottom number is higher, or both.     · You think you may be having side effects from your blood pressure medicine. Where can you learn more? Go to http://www.gray.com/  Enter [id number] in the search box to learn more about \"High Blood Pressure: Care Instructions. \"  Current as of: January 10, 2022               Content Version: 13.2  © 2006-2022 [employer]. Care instructions adapted under license by [employer] (which disclaims liability or warranty for this information). If you have questions about a medical condition or this instruction, always ask your healthcare professional. [address] any warranty or liability for your use of this information. Low Sodium Diet (2,000 Milligram): Care Instructions  Overview     Limiting sodium can be an important part of managing some health problems. The most common source of sodium is salt. People get most of the salt in their diet from canned, prepared, and packaged foods. Fast food and restaurant meals also are very high in sodium. Your doctor will probably limit your sodium to less than 2,000 milligrams (mg) a day. This limit counts all the sodium in prepared and packaged foods and any salt you add to your food. Follow-up care is a key part of your treatment and safety. Be sure to make and go to all appointments, and call your doctor if you are having problems. It's also a good idea to know your test results and keep a list of the medicines you take. How can you care for yourself at home? Read food labels  · Read labels on cans and food packages. The labels tell you how much sodium is in each serving. Make sure that you look at the serving size.  If you eat more than the serving size, you have eaten more sodium. · Food labels also tell you the Percent Daily Value for sodium. Choose products with low Percent Daily Values for sodium. · Be aware that sodium can come in forms other than salt, including monosodium glutamate (MSG), sodium citrate, and sodium bicarbonate (baking soda). MSG is often added to Asian food. When you eat out, you can sometimes ask for food without MSG or added salt. Buy low-sodium foods  · Buy foods that are labeled \"unsalted\" (no salt added), \"sodium-free\" (less than 5 mg of sodium per serving), or \"low-sodium\" (140 mg or less of sodium per serving). Foods labeled \"reduced-sodium\" and \"light sodium\" may still have too much sodium. Be sure to read the label to see how much sodium you are getting. · Buy fresh vegetables, or frozen vegetables without added sauces. Buy low-sodium versions of canned vegetables, soups, and other canned goods. Prepare low-sodium meals  · Cut back on the amount of salt you use in cooking. This will help you adjust to the taste. Do not add salt after cooking. One teaspoon of salt has about 2,300 mg of sodium. · Take the salt shaker off the table. · Flavor your food with garlic, lemon juice, onion, vinegar, herbs, and spices. Do not use soy sauce, lite soy sauce, steak sauce, onion salt, garlic salt, celery salt, or ketchup on your food. · Use low-sodium salad dressings, sauces, and ketchup. Or make your own salad dressings and sauces without adding salt. · Use less salt (or none) when recipes call for it. You can often use half the salt a recipe calls for without losing flavor. Other foods such as rice, pasta, and grains do not need added salt. · Rinse canned vegetables, and cook them in fresh water. This removes somebut not allof the salt. · Avoid water that is naturally high in sodium or that has been treated with water softeners, which add sodium. If you buy bottled water, read the label and choose a sodium-free brand.   Avoid high-sodium foods  · Avoid eating:  ? Smoked, cured, salted, and canned meat, fish, and poultry. ? Ham, , hot dogs, and luncheon meats. ? Regular, hard, and processed cheese and regular peanut butter. ? Crackers with salted tops, and other salted snack foods such as pretzels, chips, and salted popcorn. ? Frozen prepared meals, unless labeled low-sodium. ? Canned and dried soups, broths, and bouillon, unless labeled sodium-free or low-sodium. ? Canned vegetables, unless labeled sodium-free or low-sodium. ? [place] fries, pizza, tacos, and other fast foods. ? Pickles, olives, ketchup, and other condiments, especially soy sauce, unless labeled sodium-free or low-sodium. Where can you learn more? Go to http://www.gray.com/  Enter V843 in the search box to learn more about \"Low Sodium Diet (2,000 Milligram): Care Instructions. \"  Current as of: September 8, 2021               Content Version: 13.2  © 3635-5569 [employer]. Care instructions adapted under license by [employer] (which disclaims liability or warranty for this information). If you have questions about a medical condition or this instruction, always ask your healthcare professional. [place] [address] any warranty or liability for your use of this information.

## 2022-04-01 ENCOUNTER — VIRTUAL VISIT (OUTPATIENT)
Dept: FAMILY MEDICINE CLINIC | Age: 32
End: 2022-04-01
Payer: MEDICAID

## 2022-04-01 VITALS — DIASTOLIC BLOOD PRESSURE: 90 MMHG | SYSTOLIC BLOOD PRESSURE: 130 MMHG

## 2022-04-01 DIAGNOSIS — Z86.73 HISTORY OF CVA (CEREBROVASCULAR ACCIDENT): ICD-10-CM

## 2022-04-01 DIAGNOSIS — R60.9 EDEMA, UNSPECIFIED TYPE: ICD-10-CM

## 2022-04-01 DIAGNOSIS — I10 HYPERTENSION, UNSPECIFIED TYPE: Primary | ICD-10-CM

## 2022-04-01 PROCEDURE — 99214 OFFICE O/P EST MOD 30 MIN: CPT | Performed by: NURSE PRACTITIONER

## 2022-04-01 RX ORDER — AMLODIPINE BESYLATE 5 MG/1
5 TABLET ORAL DAILY
Qty: 30 TABLET | Refills: 0 | Status: SHIPPED | OUTPATIENT
Start: 2022-04-01 | End: 2022-05-11

## 2022-04-01 NOTE — PROGRESS NOTES
Chio Arrieta is a 32 y.o. female who was seen by synchronous (real-time) audio-video technology on 4/1/2022 for Hypertension and Labs        Assessment & Plan:   Diagnoses and all orders for this visit:    1. Hypertension, unspecified type  -     LIPID PANEL; Future  -     HEMOGLOBIN A1C WITH EAG; Future  -     amLODIPine (NORVASC) 5 mg tablet; Take 1 Tablet by mouth daily. 2. Edema, unspecified type  -     LIPID PANEL; Future  -     HEMOGLOBIN A1C WITH EAG; Future    3. History of CVA (cerebrovascular accident)  -     LIPID PANEL; Future  -     HEMOGLOBIN A1C WITH EAG; Future    Patient was seen by virtual video. Patient's mother was also present during encounter. This was for a follow-up blood pressure check. At last visit amlodipine has been added to her blood pressure medication list.  She reports it has come down some but will occasionally still have numbers diastolic near 310. Today she was 130/90. I have increased her dose to 5 mg of amlodipine. I have asked her to continue to watch diet. Limit salt. I have asked for a return visit in 2 weeks to check blood pressure. I have also requested labs. We will follow-up with results when they return. I spent at least 20 minutes on this visit with this established patient. Subjective:       Prior to Admission medications    Medication Sig Start Date End Date Taking? Authorizing Provider   amLODIPine (NORVASC) 5 mg tablet Take 1 Tablet by mouth daily. 4/1/22  Yes Niraj Ramirez NP   amLODIPine (NORVASC) 2.5 mg tablet Take 1 Tablet by mouth daily. 3/23/22 4/1/22  Niraj Ramirez NP   metoprolol tartrate (LOPRESSOR) 25 mg tablet Take 1 Tablet by mouth two (2) times a day. 3/4/22   Taniya Hoskins NP   cyclobenzaprine (FLEXERIL) 10 mg tablet Take 1 Tablet by mouth three (3) times daily as needed for Muscle Spasm(s). 3/4/22   Wade Pineda NP   L-Norgest&E Estradiol-E Estrad 0.15 mg-30 mcg (84)/10 mcg (7) 3MPk Take 1 Tablet by mouth daily. 2/7/22   Provider, Historical   dicyclomine (BENTYL) 20 mg tablet TAKE 1 TAB BY MOUTH EVERY SIX (6) HOURS AS NEEDED FOR ABDOMINAL CRAMPS. 1/18/22   Dennis Hoskins NP   pantoprazole (PROTONIX) 40 mg tablet Take 1 Tablet by mouth daily. 9/20/21   Dennis Hoskins NP   folic acid (FOLVITE) 1 mg tablet Take 1 Tablet by mouth two (2) times a day. 9/2/21   Serene Bloch, NP   Aimovig Autoinjector 140 mg/mL injection INJECT 1 SYRINGE ONCE MONTHLY 12/30/20   Provider, Historical   ondansetron hcl (Zofran) 4 mg tablet Take 4 mg by mouth every eight (8) hours as needed for Nausea or Vomiting. Provider, Historical   albuterol (PROVENTIL HFA, VENTOLIN HFA, PROAIR HFA) 90 mcg/actuation inhaler Take 1 Puff by inhalation every four (4) hours as needed for Wheezing. Please fill whichever albuterol 'brand' covered by pt's formulary 7/28/20   Robert Gupta MD   eletriptan (Relpax) 40 mg tablet Take 40 mg by mouth once as needed. may repeat in 2 hours if necessary    Provider, Historical   potassium citrate (UROCIT-K5) 5 mEq (540 mg) TbER tablet Take 5 mEq by mouth daily. 10/12/18   Provider, Historical   sodium bicarbonate 650 mg tablet TAKE 1 TABLET BY MOUTH AFTER MEALS AS DIRECTED 10/11/18   Provider, Historical   fexofenadine (ALLEGRA) 180 mg tablet Take  by mouth. Provider, Historical     Patient Active Problem List   Diagnosis Code    Body mass index 25.0-25.9, adult Z68.25    Dizziness R42    Dyspnea R06.00    GERD (gastroesophageal reflux disease) K21.9     Patient Active Problem List    Diagnosis Date Noted    Body mass index 25.0-25.9, adult 08/23/2018    Dizziness 08/23/2018    Dyspnea 08/23/2018    GERD (gastroesophageal reflux disease) 08/23/2018     Current Outpatient Medications   Medication Sig Dispense Refill    amLODIPine (NORVASC) 5 mg tablet Take 1 Tablet by mouth daily. 30 Tablet 0    metoprolol tartrate (LOPRESSOR) 25 mg tablet Take 1 Tablet by mouth two (2) times a day.  [address] [place] [address] Tablet 0    cyclobenzaprine (FLEXERIL) 10 mg tablet Take 1 Tablet by mouth three (3) times daily as needed for Muscle Spasm(s). 30 Tablet 2    L-Norgest&E Estradiol-E Estrad 0.15 mg-30 mcg (84)/10 mcg (7) 3MPk Take 1 Tablet by mouth daily.  dicyclomine (BENTYL) 20 mg tablet TAKE 1 TAB BY MOUTH EVERY SIX (6) HOURS AS NEEDED FOR ABDOMINAL CRAMPS. 180 Tablet 0    pantoprazole (PROTONIX) 40 mg tablet Take 1 Tablet by mouth daily. 856 Tablet 0    folic acid (FOLVITE) 1 mg tablet Take 1 Tablet by mouth two (2) times a day. 180 Tablet 1    Aimovig Autoinjector 140 mg/mL injection INJECT 1 SYRINGE ONCE MONTHLY      ondansetron hcl (Zofran) 4 mg tablet Take 4 mg by mouth every eight (8) hours as needed for Nausea or Vomiting.  albuterol (PROVENTIL HFA, VENTOLIN HFA, PROAIR HFA) 90 mcg/actuation inhaler Take 1 Puff by inhalation every four (4) hours as needed for Wheezing. Please fill whichever albuterol 'brand' covered by pt's formulary 1 Inhaler 3    eletriptan (Relpax) 40 mg tablet Take 40 mg by mouth once as needed. may repeat in 2 hours if necessary      potassium citrate (UROCIT-K5) 5 mEq (540 mg) TbER tablet Take 5 mEq by mouth daily. 4    sodium bicarbonate 650 mg tablet TAKE 1 TABLET BY MOUTH AFTER MEALS AS DIRECTED  1    fexofenadine (ALLEGRA) 180 mg tablet Take  by mouth.        Allergies   Allergen Reactions    Ajovy Autoinjector [Fremanezumab-Vfrm] Shortness of Breath, Cough and Vertigo    Ibuprofen Cold [Pseudoephedrine-Ibuprofen] Other (comments)    Lodine [Etodolac] Other (comments)     Headache      Naprosyn [Naproxen] Other (comments)    Nsaids (Non-Steroidal Anti-Inflammatory Drug) Nausea and Vomiting    Topamax [Topiramate] Other (comments)     Abdominal pain, Lactic Acidosis     Past Medical History:   Diagnosis Date    Cerebrovascular accident (CVA) ([address]     C[facility]brovascular accident [facility]CVA) [facility] [address]      in In utero; 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 HENT: Negative for congestion, sinus pain and sore throat. Eyes: Negative. Respiratory: Negative for cough and shortness of breath. Cardiovascular: Negative for chest pain. Gastrointestinal: Negative for diarrhea, nausea and vomiting. Genitourinary: Negative for dysuria. Musculoskeletal: Negative. Skin: Negative. Neurological: Negative for dizziness and headaches. Endo/Heme/Allergies: Negative for environmental allergies. Psychiatric/Behavioral: Negative.         Objective:     Patient-Reported Vitals 3/2/2021   Patient-Reported Weight -   Patient-Reported Height -   Patient-Reported Pulse -   Patient-Reported Systolic  738   Patient-Reported Diastolic 86        [INSTRUCTIONS:  \"[x]\" Indicates a positive item  \"[]\" Indicates a negative item  -- DELETE ALL ITEMS NOT EXAMINED]    Constitutional: [x] Appears well-developed and well-nourished [x] No apparent distress      [] Abnormal -     Mental status: [x] Alert and awake  [x] Oriented to person/place/time [x] Able to follow commands    [] Abnormal -     Eyes:   EOM    [x]  Normal    [] Abnormal -   Sclera  [x]  Normal    [] Abnormal -          Discharge [x]  None visible   [] Abnormal -     HENT: [x] Normocephalic, atraumatic  [] Abnormal -   [x] Mouth/Throat: Mucous membranes are moist    External Ears [x] Normal  [] Abnormal -    Neck: [x] No visualized mass [] Abnormal -     Pulmonary/Chest: [x] Respiratory effort normal   [x] No visualized signs of difficulty breathing or respiratory distress        [] Abnormal -      Musculoskeletal:   [x] Normal gait with no signs of ataxia         [x] Normal range of motion of neck        [] Abnormal -     Neurological:        [x] No Facial Asymmetry (Cranial nerve 7 motor function) (limited exam due to video visit)          [x] No gaze palsy        [] Abnormal -          Skin:        [x] No significant exanthematous lesions or discoloration noted on facial skin         [] Abnormal -            Psychiatric:       [x] Normal Affect [] Abnormal -        [x] No Hallucinations    Other pertinent observable physical exam findings:-        We discussed the expected course, resolution and complications of the diagnosis(es) in detail. Medication risks, benefits, costs, interactions, and alternatives were discussed as indicated. I advised her to contact the office if her condition worsens, changes or fails to improve as anticipated. She expressed understanding with the diagnosis(es) and plan. Saúl Wilkerson, was evaluated through a synchronous (real-time) audio-video encounter. The patient (or guardian if applicable) is aware that this is a billable service, which includes applicable co-pays. Verbal consent to proceed has been obtained. The visit was conducted pursuant to the emergency declaration under the [address] [place] [address] authority and the [employer] Act. Patient identification was verified, and a caregiver was present when appropriate. The patient was located at home in a state where the provider was licensed to provide care.       Cirilo Chong NP

## 2022-04-13 LAB
ALBUMIN SERPL-MCNC: 4.8 G/DL (ref 3.8–4.8)
ALBUMIN/CREAT UR: 9 MG/G CREAT (ref 0–29)
ALBUMIN/GLOB SERPL: 2.2 {RATIO} (ref 1.2–2.2)
ALP SERPL-CCNC: 50 IU/L (ref 44–121)
ALT SERPL-CCNC: 11 IU/L (ref 0–32)
AST SERPL-CCNC: 16 IU/L (ref 0–40)
BILIRUB SERPL-MCNC: 0.2 MG/DL (ref 0–1.2)
BUN SERPL-MCNC: 8 MG/DL (ref 6–20)
BUN/CREAT SERPL: 12 (ref 9–23)
CALCIUM SERPL-MCNC: 9.5 MG/DL (ref 8.7–10.2)
CHLORIDE SERPL-SCNC: 105 MMOL/L (ref 96–106)
CHOLEST SERPL-MCNC: 179 MG/DL (ref 100–199)
CO2 SERPL-SCNC: 16 MMOL/L (ref 20–29)
CREAT SERPL-MCNC: 0.68 MG/DL (ref 0.57–1)
CREAT UR-MCNC: 71.2 MG/DL
EGFR: 119 ML/MIN/1.73
EST. AVERAGE GLUCOSE BLD GHB EST-MCNC: 88 MG/DL
GLOBULIN SER CALC-MCNC: 2.2 G/DL (ref 1.5–4.5)
GLUCOSE SERPL-MCNC: 81 MG/DL (ref 65–99)
HBA1C MFR BLD: 4.7 % (ref 4.8–5.6)
HDLC SERPL-MCNC: 28 MG/DL
LDLC SERPL CALC-MCNC: 108 MG/DL (ref 0–99)
MICROALBUMIN UR-MCNC: 6.4 UG/ML
POTASSIUM SERPL-SCNC: 4.9 MMOL/L (ref 3.5–5.2)
PROT SERPL-MCNC: 7 G/DL (ref 6–8.5)
SODIUM SERPL-SCNC: 142 MMOL/L (ref 134–144)
TRIGL SERPL-MCNC: 246 MG/DL (ref 0–149)
VLDLC SERPL CALC-MCNC: 43 MG/DL (ref 5–40)

## 2022-04-15 DIAGNOSIS — R10.9 ABDOMINAL CRAMPS: ICD-10-CM

## 2022-04-15 DIAGNOSIS — K21.9 GASTROESOPHAGEAL REFLUX DISEASE, UNSPECIFIED WHETHER ESOPHAGITIS PRESENT: ICD-10-CM

## 2022-04-15 RX ORDER — PANTOPRAZOLE SODIUM 40 MG/1
TABLET, DELAYED RELEASE ORAL
Qty: 90 TABLET | Refills: 1 | Status: SHIPPED | OUTPATIENT
Start: 2022-04-15 | End: 2022-10-18

## 2022-05-10 DIAGNOSIS — I10 HYPERTENSION, UNSPECIFIED TYPE: ICD-10-CM

## 2022-05-11 RX ORDER — AMLODIPINE BESYLATE 5 MG/1
TABLET ORAL
Qty: 30 TABLET | Refills: 0 | Status: SHIPPED | OUTPATIENT
Start: 2022-05-11 | End: 2022-06-15 | Stop reason: SDUPTHER

## 2022-05-12 DIAGNOSIS — M62.838 MUSCLE SPASM: ICD-10-CM

## 2022-05-12 DIAGNOSIS — R52 PAIN: ICD-10-CM

## 2022-05-13 RX ORDER — CYCLOBENZAPRINE HCL 10 MG
TABLET ORAL
Qty: 30 TABLET | Refills: 2 | Status: SHIPPED | OUTPATIENT
Start: 2022-05-13 | End: 2022-06-28

## 2022-06-08 ENCOUNTER — VIRTUAL VISIT (OUTPATIENT)
Dept: FAMILY MEDICINE CLINIC | Age: 32
End: 2022-06-08
Payer: MEDICAID

## 2022-06-08 DIAGNOSIS — J01.10 ACUTE NON-RECURRENT FRONTAL SINUSITIS: Primary | ICD-10-CM

## 2022-06-08 PROCEDURE — 99213 OFFICE O/P EST LOW 20 MIN: CPT | Performed by: NURSE PRACTITIONER

## 2022-06-08 RX ORDER — AMOXICILLIN AND CLAVULANATE POTASSIUM 875; 125 MG/1; MG/1
1 TABLET, FILM COATED ORAL EVERY 12 HOURS
Qty: 20 TABLET | Refills: 0 | Status: SHIPPED | OUTPATIENT
Start: 2022-06-08 | End: 2022-06-18

## 2022-06-08 NOTE — PROGRESS NOTES
Brandan Martinez is a 32 y.o. female who was seen by synchronous (real-time) audio-video technology on 6/8/2022 for Cough and Sinus Infection        Assessment & Plan:   Diagnoses and all orders for this visit:    1. Acute non-recurrent frontal sinusitis  -     amoxicillin-clavulanate (AUGMENTIN) 875-125 mg per tablet; Take 1 Tablet by mouth every twelve (12) hours for 10 days. Indications: acute bacterial infection of the sinuses    Patient was seen by virtual video. Patient complaining of sinus type pain and congestion for 5 days. She tried over-the-counter medication and sinus rinse with no relief. I recommended Mucinex and Flonase daily. She does take Allegra daily. Will prescribe amoxicillin for sinusitis. I have asked her to follow-up in office if symptoms continue or worsen. I spent at least 20 minutes on this visit with this established patient. Subjective:       Prior to Admission medications    Medication Sig Start Date End Date Taking? Authorizing Provider   amoxicillin-clavulanate (AUGMENTIN) 875-125 mg per tablet Take 1 Tablet by mouth every twelve (12) hours for 10 days. Indications: acute bacterial infection of the sinuses 6/8/22 6/18/22 Yes Annika Wheatley NP   cyclobenzaprine (FLEXERIL) 10 mg tablet TAKE 1 TABLET BY MOUTH THREE TIMES A DAY AS NEEDED FOR MUSCLE SPASMS 5/13/22   Miguel A Hoskins NP   amLODIPine (NORVASC) 5 mg tablet TAKE 1 TABLET BY MOUTH EVERY DAY 5/11/22   Annika Wheatley NP   pantoprazole (PROTONIX) 40 mg tablet TAKE 1 TABLET BY MOUTH EVERY DAY 4/15/22   Asif Hoskins NP   metoprolol tartrate (LOPRESSOR) 25 mg tablet Take 1 Tablet by mouth two (2) times a day. 3/4/22   Glenn Powell NP   L-Norgest&E Estradiol-E Estrad 0.15 mg-30 mcg (84)/10 mcg (7) 3MPk Take 1 Tablet by mouth daily. 2/7/22   Provider, Historical   dicyclomine (BENTYL) 20 mg tablet TAKE 1 TAB BY MOUTH EVERY SIX (6) HOURS AS NEEDED FOR ABDOMINAL CRAMPS.  1/18/22   Glenn Powell NP   folic acid (FOLVITE) 1 mg tablet Take 1 Tablet by mouth two (2) times a day. 9/2/21   Wade Pineda NP   Aimovig Autoinjector 140 mg/mL injection INJECT 1 SYRINGE ONCE MONTHLY 12/30/20   Provider, Historical   ondansetron hcl (Zofran) 4 mg tablet Take 4 mg by mouth every eight (8) hours as needed for Nausea or Vomiting. Provider, Historical   albuterol (PROVENTIL HFA, VENTOLIN HFA, PROAIR HFA) 90 mcg/actuation inhaler Take 1 Puff by inhalation every four (4) hours as needed for Wheezing. Please fill whichever albuterol 'brand' covered by pt's formulary 7/28/20   Tamara Gupta MD   eletriptan (Relpax) 40 mg tablet Take 40 mg by mouth once as needed. may repeat in 2 hours if necessary    Provider, Historical   potassium citrate (UROCIT-K5) 5 mEq (540 mg) TbER tablet Take 5 mEq by mouth daily. 10/12/18   Provider, Historical   sodium bicarbonate 650 mg tablet TAKE 1 TABLET BY MOUTH AFTER MEALS AS DIRECTED 10/11/18   Provider, Historical   fexofenadine (ALLEGRA) 180 mg tablet Take  by mouth. Provider, Historical     Patient Active Problem List   Diagnosis Code    Body mass index 25.0-25.9, adult Z68.25    Dizziness R42    Dyspnea R06.00    GERD (gastroesophageal reflux disease) K21.9     Patient Active Problem List    Diagnosis Date Noted    Body mass index 25.0-25.9, adult 08/23/2018    Dizziness 08/23/2018    Dyspnea 08/23/2018    GERD (gastroesophageal reflux disease) 08/23/2018     Current Outpatient Medications   Medication Sig Dispense Refill    amoxicillin-clavulanate (AUGMENTIN) 875-125 mg per tablet Take 1 Tablet by mouth every twelve (12) hours for 10 days.  Indications: acute bacterial infection of the sinuses 20 Tablet 0    cyclobenzaprine (FLEXERIL) 10 mg tablet TAKE 1 TABLET BY MOUTH THREE TIMES A DAY AS NEEDED FOR MUSCLE SPASMS 30 Tablet 2    amLODIPine (NORVASC) 5 mg tablet TAKE 1 TABLET BY MOUTH EVERY DAY 30 Tablet 0    pantoprazole (PROTONIX) 40 mg tablet TAKE 1 TABLET BY MOUTH EVERY DAY 90 Tablet 1    metoprolol tartrate (LOPRESSOR) 25 mg tablet Take 1 Tablet by mouth two (2) times a day. 180 Tablet 0    L-Norgest&E Estradiol-E Estrad 0.15 mg-30 mcg (84)/10 mcg (7) 3MPk Take 1 Tablet by mouth daily.  dicyclomine (BENTYL) 20 mg tablet TAKE 1 TAB BY MOUTH EVERY SIX (6) HOURS AS NEEDED FOR ABDOMINAL CRAMPS. 925 Tablet 0    folic acid (FOLVITE) 1 mg tablet Take 1 Tablet by mouth two (2) times a day. 180 Tablet 1    Aimovig Autoinjector 140 mg/mL injection INJECT 1 SYRINGE ONCE MONTHLY      ondansetron hcl (Zofran) 4 mg tablet Take 4 mg by mouth every eight (8) hours as needed for Nausea or Vomiting.  albuterol (PROVENTIL HFA, VENTOLIN HFA, PROAIR HFA) 90 mcg/actuation inhaler Take 1 Puff by inhalation every four (4) hours as needed for Wheezing. Please fill whichever albuterol 'brand' covered by pt's formulary 1 Inhaler 3    eletriptan (Relpax) 40 mg tablet Take 40 mg by mouth once as needed. may repeat in 2 hours if necessary      potassium citrate (UROCIT-K5) 5 mEq (540 mg) TbER tablet Take 5 mEq by mouth daily. 4    sodium bicarbonate 650 mg tablet TAKE 1 TABLET BY MOUTH AFTER MEALS AS DIRECTED  1    fexofenadine (ALLEGRA) 180 mg tablet Take  by mouth.        Allergies   Allergen Reactions    Ajovy Autoinjector [Fremanezumab-Vfrm] Shortness of Breath, Cough and Vertigo    Ibuprofen Cold [Pseudoephedrine-Ibuprofen] Other (comments)    Lodine [Etodolac] Other (comments)     Headache      Naprosyn [Naproxen] Other (comments)    Nsaids (Non-Steroidal Anti-Inflammatory Drug) Nausea and Vomiting    Topamax [Topiramate] Other (comments)     Abdominal pain, Lactic Acidosis     Past Medical History:   Diagnosis Date    [facility] [address]     Cerebrovascular accident (CVA) ([facility] [address]      in In utero; 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 HENT: Positive for congestion, sinus pain and sore throat. Eyes: Negative. Respiratory: Negative for cough and shortness of breath. Cardiovascular: Negative for chest pain. Gastrointestinal: Negative for diarrhea, nausea and vomiting. Genitourinary: Negative for dysuria. Musculoskeletal: Negative. Skin: Negative. Neurological: Negative for dizziness and headaches. Endo/Heme/Allergies: Negative for environmental allergies. Psychiatric/Behavioral: Negative.         Objective:     Patient-Reported Vitals 3/2/2021   Patient-Reported Weight -   Patient-Reported Height -   Patient-Reported Pulse -   Patient-Reported Systolic  262   Patient-Reported Diastolic 86        [INSTRUCTIONS:  \"[x]\" Indicates a positive item  \"[]\" Indicates a negative item  -- DELETE ALL ITEMS NOT EXAMINED]    Constitutional: [x] Appears well-developed and well-nourished [x] No apparent distress      [] Abnormal -     Mental status: [x] Alert and awake  [x] Oriented to person/place/time [x] Able to follow commands    [] Abnormal -     Eyes:   EOM    [x]  Normal    [] Abnormal -   Sclera  [x]  Normal    [] Abnormal -          Discharge [x]  None visible   [] Abnormal -     HENT: [x] Normocephalic, atraumatic  [] Abnormal -   [x] Mouth/Throat: Mucous membranes are moist    External Ears [x] Normal  [] Abnormal -    Neck: [x] No visualized mass [] Abnormal -     Pulmonary/Chest: [x] Respiratory effort normal   [x] No visualized signs of difficulty breathing or respiratory distress        [] Abnormal -      Musculoskeletal:   [x] Normal gait with no signs of ataxia         [x] Normal range of motion of neck        [] Abnormal -     Neurological:        [x] No Facial Asymmetry (Cranial nerve 7 motor function) (limited exam due to video visit)          [x] No gaze palsy        [] Abnormal - Skin:        [x] No significant exanthematous lesions or discoloration noted on facial skin         [] Abnormal -            Psychiatric:       [x] Normal Affect [] Abnormal -        [x] No Hallucinations    Other pertinent observable physical exam findings:-        We discussed the expected course, resolution and complications of the diagnosis(es) in detail. Medication risks, benefits, costs, interactions, and alternatives were discussed as indicated. I advised her to contact the office if her condition worsens, changes or fails to improve as anticipated. She expressed understanding with the diagnosis(es) and plan. Олег Smith, was evaluated through a synchronous (real-time) audio-video encounter. The patient (or guardian if applicable) is aware that this is a billable service, which includes applicable co-pays. This Virtual Visit was conducted with patient's (and/or legal guardian's) consent. The visit was conducted pursuant to the emergency declaration under the [address] [place] [address], [address] [place] [address] authority and the [employer] General Act. Patient identification was verified, and a caregiver was present when appropriate. The patient was located at: Home: [place] [facility] [id number]  The provider was located at:  Facility (Appt Department): [address],

## 2022-06-13 ENCOUNTER — TELEPHONE (OUTPATIENT)
Dept: FAMILY MEDICINE CLINIC | Age: 32
End: 2022-06-13

## 2022-06-14 NOTE — TELEPHONE ENCOUNTER
Patient's mother called for patient stating she is currently being treated for sinusitis and has been experiencing nausea for the past several days likely due to the abx. She is staying hydrated and denies any vomiting at this time. She has a hx of renal tubular acidosis and has been hospitalized previously associated with vomiting. Since patient is not currently vomiting and only experiencing nausea advised her to stick to a bland diet and call office first thing in the AM to discuss with provider that prescribed the abx. ED and urgent care precautions reviewed.

## 2022-06-15 ENCOUNTER — VIRTUAL VISIT (OUTPATIENT)
Dept: FAMILY MEDICINE CLINIC | Age: 32
End: 2022-06-15
Payer: MEDICAID

## 2022-06-15 DIAGNOSIS — I10 HYPERTENSION, UNSPECIFIED TYPE: ICD-10-CM

## 2022-06-15 DIAGNOSIS — R11.0 NAUSEA: ICD-10-CM

## 2022-06-15 DIAGNOSIS — J01.10 ACUTE NON-RECURRENT FRONTAL SINUSITIS: Primary | ICD-10-CM

## 2022-06-15 PROCEDURE — 99214 OFFICE O/P EST MOD 30 MIN: CPT | Performed by: NURSE PRACTITIONER

## 2022-06-15 RX ORDER — AZITHROMYCIN 250 MG/1
TABLET, FILM COATED ORAL
Qty: 6 TABLET | Refills: 0 | Status: SHIPPED | OUTPATIENT
Start: 2022-06-15 | End: 2022-06-20

## 2022-06-15 RX ORDER — METOPROLOL TARTRATE 25 MG/1
25 TABLET, FILM COATED ORAL 2 TIMES DAILY
Qty: 180 TABLET | Refills: 1 | Status: SHIPPED | OUTPATIENT
Start: 2022-06-15 | End: 2022-06-15 | Stop reason: SDUPTHER

## 2022-06-15 RX ORDER — AMLODIPINE BESYLATE 5 MG/1
5 TABLET ORAL DAILY
Qty: 90 TABLET | Refills: 1 | Status: SHIPPED | OUTPATIENT
Start: 2022-06-15 | End: 2022-10-21

## 2022-06-15 RX ORDER — ONDANSETRON HYDROCHLORIDE 8 MG/1
8 TABLET, FILM COATED ORAL
Qty: 20 TABLET | Refills: 0 | Status: SHIPPED | OUTPATIENT
Start: 2022-06-15 | End: 2022-09-29

## 2022-06-15 NOTE — PROGRESS NOTES
Leigha Anderson is a 32 y.o. female who was seen by synchronous (real-time) audio-video technology on 6/15/2022 for Sinus Infection        Assessment & Plan:   Diagnoses and all orders for this visit:    1. Acute non-recurrent frontal sinusitis  -     azithromycin (ZITHROMAX) 250 mg tablet; Take 2 tablets today, then take 1 tablet daily    2. Hypertension, unspecified type  -     amLODIPine (NORVASC) 5 mg tablet; Take 1 Tablet by mouth daily. Indications: high blood pressure  -     metoprolol tartrate (LOPRESSOR) 25 mg tablet; Take 1 Tablet by mouth two (2) times a day. 3. Nausea  -     ondansetron hcl (ZOFRAN) 8 mg tablet; Take 1 Tablet by mouth every eight (8) hours as needed for Nausea or Vomiting. Patient was seen by virtual video. Patient was previously seen for sinusitis. Patient reports increased abdominal upset with Augmentin. Patient has been switched to azithromycin. She has some nausea so Zofran has been provided also. Recommended continued over-the-counter medication for symptom relief. Patient is in need of medication refill for blood pressure. Blood pressure still not totally under control. I have asked her to follow-up in 10 to 14 days for further evaluation. I spent at least 20 minutes on this visit with this established patient. Subjective:       Prior to Admission medications    Medication Sig Start Date End Date Taking? Authorizing Provider   azithromycin (ZITHROMAX) 250 mg tablet Take 2 tablets today, then take 1 tablet daily 6/15/22 6/20/22 Yes Ben Ahmadi NP   amLODIPine (NORVASC) 5 mg tablet Take 1 Tablet by mouth daily. Indications: high blood pressure 6/15/22  Yes Ben Ahmadi NP   metoprolol tartrate (LOPRESSOR) 25 mg tablet Take 1 Tablet by mouth two (2) times a day.  6/15/22  Yes Ben Ahmadi NP   ondansetron hcl [facility]) 8 mg tablet Take 1 Tablet by mouth every eight (8) hours as needed for Nausea or Vomiting. 6/15/22  Yes Ben Ahmadi NP amoxicillin-clavulanate (AUGMENTIN) 875-125 mg per tablet Take 1 Tablet by mouth every twelve (12) hours for 10 days. Indications: acute bacterial infection of the sinuses 6/8/22 6/18/22  Delio Garcia NP   cyclobenzaprine (FLEXERIL) 10 mg tablet TAKE 1 TABLET BY MOUTH THREE TIMES A DAY AS NEEDED FOR MUSCLE SPASMS 5/13/22   Sincere Hoskins NP   amLODIPine (NORVASC) 5 mg tablet TAKE 1 TABLET BY MOUTH EVERY DAY 5/11/22 6/15/22  Delio Garcia NP   pantoprazole (PROTONIX) 40 mg tablet TAKE 1 TABLET BY MOUTH EVERY DAY 4/15/22   John Hoskins NP   metoprolol tartrate (LOPRESSOR) 25 mg tablet Take 1 Tablet by mouth two (2) times a day. 3/4/22   Aime Fonseca NP   L-Norgest&E Estradiol-E Estrad 0.15 mg-30 mcg (84)/10 mcg (7) 3MPk Take 1 Tablet by mouth daily. 2/7/22   Provider, Historical   dicyclomine (BENTYL) 20 mg tablet TAKE 1 TAB BY MOUTH EVERY SIX (6) HOURS AS NEEDED FOR ABDOMINAL CRAMPS. 1/18/22   John Hoskins NP   folic acid (FOLVITE) 1 mg tablet Take 1 Tablet by mouth two (2) times a day. 9/2/21   Aime Fonseca NP   Aimovig Autoinjector 140 mg/mL injection INJECT 1 SYRINGE ONCE MONTHLY 12/30/20   Provider, Historical   ondansetron hcl (Zofran) 4 mg tablet Take 4 mg by mouth every eight (8) hours as needed for Nausea or Vomiting. Provider, Historical   albuterol (PROVENTIL HFA, VENTOLIN HFA, PROAIR HFA) 90 mcg/actuation inhaler Take 1 Puff by inhalation every four (4) hours as needed for Wheezing. Please fill whichever albuterol 'brand' covered by pt's formulary 7/28/20   Federico Gupta MD   eletriptan (Relpax) 40 mg tablet Take 40 mg by mouth once as needed. may repeat in 2 hours if necessary    Provider, Historical   potassium citrate (UROCIT-K5) 5 mEq (540 mg) TbER tablet Take 5 mEq by mouth daily.  10/12/18   Provider, Historical   sodium bicarbonate 650 mg tablet TAKE 1 TABLET BY MOUTH AFTER MEALS AS DIRECTED 10/11/18   Provider, Historical   fexofenadine (ALLEGRA) 180 mg tablet Take  by mouth. Provider, Historical     Patient Active Problem List   Diagnosis Code    Body mass index 25.0-25.9, adult Z68.25    Dizziness R42    Dyspnea R06.00    GERD (gastroesophageal reflux disease) K21.9     Patient Active Problem List    Diagnosis Date Noted    Body mass index 25.0-25.9, adult 08/23/2018    Dizziness 08/23/2018    Dyspnea 08/23/2018    GERD (gastroesophageal reflux disease) 08/23/2018     Current Outpatient Medications   Medication Sig Dispense Refill    azithromycin (ZITHROMAX) 250 mg tablet Take 2 tablets today, then take 1 tablet daily 6 Tablet 0    amLODIPine (NORVASC) 5 mg tablet Take 1 Tablet by mouth daily. Indications: high blood pressure 90 Tablet 1    metoprolol tartrate (LOPRESSOR) 25 mg tablet Take 1 Tablet by mouth two (2) times a day. 180 Tablet 1    ondansetron hcl (ZOFRAN) 8 mg tablet Take 1 Tablet by mouth every eight (8) hours as needed for Nausea or Vomiting. 20 Tablet 0    amoxicillin-clavulanate (AUGMENTIN) 875-125 mg per tablet Take 1 Tablet by mouth every twelve (12) hours for 10 days. Indications: acute bacterial infection of the sinuses 20 Tablet 0    cyclobenzaprine (FLEXERIL) 10 mg tablet TAKE 1 TABLET BY MOUTH THREE TIMES A DAY AS NEEDED FOR MUSCLE SPASMS 30 Tablet 2    pantoprazole (PROTONIX) 40 mg tablet TAKE 1 TABLET BY MOUTH EVERY DAY 90 Tablet 1    metoprolol tartrate (LOPRESSOR) 25 mg tablet Take 1 Tablet by mouth two (2) times a day. 180 Tablet 0    L-Norgest&E Estradiol-E Estrad 0.15 mg-30 mcg (84)/10 mcg (7) 3MPk Take 1 Tablet by mouth daily.  dicyclomine (BENTYL) 20 mg tablet TAKE 1 TAB BY MOUTH EVERY SIX (6) HOURS AS NEEDED FOR ABDOMINAL CRAMPS. 949 Tablet 0    folic acid (FOLVITE) 1 mg tablet Take 1 Tablet by mouth two (2) times a day.  180 Tablet 1    Aimovig Autoinjector 140 mg/mL injection INJECT 1 SYRINGE ONCE MONTHLY      ondansetron hcl (Zofran) 4 mg tablet Take 4 mg by mouth every eight (8) hours as needed for Nausea or Vomiting.  albuterol (PROVENTIL HFA, VENTOLIN HFA, PROAIR HFA) 90 mcg/actuation inhaler Take 1 Puff by inhalation every four (4) hours as needed for Wheezing. Please fill whichever albuterol 'brand' covered by pt's formulary 1 Inhaler 3    eletriptan (Relpax) 40 mg tablet Take 40 mg by mouth once as needed. may repeat in 2 hours if necessary      potassium citrate (UROCIT-K5) 5 mEq (540 mg) TbER tablet Take 5 mEq by mouth daily. 4    sodium bicarbonate 650 mg tablet TAKE 1 TABLET BY MOUTH AFTER MEALS AS DIRECTED  1    fexofenadine (ALLEGRA) 180 mg tablet Take  by mouth. 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 HENT: Positive for congestion, sinus pain and sore throat. Respiratory: Positive for cough. Negative for shortness of breath. Cardiovascular: Negative for chest pain. Gastrointestinal: Negative for diarrhea, nausea and vomiting. Genitourinary: Negative for dysuria. Musculoskeletal: Negative. Neurological: Negative for dizziness and headaches. Endo/Heme/Allergies: Negative for environmental allergies. Psychiatric/Behavioral: Negative.         Objective: Patient-Reported Vitals 3/2/2021   Patient-Reported Weight -   Patient-Reported Height -   Patient-Reported Pulse -   Patient-Reported Systolic  155   Patient-Reported Diastolic 86        [INSTRUCTIONS:  \"[x]\" Indicates a positive item  \"[]\" Indicates a negative item  -- DELETE ALL ITEMS NOT EXAMINED]    Constitutional: [x] Appears well-developed and well-nourished [x] No apparent distress      [] Abnormal -     Mental status: [x] Alert and awake  [x] Oriented to person/place/time [x] Able to follow commands    [] Abnormal -     Eyes:   EOM    [x]  Normal    [] Abnormal -   Sclera  [x]  Normal    [] Abnormal -          Discharge [x]  None visible   [] Abnormal -     HENT: [x] Normocephalic, atraumatic  [] Abnormal -   [x] Mouth/Throat: Mucous membranes are moist    External Ears [x] Normal  [] Abnormal -    Neck: [x] No visualized mass [] Abnormal -     Pulmonary/Chest: [x] Respiratory effort normal   [x] No visualized signs of difficulty breathing or respiratory distress        [] Abnormal -      Musculoskeletal:   [x] Normal gait with no signs of ataxia         [x] Normal range of motion of neck        [] Abnormal -     Neurological:        [x] No Facial Asymmetry (Cranial nerve 7 motor function) (limited exam due to video visit)          [x] No gaze palsy        [] Abnormal -          Skin:        [x] No significant exanthematous lesions or discoloration noted on facial skin         [] Abnormal -            Psychiatric:       [x] Normal Affect [] Abnormal -        [x] No Hallucinations    Other pertinent observable physical exam findings:-        We discussed the expected course, resolution and complications of the diagnosis(es) in detail. Medication risks, benefits, costs, interactions, and alternatives were discussed as indicated. I advised her to contact the office if her condition worsens, changes or fails to improve as anticipated. She expressed understanding with the diagnosis(es) and plan. Aliya Odell was evaluated through a synchronous (real-time) audio-video encounter. The patient (or guardian if applicable) is aware that this is a billable service, which includes applicable co-pays. This Virtual Visit was conducted with patient's (and/or legal guardian's) consent. The visit was conducted pursuant to the emergency declaration under the [address] [place] [address] authority and the [email or web address] General Act. Patient identification was verified, and a caregiver was present when appropriate. The patient was located at: Home: [place] [facility] [id number]  The provider was located at:  Facility (Appt Department): [address],

## 2022-06-28 DIAGNOSIS — R52 PAIN: ICD-10-CM

## 2022-06-28 DIAGNOSIS — M62.838 MUSCLE SPASM: ICD-10-CM

## 2022-06-28 RX ORDER — CYCLOBENZAPRINE HCL 10 MG
TABLET ORAL
Qty: 30 TABLET | Refills: 2 | Status: SHIPPED | OUTPATIENT
Start: 2022-06-28 | End: 2022-08-22

## 2022-06-30 ENCOUNTER — OFFICE VISIT (OUTPATIENT)
Dept: FAMILY MEDICINE CLINIC | Age: 32
End: 2022-06-30
Payer: MEDICAID

## 2022-06-30 VITALS
RESPIRATION RATE: 16 BRPM | WEIGHT: 141 LBS | SYSTOLIC BLOOD PRESSURE: 122 MMHG | HEIGHT: 61 IN | DIASTOLIC BLOOD PRESSURE: 87 MMHG | TEMPERATURE: 98.1 F | OXYGEN SATURATION: 96 % | HEART RATE: 113 BPM | BODY MASS INDEX: 26.62 KG/M2

## 2022-06-30 DIAGNOSIS — N25.89 RENAL TUBULAR ACIDOSIS: ICD-10-CM

## 2022-06-30 DIAGNOSIS — M62.838 MUSCLE SPASM: ICD-10-CM

## 2022-06-30 DIAGNOSIS — M79.10 MYALGIA: Primary | ICD-10-CM

## 2022-06-30 DIAGNOSIS — M25.511 ACUTE PAIN OF RIGHT SHOULDER: ICD-10-CM

## 2022-06-30 PROCEDURE — 99213 OFFICE O/P EST LOW 20 MIN: CPT | Performed by: NURSE PRACTITIONER

## 2022-06-30 RX ORDER — PREDNISONE 10 MG/1
TABLET ORAL
Qty: 21 TABLET | Refills: 0 | Status: SHIPPED | OUTPATIENT
Start: 2022-06-30 | End: 2022-10-10 | Stop reason: SDUPTHER

## 2022-06-30 NOTE — PATIENT INSTRUCTIONS
Muscle Cramps: Care Instructions  Your Care Instructions     A muscle cramp occurs when a muscle tightens up suddenly. A cramp often happens in the legs. A muscle cramp is also called a muscle spasm or a charley horse. Muscle cramps usually last less than a minute. However, the pain may last for several minutes. Leg cramps that occur at night may wake you up. Heavy exercise, dehydration, and being overweight can increase your risk of getting cramps. An imbalance of certain chemicals in your blood, called electrolytes, can also lead to muscle cramps. Pregnant women sometimes get muscle cramps during sleep. Muscle cramps can be treated by stretching and massaging the muscle. If cramps keep coming back, your doctor may prescribe medicine that relaxes your muscles. Follow-up care is a key part of your treatment and safety. Be sure to make and go to all appointments, and call your doctor if you are having problems. It's also a good idea to know your test results and keep a list of the medicines you take. How can you care for yourself at home? · Drink plenty of fluids to prevent dehydration. Choose water and other clear liquids until you feel better. If you have kidney, heart, or liver disease and have to limit fluids, talk with your doctor before you increase the amount of fluids you drink. · Stretch your muscles every day, especially before and after exercise and at bedtime. Regular stretching can relax your muscles and may prevent cramps. · Do not suddenly increase the amount of exercise you get. Increase your exercise a little each week. · When you get a cramp, stretch and massage the muscle. You can also take a warm shower or bath to relax the muscle. A heating pad placed on the muscle can also help. · Take a daily multivitamin supplement. · Ask your doctor if you can take an over-the-counter pain medicine, such as acetaminophen (Tylenol), ibuprofen (Advil, Motrin), or naproxen (Aleve).  Be safe with medicines. Read and follow all instructions on the label. When should you call for help? Watch closely for changes in your health, and be sure to contact your doctor if:    · You get muscle cramps often that do not go away after home treatment.     · Your muscle cramps often wake you up at night.     · You do not get better as expected. Where can you learn more? Go to http://www.charles.com/  Enter I565 in the search box to learn more about \"Muscle Cramps: Care Instructions. \"  Current as of: July 1, 2021               Content Version: 13.2  © 3742-3357 [employer]. Care instructions adapted under license by [employer] (which disclaims liability or warranty for this information). If you have questions about a medical condition or this instruction, always ask your healthcare professional. [address] any warranty or liability for your use of this information.

## 2022-06-30 NOTE — PROGRESS NOTES
Assessment/Plan:     Diagnoses and all orders for this visit:    1. 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 Muscle spasm   -takes cyclobenzaprine as directed, side effects discussed    4. Renal tubular acidosis    5. Acute pain of right shoulder  -     predniSONE (STERAPRED DS) 10 mg dose pack; See administration instruction per 10mg dose pack  - Worsening, rest the arm from repetitive movements as discussed at visit, follow up if symptoms persist        Follow-up and Dispositions    · Return for PRN. Discussed expected course/resolution/complications of diagnosis in detail with patient. Medication risks/benefits/costs/interactions/alternatives discussed with patient. Pt was given after visit summary which includes diagnoses, current medications & vitals. Pt expressed understanding with the diagnosis and plan        Subjective:      Amina Myers is a 32 y.o. female who presents for had concerns including Generalized Body Aches (For x1-2 weeks; ). Here today for ongoing since 2021 for severe vomiting spell and in the hospital a month  This year was in the bed 3 weeks for migraines and  Had to start PT again    History of renal tubular acidosis so when she vomits has sodium bicorbonate all over body  Tried ativan that helped  Doing PT by self at home with given exercises  Finished PT the middle of may    Right arm pain and tingling  For the last 2 weeks right arm has been stabbing, sometime aching, sometimes rated 8/10  Hurts with overhead movements  Sitting still arm does not hurt  Fingers hurt when typing on phone  Denies injury to the right  Right hand dominent    Current Outpatient Medications   Medication Sig Dispense Refill    ascorbic acid (VITAMIN C PO) Take  by mouth.       predniSONE (STERAPRED DS) 10 mg dose pack See administration instruction per 10mg dose pack 21 Tablet 0    cyclobenzaprine (FLEXERIL) 10 mg tablet TAKE 1 TABLET BY MOUTH THREE TIMES A DAY AS NEEDED FOR MUSCLE SPASMS 30 Tablet 2    metoprolol tartrate (LOPRESSOR) 25 mg tablet TAKE 1 TABLET BY MOUTH TWO TIMES A DAY. 180 Tablet 1    amLODIPine (NORVASC) 5 mg tablet Take 1 Tablet by mouth daily. Indications: high blood pressure 90 Tablet 1    ondansetron hcl (ZOFRAN) 8 mg tablet Take 1 Tablet by mouth every eight (8) hours as needed for Nausea or Vomiting. 20 Tablet 0    pantoprazole (PROTONIX) 40 mg tablet TAKE 1 TABLET BY MOUTH EVERY DAY 90 Tablet 1    L-Norgest&E Estradiol-E Estrad 0.15 mg-30 mcg (84)/10 mcg (7) 3MPk Take 1 Tablet by mouth daily.  dicyclomine (BENTYL) 20 mg tablet TAKE 1 TAB BY MOUTH EVERY SIX (6) HOURS AS NEEDED FOR ABDOMINAL CRAMPS. 226 Tablet 0    folic acid (FOLVITE) 1 mg tablet Take 1 Tablet by mouth two (2) times a day. 180 Tablet 1    Aimovig Autoinjector 140 mg/mL injection INJECT 1 SYRINGE ONCE MONTHLY      ondansetron hcl (Zofran) 4 mg tablet Take 4 mg by mouth every eight (8) hours as needed for Nausea or Vomiting.  albuterol (PROVENTIL HFA, VENTOLIN HFA, PROAIR HFA) 90 mcg/actuation inhaler Take 1 Puff by inhalation every four (4) hours as needed for Wheezing. Please fill whichever albuterol 'brand' covered by pt's formulary 1 Inhaler 3    eletriptan (Relpax) 40 mg tablet Take 40 mg by mouth once as needed. may repeat in 2 hours if necessary      potassium citrate (UROCIT-K5) 5 mEq (540 mg) TbER tablet Take 5 mEq by mouth daily. 4    sodium bicarbonate 650 mg tablet TAKE 1 TABLET BY MOUTH AFTER MEALS AS DIRECTED  1    fexofenadine (ALLEGRA) 180 mg tablet Take  by mouth.          Allergies   Allergen Reactions    Ajovy Autoinjector [Fremanezumab-Vfrm] Shortness of Breath, Cough and Vertigo    Ibuprofen Cold [Pseudoephedrine-Ibuprofen] Other (comments)    Lodine [Etodolac] Other (comments) Headache      Naprosyn [Naproxen] Other (comments)    Nsaids (Non-Steroidal Anti-Inflammatory Drug) Nausea and Vomiting    Topamax [Topiramate] Other (comments)     Abdominal pain, Lactic Acidosis     Past Medical History:   Diagnosis Date    [facility] [address]     [facility]      in In utero; 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; right side of brain; 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;    NSAID induced gastritis     Peptic ulcer     Peptic ulcer disease      Past Surgical History:   Procedure Laterality Date    HX CHOLECYSTECTOMY      HX FEMUR FRACTURE [place]       Family History   Problem Relation Age of Onset    Hypertension Mother      Social History     Socioeconomic History    Marital status: SINGLE     Spouse name: Not on file    Number of children: Not on file    Years of education: Not on file    Highest education level: Not on file   Occupational History    Not on file   Tobacco Use    Smoking status: Never Smoker    Smokeless tobacco: Never Used   Vaping Use    Vaping Use: Never used   Substance and Sexual Activity    Alcohol use: No    Drug use: No    Sexual activity: Not on file   Other Topics Concern    Not on file   Social History Narrative    ** Merged History Encounter **          Social Determinants of Health     Financial Resource Strain:     Difficulty of Paying Living Expenses: Not on file   Food Insecurity:     Worried About Running Out of Food in the Last Year: Not on file    Lore of Food in the Last Year: Not on file   Transportation Needs:     Lack of Transportation (Medical): Not on file    Lack of Transportation (Non-Medical):  Not on file   Physical Activity:     Days of Exercise per Week: Not on file    Minutes of Exercise per Session: Not on file   Stress:     Feeling of Stress : Not on file   Social Connections:     Frequency of Communication with Friends and Family: Not on file    Frequency of Social Gatherings with Friends and Family: Not on file    Attends [employer] Services: Not on file    Active Member of Clubs or Organizations: Not on file    Attends Club or Organization Meetings: Not on file    Marital Status: Not on file   Intimate Partner Violence:     Fear of Current or Ex-Partner: Not on file    Emotionally Abused: Not on file    Physically Abused: Not on file    Sexually Abused: Not on file   Housing Stability:     Unable to Pay for Housing in the Last Year: Not on file    Number of [place] in the Last Year: Not on file    Unstable Housing in the Last Year: Not on file       HPI      ROS:   Review of Systems   Constitutional: Negative for chills, fever and malaise/fatigue. Eyes: Negative for blurred vision. Respiratory: Negative for cough and shortness of breath. Cardiovascular: Negative for chest pain, palpitations and leg swelling. Musculoskeletal: Positive for joint pain and myalgias. Neurological: Negative for dizziness and headaches. Objective:     Visit Vitals  /87 (BP 1 Location: Left upper arm, BP Patient Position: Sitting, BP Cuff Size: Adult)   Pulse (!) 113   Temp 98.1 °F (36.7 °C) (Temporal)   Resp 16   Ht 5' 1\" (1.549 m)   Wt 141 lb (64 kg)   SpO2 96%   BMI 26.64 kg/m²         Vitals and Nurse Documentation reviewed. Physical Exam  Constitutional:       General: She is not in acute distress. Appearance: She is not diaphoretic. HENT:      Head: Normocephalic and atraumatic. Cardiovascular:      Rate and Rhythm: Normal rate and regular rhythm. Heart sounds: Normal heart sounds. No murmur heard. No friction rub. No gallop. Pulmonary:      Effort: Pulmonary effort is normal. No respiratory distress. Breath sounds: Normal breath sounds. No wheezing or rales. Musculoskeletal:      Right shoulder: No swelling, deformity, effusion or laceration. Normal range of motion. Normal strength.       Left shoulder: Normal range of motion. Decreased strength. Right hand: No tenderness. Normal range of motion. Normal capillary refill. Left hand: Decreased strength. Normal capillary refill. Skin:     General: Skin is warm and dry. Coloration: Skin is not pale. Neurological:      Mental Status: She is alert and oriented to person, place, and time. Psychiatric:         Mood and Affect: Affect normal. Mood is not anxious or depressed. Behavior: Behavior is not agitated. Thought Content: Thought content does not include homicidal or suicidal ideation.          Results for orders placed or performed in visit on 04/01/22   LIPID PANEL   Result Value Ref Range    Cholesterol, total 179 100 - 199 mg/dL    Triglyceride 246 (H) 0 - 149 mg/dL    HDL Cholesterol 28 (L) >39 mg/dL    VLDL, calculated 43 (H) 5 - 40 mg/dL    LDL, calculated 108 (H) 0 - 99 mg/dL   HEMOGLOBIN A1C WITH EAG   Result Value Ref Range    Hemoglobin A1c 4.7 (L) 4.8 - 5.6 %    Estimated average glucose 88 mg/dL

## 2022-06-30 NOTE — PROGRESS NOTES
Identified pt with two pt identifiers(name and ). Reviewed record in preparation for visit and have obtained necessary documentation. Chief Complaint   Patient presents with    Generalized Body Aches     For x1-2 weeks; Health Maintenance Due   Topic    Hepatitis C Screening     DTaP/Tdap/Td series (1 - Tdap)    Cervical cancer screen     COVID-19 Vaccine (3 - Booster for [place] series)       Coordination of Care Questionnaire:  :   1) Have you been to an emergency room, urgent care, or hospitalized since your last visit? If yes, where when, and reason for visit? no      2. Have seen or consulted any other health care provider since your last visit? If yes, where when, and reason for visit?  no        Patient is accompanied by self I have received verbal consent from Sondra Alicea to discuss any/all medical information while they are present in the room.

## 2022-08-20 DIAGNOSIS — M62.838 MUSCLE SPASM: ICD-10-CM

## 2022-08-20 DIAGNOSIS — R52 PAIN: ICD-10-CM

## 2022-08-22 RX ORDER — CYCLOBENZAPRINE HCL 10 MG
TABLET ORAL
Qty: 30 TABLET | Refills: 2 | Status: SHIPPED | OUTPATIENT
Start: 2022-08-22

## 2022-09-06 ENCOUNTER — OFFICE VISIT (OUTPATIENT)
Dept: FAMILY MEDICINE CLINIC | Age: 32
End: 2022-09-06
Payer: MEDICAID

## 2022-09-06 VITALS
BODY MASS INDEX: 26.62 KG/M2 | HEART RATE: 120 BPM | OXYGEN SATURATION: 96 % | HEIGHT: 61 IN | SYSTOLIC BLOOD PRESSURE: 133 MMHG | DIASTOLIC BLOOD PRESSURE: 97 MMHG | WEIGHT: 141 LBS | TEMPERATURE: 97.6 F | RESPIRATION RATE: 16 BRPM

## 2022-09-06 DIAGNOSIS — M25.511 ACUTE PAIN OF RIGHT SHOULDER: Primary | ICD-10-CM

## 2022-09-06 PROCEDURE — 99214 OFFICE O/P EST MOD 30 MIN: CPT | Performed by: NURSE PRACTITIONER

## 2022-09-06 RX ORDER — METHYLPREDNISOLONE 4 MG/1
4 TABLET ORAL
Qty: 1 DOSE PACK | Refills: 0 | Status: SHIPPED | OUTPATIENT
Start: 2022-09-06 | End: 2022-10-10 | Stop reason: ALTCHOICE

## 2022-09-06 NOTE — PROGRESS NOTES
Assessment/Plan:     Diagnoses and all orders for this visit:    1. Acute pain of right shoulder  -     REFERRAL TO PHYSICAL THERAPY  -     REFERRAL TO ORTHOPEDICS  -     XR SHOULDER RT AP/LAT MIN 2 V; Future  -     methylPREDNISolone (MEDROL DOSEPACK) 4 mg tablet; Take 1 Tablet by mouth Specific Days and Specific Times. Patient was seen in office with complaint of continued right shoulder pain. Reported steroids previously helpful. Referred to orthopedics as well as PT and we will image her shoulder. Denies any known trauma suspected overuse injury        Discussed expected course/resolution/complications of diagnosis in detail with patient. Medication risks/benefits/costs/interactions/alternatives discussed with patient. Pt was given after visit summary which includes diagnoses, current medications & vitals. Pt expressed understanding with the diagnosis and plan        Subjective:      Kumar Bardales is a 32 y.o. female who presents for had concerns including Shoulder Pain (Right shoulder pain. ). Current Outpatient Medications   Medication Sig Dispense Refill    methylPREDNISolone (MEDROL DOSEPACK) 4 mg tablet Take 1 Tablet by mouth Specific Days and Specific Times. 1 Dose Pack 0    cyclobenzaprine (FLEXERIL) 10 mg tablet TAKE 1 TABLET BY MOUTH THREE TIMES A DAY AS NEEDED FOR MUSCLE SPASMS 30 Tablet 2    ascorbic acid (VITAMIN C PO) Take  by mouth.  predniSONE (STERAPRED DS) 10 mg dose pack See administration instruction per 10mg dose pack 21 Tablet 0    metoprolol tartrate (LOPRESSOR) 25 mg tablet TAKE 1 TABLET BY MOUTH TWO TIMES A DAY. 180 Tablet 1    amLODIPine (NORVASC) 5 mg tablet Take 1 Tablet by mouth daily. Indications: high blood pressure 90 Tablet 1    ondansetron hcl (ZOFRAN) 8 mg tablet Take 1 Tablet by mouth every eight (8) hours as needed for Nausea or Vomiting.  20 Tablet 0    pantoprazole (PROTONIX) 40 mg tablet TAKE 1 TABLET BY MOUTH EVERY DAY 90 Tablet 1    L-Norgest&E Estradiol-E Estrad 0.15 mg-30 mcg (84)/10 mcg (7) 3MPk Take 1 Tablet by mouth daily.  dicyclomine (BENTYL) 20 mg tablet TAKE 1 TAB BY MOUTH EVERY SIX (6) HOURS AS NEEDED FOR ABDOMINAL CRAMPS. 706 Tablet 0    folic acid (FOLVITE) 1 mg tablet Take 1 Tablet by mouth two (2) times a day. 180 Tablet 1    Aimovig Autoinjector 140 mg/mL injection INJECT 1 SYRINGE ONCE MONTHLY      ondansetron hcl (ZOFRAN) 4 mg tablet Take 4 mg by mouth every eight (8) hours as needed for Nausea or Vomiting.  albuterol (PROVENTIL HFA, VENTOLIN HFA, PROAIR HFA) 90 mcg/actuation inhaler Take 1 Puff by inhalation every four (4) hours as needed for Wheezing. Please fill whichever albuterol 'brand' covered by pt's formulary 1 Inhaler 3    eletriptan (RELPAX) 40 mg tablet Take 40 mg by mouth once as needed. may repeat in 2 hours if necessary      potassium citrate (UROCIT-K5) 5 mEq (540 mg) TbER tablet Take 5 mEq by mouth daily. 4    sodium bicarbonate 650 mg tablet TAKE 1 TABLET BY MOUTH AFTER MEALS AS DIRECTED  1    fexofenadine (ALLEGRA) 180 mg tablet Take  by mouth.          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;    NSAID induced gastritis     Peptic ulcer     Peptic ulcer disease      Past Surgical History:   Procedure Laterality Date    HX CHOLECYSTECTOMY      HX FEMUR FRACTURE TX       Family History   Problem Relation Age of Onset    Hypertension Mother      Social History     Socioeconomic History    Marital status: SINGLE     Spouse name: Not on file    Number of children: Not on file    Years of education: Not on file    Highest education level: Not on file   Occupational History    Not on file   Tobacco Use    Smoking status: Never    Smokeless tobacco: Never   Vaping Use    Vaping Use: Never used   Substance and Sexual Activity    Alcohol use: No    Drug use: No    Sexual activity: Not on file   Other Topics Concern    Not on file   Social History Narrative    ** Merged History Encounter **          Social Determinants of Health     Financial Resource Strain: Not on file   Food Insecurity: Not on file   Transportation Needs: Not on file   Physical Activity: Not on file   Stress: Not on file   Social Connections: Not on file   Intimate Partner Violence: Not on file   Housing Stability: Not on file       HPI      ROS:   Review of Systems   Constitutional:  Negative for fever and malaise/fatigue. HENT:  Negative for congestion, sinus pain and sore throat. Respiratory:  Negative for cough and shortness of breath. Cardiovascular:  Negative for chest pain. Gastrointestinal:  Negative for diarrhea, nausea and vomiting. Genitourinary:  Negative for dysuria. Musculoskeletal:  Positive for joint pain. Skin: Negative. Neurological:  Negative for dizziness and headaches. Endo/Heme/Allergies:  Negative for environmental allergies. Psychiatric/Behavioral: Negative. Objective:   Visit Vitals  BP (!) 133/97 (BP 1 Location: Right upper arm, BP Patient Position: Sitting, BP Cuff Size: Adult)   Pulse (!) 120   Temp 97.6 °F (36.4 °C) (Skin)   Resp 16   Ht 5' 1\" (1.549 m)   Wt 141 lb (64 kg)   SpO2 96%   BMI 26.64 kg/m²         Vitals and Nurse Documentation reviewed. Physical Exam  Vitals reviewed.    Constitutional: General: She is not in acute distress. Appearance: Normal appearance. She is obese. HENT:      Head: Normocephalic and atraumatic. Mouth/Throat:      Mouth: Mucous membranes are moist.   Eyes:      Pupils: Pupils are equal, round, and reactive to light. Cardiovascular:      Rate and Rhythm: Normal rate and regular rhythm. Pulses: Normal pulses. Heart sounds: Normal heart sounds. Pulmonary:      Effort: Pulmonary effort is normal.      Breath sounds: Normal breath sounds. Musculoskeletal:         General: Tenderness present. Normal range of motion. Cervical back: Normal range of motion. Skin:     General: Skin is warm and dry. Capillary Refill: Capillary refill takes less than 2 seconds. Neurological:      General: No focal deficit present. Mental Status: She is alert and oriented to person, place, and time. Mental status is at baseline.    Psychiatric:         Mood and Affect: Mood normal.         Behavior: Behavior normal.     Results for orders placed or performed in visit on 04/01/22   LIPID PANEL   Result Value Ref Range    Cholesterol, total 179 100 - 199 mg/dL    Triglyceride 246 (H) 0 - 149 mg/dL    HDL Cholesterol 28 (L) >39 mg/dL    VLDL, calculated 43 (H) 5 - 40 mg/dL    LDL, calculated 108 (H) 0 - 99 mg/dL   HEMOGLOBIN A1C WITH EAG   Result Value Ref Range    Hemoglobin A1c 4.7 (L) 4.8 - 5.6 %    Estimated average glucose 88 mg/dL

## 2022-09-06 NOTE — PROGRESS NOTES
1. Have you been to the ER, urgent care clinic since your last visit? Hospitalized since your last visit? No    2. Have you seen or consulted any other health care providers outside of the [address] [place] [address] since your last visit? Include any pap smears or colon screening. No    Chief Complaint   Patient presents with    Shoulder Pain     Right shoulder pain. Health Maintenance Due   Topic Date Due    Hepatitis C Screening  Never done    DTaP/Tdap/Td series (1 - Tdap) Never done    Cervical cancer screen  Never done    COVID-19 Vaccine (3 - Booster for [employer] series) 10/21/2021    Flu Vaccine (1) Never done     3 most recent PHQ Screens 9/6/2022   Little interest or pleasure in doing things Not at all   Feeling down, depressed, irritable, or hopeless Not at all   Total Score PHQ 2 0     Abuse Screening Questionnaire 9/6/2022   Do you ever feel afraid of your partner? N   Are you in a relationship with someone who physically or mentally threatens you? N   Is it safe for you to go home?  Y     Visit Vitals  BP (!) 133/97 (BP 1 Location: Right upper arm, BP Patient Position: Sitting, BP Cuff Size: Adult)   Pulse (!) 120   Temp 97.6 °F (36.4 °C) (Skin)   Resp 16   Ht 5' 1\" (1.549 m)   Wt 141 lb (64 kg)   SpO2 96%   BMI 26.64 kg/m²

## 2022-09-12 DIAGNOSIS — I10 HYPERTENSION, UNSPECIFIED TYPE: ICD-10-CM

## 2022-09-12 RX ORDER — METOPROLOL TARTRATE 25 MG/1
TABLET, FILM COATED ORAL
Qty: 180 TABLET | Refills: 1 | Status: SHIPPED | OUTPATIENT
Start: 2022-09-12

## 2022-09-12 NOTE — TELEPHONE ENCOUNTER
PCP: Thaddeus Guan NP    Last appt: 9/6/2022  No future appointments.     Requested Prescriptions     Pending Prescriptions Disp Refills    metoprolol tartrate (LOPRESSOR) 25 mg tablet 180 Tablet 1       Prior labs and Blood pressures:  BP Readings from Last 3 Encounters:   09/06/22 (!) 133/97   06/30/22 122/87   04/01/22 (!) 130/90     Lab Results   Component Value Date/Time    Sodium 142 04/12/2022 10:58 AM    Potassium 4.9 04/12/2022 10:58 AM    Chloride 105 04/12/2022 10:58 AM    CO2 16 (L) 04/12/2022 10:58 AM    Glucose 81 04/12/2022 10:58 AM    BUN 8 04/12/2022 10:58 AM    Creatinine 0.68 04/12/2022 10:58 AM    BUN/Creatinine ratio 12 04/12/2022 10:58 AM    Calcium 9.5 04/12/2022 10:58 AM     Lab Results   Component Value Date/Time    Hemoglobin A1c 4.7 (L) 04/12/2022 10:53 AM     Lab Results   Component Value Date/Time    Cholesterol, total 179 04/12/2022 10:53 AM    HDL Cholesterol 28 (L) 04/12/2022 10:53 AM    LDL, calculated 108 (H) 04/12/2022 10:53 AM    VLDL, calculated 43 (H) 04/12/2022 10:53 AM    Triglyceride 246 (H) 04/12/2022 10:53 AM     No results found for: VITD3, XQVID2, XQVID3, XQVID, VD3RIA    No results found for: TSH, TSH2, TSH3, TSHP, TSHEXT

## 2022-09-29 DIAGNOSIS — R11.0 NAUSEA: ICD-10-CM

## 2022-09-29 RX ORDER — ONDANSETRON HYDROCHLORIDE 8 MG/1
TABLET, FILM COATED ORAL
Qty: 20 TABLET | Refills: 0 | Status: SHIPPED | OUTPATIENT
Start: 2022-09-29

## 2022-10-10 ENCOUNTER — OFFICE VISIT (OUTPATIENT)
Dept: FAMILY MEDICINE CLINIC | Age: 32
End: 2022-10-10
Payer: MEDICAID

## 2022-10-10 VITALS
WEIGHT: 145 LBS | BODY MASS INDEX: 27.38 KG/M2 | OXYGEN SATURATION: 97 % | HEIGHT: 61 IN | HEART RATE: 104 BPM | SYSTOLIC BLOOD PRESSURE: 131 MMHG | RESPIRATION RATE: 16 BRPM | DIASTOLIC BLOOD PRESSURE: 91 MMHG | TEMPERATURE: 97.8 F

## 2022-10-10 DIAGNOSIS — M25.511 ACUTE PAIN OF RIGHT SHOULDER: ICD-10-CM

## 2022-10-10 DIAGNOSIS — M79.10 MYALGIA: Primary | ICD-10-CM

## 2022-10-10 DIAGNOSIS — M54.2 NECK PAIN: ICD-10-CM

## 2022-10-10 PROCEDURE — 99213 OFFICE O/P EST LOW 20 MIN: CPT | Performed by: NURSE PRACTITIONER

## 2022-10-10 RX ORDER — PREDNISONE 10 MG/1
TABLET ORAL
Qty: 21 TABLET | Refills: 0 | Status: SHIPPED | OUTPATIENT
Start: 2022-10-10

## 2022-10-10 NOTE — PROGRESS NOTES
Assessment/Plan:     Diagnoses and all orders for this visit:    1. Myalgia  -     REFERRAL TO PAIN MANAGEMENT  -     predniSONE (STERAPRED DS) 10 mg dose pack; See administration instruction per 10mg dose pack  - Worsening, ref to Pain management. Start prednisone dose pack as directed, side effects discussed. Also advised to call orthopedic and let them know the pain level following injection. Seek urgent care for any worsening symptoms    2. Neck pain  -     REFERRAL TO PAIN MANAGEMENT  - Worsening, ref to pain management    3. Acute pain of right shoulder  -     REFERRAL TO PAIN MANAGEMENT  -     predniSONE (STERAPRED DS) 10 mg dose pack; See administration instruction per 10mg dose pack  - As above          Discussed expected course/resolution/complications of diagnosis in detail with patient. Medication risks/benefits/costs/interactions/alternatives discussed with patient. Pt was given after visit summary which includes diagnoses, current medications & vitals. Pt expressed understanding with the diagnosis and plan        Subjective:      Kendra Prieto is a 32 y.o. female who presents for had concerns including Follow-up, Shoulder Pain (R), Arm Pain (R), and Medication Refill (Folic acid). Here today for right shoulder pain. Went to orthopedics and got an injection in shoulder  Has constant pain when doing PT  Takes ibuprofen and then 3 hours later takes tylenol and then takes muscle relaxers. Whenever standing up gets really dizzy if the pain is between an 8-10/10.    Denies joint redness or heat at the area, denies fever    Current Outpatient Medications   Medication Sig Dispense Refill    predniSONE (STERAPRED DS) 10 mg dose pack See administration instruction per 10mg dose pack 21 Tablet 0    ondansetron hcl (ZOFRAN) 8 mg tablet TAKE 1 TABLET BY MOUTH EVERY 8 HOURS AS NEEDED FOR NAUSEA AND VOMITING 20 Tablet 0    metoprolol tartrate (LOPRESSOR) 25 mg tablet TAKE 1 TABLET BY MOUTH TWO TIMES A DAY. 180 Tablet 1    cyclobenzaprine (FLEXERIL) 10 mg tablet TAKE 1 TABLET BY MOUTH THREE TIMES A DAY AS NEEDED FOR MUSCLE SPASMS 30 Tablet 2    ascorbic acid (VITAMIN C PO) Take  by mouth.  amLODIPine (NORVASC) 5 mg tablet Take 1 Tablet by mouth daily. Indications: high blood pressure 90 Tablet 1    pantoprazole (PROTONIX) 40 mg tablet TAKE 1 TABLET BY MOUTH EVERY DAY 90 Tablet 1    L-Norgest&E Estradiol-E Estrad 0.15 mg-30 mcg (84)/10 mcg (7) 3MPk Take 1 Tablet by mouth daily.  dicyclomine (BENTYL) 20 mg tablet TAKE 1 TAB BY MOUTH EVERY SIX (6) HOURS AS NEEDED FOR ABDOMINAL CRAMPS. 211 Tablet 0    folic acid (FOLVITE) 1 mg tablet Take 1 Tablet by mouth two (2) times a day. 180 Tablet 1    Aimovig Autoinjector 140 mg/mL injection INJECT 1 SYRINGE ONCE MONTHLY      ondansetron hcl (ZOFRAN) 4 mg tablet Take 4 mg by mouth every eight (8) hours as needed for Nausea or Vomiting.  albuterol (PROVENTIL HFA, VENTOLIN HFA, PROAIR HFA) 90 mcg/actuation inhaler Take 1 Puff by inhalation every four (4) hours as needed for Wheezing. Please fill whichever albuterol 'brand' covered by pt's formulary 1 Inhaler 3    eletriptan (RELPAX) 40 mg tablet Take 40 mg by mouth once as needed. may repeat in 2 hours if necessary      potassium citrate (UROCIT-K5) 5 mEq (540 mg) TbER tablet Take 5 mEq by mouth daily. 4    sodium bicarbonate 650 mg tablet TAKE 1 TABLET BY MOUTH AFTER MEALS AS DIRECTED  1    fexofenadine (ALLEGRA) 180 mg tablet Take  by mouth.  methylPREDNISolone (MEDROL DOSEPACK) 4 mg tablet Take 1 Tablet by mouth Specific Days and Specific Times.  (Patient not taking: Reported on 10/10/2022) 1 Dose Pack 0       Allergies   Allergen Reactions    Ajovy Autoinjector [Fremanezumab-Vfrm] Shortness of Breath, Cough and Vertigo    Ibuprofen Cold [Pseudoephedrine-Ibuprofen] Other (comments)    Lodine [Etodolac] Other (comments)     Headache      Naprosyn [Naproxen] Other (comments)    Nsaids (Non-Steroidal Anti-Inflammatory Drug) Nausea and Vomiting    Topamax [Topiramate] Other (comments)     Abdominal pain, Lactic Acidosis     Past Medical History:   Diagnosis Date    [facility]     Cerebrovascular accident (CVA) ([facility]      in In utero; 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; right side of brain; 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;    NSAID induced gastritis     Peptic ulcer     Peptic ulcer disease      Past Surgical History:   Procedure Laterality Date    HX CHOLECYSTECTOMY      HX FEMUR FRACTURE TX       Family History   Problem Relation Age of Onset    Hypertension Mother      Social History     Socioeconomic History    Marital status: SINGLE     Spouse name: Not on file    Number of children: Not on file    Years of education: Not on file    Highest education level: Not on file   Occupational History    Not on file   Tobacco Use    Smoking status: Never    Smokeless tobacco: Never   Vaping Use    Vaping Use: Never used   Substance and Sexual Activity    Alcohol use: No    Drug use: No    Sexual activity: Not on file   Other Topics Concern    Not on file   Social History Narrative    ** Merged History Encounter **          Social Determinants of Health     Financial Resource Strain: Not on file   Food Insecurity: Not on file   Transportation Needs: Not on file   Physical Activity: Not on file   Stress: Not on file   Social Connections: Not on file   Intimate Partner Violence: Not on file   Housing Stability: Not on file       HPI      ROS:   Review of Systems   Constitutional:  Negative for chills, fever and malaise/fatigue. Eyes:  Negative for blurred vision. Respiratory:  Negative for cough and shortness of breath. Cardiovascular:  Negative for chest pain, palpitations and leg swelling. Musculoskeletal:  Positive for joint pain and myalgias. Neurological:  Negative for dizziness and headaches. Objective:   Visit Vitals  BP (!) 131/91 (BP 1 Location: Right upper arm, BP Patient Position: Sitting, BP Cuff Size: Adult)   Pulse (!) 104   Temp 97.8 °F (36.6 °C) (Temporal)   Resp 16   Ht 5' 1\" (1.549 m)   Wt 145 lb (65.8 kg)   LMP 07/31/2022 (Approximate)   SpO2 97%   BMI 27.40 kg/m²         Vitals and Nurse Documentation reviewed. Physical Exam  Constitutional:       General: She is not in acute distress. Appearance: She is not diaphoretic. HENT:      Head: Normocephalic and atraumatic. Neck:      Comments: Mild pain with ROM neck movements  Cardiovascular:      Rate and Rhythm: Normal rate and regular rhythm. Heart sounds: Normal heart sounds. No murmur heard. No friction rub. No gallop. Pulmonary:      Effort: Pulmonary effort is normal. No respiratory distress. Breath sounds: Normal breath sounds. No wheezing or rales. Musculoskeletal:      Right shoulder: No swelling, tenderness or crepitus. Normal range of motion. Normal strength. Normal pulse. Left shoulder: No swelling. Normal range of motion. Normal strength. Right wrist: No swelling, tenderness or crepitus. Normal range of motion. Cervical back: No signs of trauma, torticollis or crepitus. No pain with movement or muscular tenderness. Normal range of motion. Comments: No swelling, TTP or erythema noted  Normal ROM with mild pain on overhead movements  No pain with internal and external rotation  No weakness noted  Negative drop arm, neers test, reno test, empty can   strength is 5/5. Sensation is intact distally with radial pulse 2+. Left shoulder and arm exam at baseline   Lymphadenopathy:      Cervical: No cervical adenopathy. Neurological:      Mental Status: She is alert and oriented to person, place, and time.    Psychiatric:         Mood and Affect: Affect normal.       Results for orders placed or performed in visit on 04/01/22   LIPID PANEL   Result Value Ref Range Cholesterol, total 179 100 - 199 mg/dL    Triglyceride 246 (H) 0 - 149 mg/dL    HDL Cholesterol 28 (L) >39 mg/dL    VLDL, calculated 43 (H) 5 - 40 mg/dL    LDL, calculated 108 (H) 0 - 99 mg/dL   HEMOGLOBIN A1C WITH EAG   Result Value Ref Range    Hemoglobin A1c 4.7 (L) 4.8 - 5.6 %    Estimated average glucose 88 mg/dL

## 2022-10-10 NOTE — PROGRESS NOTES
Identified pt with two pt identifiers. Reviewed record in preparation for visit and have obtained necessary documentation. All patient medications has been reviewed. Chief Complaint   Patient presents with    Follow-up    Shoulder Pain     R    Arm Pain     R    Medication Refill     Folic acid     Additional information about chief complaint:    Visit Vitals  BP (!) 131/91 (BP 1 Location: Right upper arm, BP Patient Position: Sitting, BP Cuff Size: Adult)   Pulse (!) 104   Temp 97.8 °F (36.6 °C) (Temporal)   Resp 16   Ht 5' 1\" (1.549 m)   Wt 145 lb (65.8 kg)   SpO2 97%   BMI 27.40 kg/m²       Health Maintenance Due   Topic    Hepatitis C Screening     DTaP/Tdap/Td series (1 - Tdap)    Cervical cancer screen     COVID-19 Vaccine (3 - Booster for Pfizer series)    Flu Vaccine (1)       1. Have you been to the ER, urgent care clinic since your last visit? Hospitalized since your last visit? Went to see ortho on 9/14/22 and has not followed up with PT as of yet. 2. Have you seen or consulted any other health care providers outside of the [address] [place] [address] since your last visit? Include any pap smears or colon screening.  No

## 2022-10-12 ENCOUNTER — TELEPHONE (OUTPATIENT)
Dept: FAMILY MEDICINE CLINIC | Age: 32
End: 2022-10-12

## 2022-10-12 NOTE — TELEPHONE ENCOUNTER
pts mom states daughter was referred to pain   management dr but none of the drs take medicaid. states that she needs   medication for pain. her pain level is 11 or 12. states if she is unable   to get medication filled she will have to take her to the er and she would   really like to avoid this. pt is unable to eat and sleep due to the pain   level   ---------------------------------------------------------------------------   --------------   CALL BACK INFO   [phone]; OK to leave message on voicemail      I spoke to Mrs. Yessi Plaza. She will take her to the ER and call the rep for the insurance to see who takes her insurance.

## 2022-10-17 DIAGNOSIS — R10.9 ABDOMINAL CRAMPS: ICD-10-CM

## 2022-10-17 DIAGNOSIS — K21.9 GASTROESOPHAGEAL REFLUX DISEASE, UNSPECIFIED WHETHER ESOPHAGITIS PRESENT: ICD-10-CM

## 2022-10-18 RX ORDER — PANTOPRAZOLE SODIUM 40 MG/1
TABLET, DELAYED RELEASE ORAL
Qty: 90 TABLET | Refills: 1 | Status: SHIPPED | OUTPATIENT
Start: 2022-10-18

## 2022-10-21 DIAGNOSIS — D64.9 ANEMIA, UNSPECIFIED TYPE: ICD-10-CM

## 2022-10-21 DIAGNOSIS — I10 HYPERTENSION, UNSPECIFIED TYPE: ICD-10-CM

## 2022-10-21 RX ORDER — FOLIC ACID 1 MG/1
TABLET ORAL
Qty: 180 TABLET | Refills: 1 | Status: SHIPPED | OUTPATIENT
Start: 2022-10-21

## 2022-10-21 RX ORDER — AMLODIPINE BESYLATE 5 MG/1
TABLET ORAL
Qty: 90 TABLET | Refills: 1 | Status: SHIPPED | OUTPATIENT
Start: 2022-10-21

## 2022-10-26 ENCOUNTER — TELEPHONE (OUTPATIENT)
Dept: FAMILY MEDICINE CLINIC | Age: 32
End: 2022-10-26

## 2022-10-26 NOTE — TELEPHONE ENCOUNTER
Spoke with patient's mother. Patient takes pantoprazole, but their insurance says the patient needs a prior auth every six months and the patient can take it only every other month. Caller was wondering what OTC medications the patient can use in those off months.

## 2022-11-09 DIAGNOSIS — M62.838 MUSCLE SPASM: ICD-10-CM

## 2022-11-09 DIAGNOSIS — R52 PAIN: ICD-10-CM

## 2022-11-11 DIAGNOSIS — R11.0 NAUSEA: ICD-10-CM

## 2022-11-11 RX ORDER — CYCLOBENZAPRINE HCL 10 MG
TABLET ORAL
Qty: 30 TABLET | Refills: 2 | Status: SHIPPED | OUTPATIENT
Start: 2022-11-11

## 2022-11-11 RX ORDER — ONDANSETRON HYDROCHLORIDE 8 MG/1
TABLET, FILM COATED ORAL
Qty: 20 TABLET | Refills: 0 | Status: SHIPPED | OUTPATIENT
Start: 2022-11-11

## 2022-11-11 NOTE — TELEPHONE ENCOUNTER
PCP: Dena Teran NP    Last appt: 10/10/2022  No future appointments.     Requested Prescriptions      No prescriptions requested or ordered in this encounter       Prior labs and Blood pressures:  BP Readings from Last 3 Encounters:   10/10/22 (!) 131/91   09/06/22 (!) 133/97   06/30/22 122/87     Lab Results   Component Value Date/Time    Sodium 142 04/12/2022 10:58 AM    Potassium 4.9 04/12/2022 10:58 AM    Chloride 105 04/12/2022 10:58 AM    CO2 16 (L) 04/12/2022 10:58 AM    Glucose 81 04/12/2022 10:58 AM    BUN 8 04/12/2022 10:58 AM    Creatinine 0.68 04/12/2022 10:58 AM    BUN/Creatinine ratio 12 04/12/2022 10:58 AM    Calcium 9.5 04/12/2022 10:58 AM     Lab Results   Component Value Date/Time    Hemoglobin A1c 4.7 (L) 04/12/2022 10:53 AM     Lab Results   Component Value Date/Time    Cholesterol, total 179 04/12/2022 10:53 AM    HDL Cholesterol 28 (L) 04/12/2022 10:53 AM    LDL, calculated 108 (H) 04/12/2022 10:53 AM    VLDL, calculated 43 (H) 04/12/2022 10:53 AM    Triglyceride 246 (H) 04/12/2022 10:53 AM     No results found for: VITD3, XQVID2, XQVID3, XQVID, VD3RIA    No results found for: TSH, TSH2, TSH3, TSHP, TSHEXT

## 2022-12-07 DIAGNOSIS — R11.0 NAUSEA: ICD-10-CM

## 2022-12-08 RX ORDER — ONDANSETRON HYDROCHLORIDE 8 MG/1
TABLET, FILM COATED ORAL
Qty: 20 TABLET | Refills: 0 | Status: SHIPPED | OUTPATIENT
Start: 2022-12-08

## 2022-12-26 DIAGNOSIS — M62.838 MUSCLE SPASM: ICD-10-CM

## 2022-12-26 DIAGNOSIS — R52 PAIN: ICD-10-CM

## 2022-12-28 RX ORDER — CYCLOBENZAPRINE HCL 10 MG
TABLET ORAL
Qty: 30 TABLET | Refills: 2 | Status: SHIPPED | OUTPATIENT
Start: 2022-12-28

## 2023-01-17 ENCOUNTER — VIRTUAL VISIT (OUTPATIENT)
Dept: FAMILY MEDICINE CLINIC | Age: 33
End: 2023-01-17
Payer: MEDICAID

## 2023-01-17 DIAGNOSIS — N30.00 ACUTE CYSTITIS WITHOUT HEMATURIA: Primary | ICD-10-CM

## 2023-01-17 RX ORDER — NITROFURANTOIN 25; 75 MG/1; MG/1
100 CAPSULE ORAL 2 TIMES DAILY
Qty: 14 CAPSULE | Refills: 0 | Status: SHIPPED | OUTPATIENT
Start: 2023-01-17

## 2023-01-17 NOTE — PROGRESS NOTES
Gretchen Guido is a 28 y.o. female who was seen by synchronous (real-time) audio-video technology on 1/17/2023 for No chief complaint on file. Assessment & Plan:   Diagnoses and all orders for this visit:    1. Acute cystitis without hematuria  -     nitrofurantoin, macrocrystal-monohydrate, (MACROBID) 100 mg capsule; Take 1 Capsule by mouth two (2) times a day. - Worsening, will treat with nitrofurantoin as directed, side effects discussed. Advised patient and mother if no symptom improvement will need to follow-up in clinic for urine sample      I spent at least 6 minutes on this visit with this established patient. Subjective:   VV today for urinary symptoms  Mother present at visit  Mom thinks patient may have UTI. Having pain in bladder. Urinary hesitancy. Denies hematuria or urinary odor. With the patient patient for MRI of shoulder due to ongoing pain  Will hold off on home physical therapy at this time    Prior to Admission medications    Medication Sig Start Date End Date Taking?  Authorizing Provider   ondansetron hcl (ZOFRAN) 8 mg tablet TAKE 1 TABLET BY MOUTH EVERY 8 HOURS AS NEEDED FOR NAUSEA AND VOMITING 1/9/23   Bruce Hoskins NP   cyclobenzaprine (FLEXERIL) 10 mg tablet TAKE 1 TABLET BY MOUTH THREE TIMES A DAY AS NEEDED FOR MUSCLE SPASMS 12/28/22   Tom Winn MD   folic acid (FOLVITE) 1 mg tablet TAKE 1 TABLET BY MOUTH TWICE A DAY 10/21/22   Chiara Hoskins NP   amLODIPine (NORVASC) 5 mg tablet TAKE 1 TABLET BY MOUTH EVERY DAY FOR HIGH BLOOD PRESSURE 10/21/22   Purnima Snell NP   pantoprazole (PROTONIX) 40 mg tablet TAKE 1 TABLET BY MOUTH EVERY DAY 10/18/22   Combs, Michalene Goldmann B, NP   predniSONE (STERAPRED DS) 10 mg dose pack See administration instruction per 10mg dose pack 10/10/22   Chiara Hoskins NP   metoprolol tartrate (LOPRESSOR) 25 mg tablet TAKE 1 TABLET BY MOUTH TWO TIMES A DAY. 9/12/22   Purnima Snell NP   ascorbic acid (VITAMIN C PO) Take  by mouth.    Provider, Historical   L-Norgest&E Estradiol-E Estrad 0.15 mg-30 mcg (84)/10 mcg (7) 3MPk Take 1 Tablet by mouth daily. 2/7/22   Provider, Historical   dicyclomine (BENTYL) 20 mg tablet TAKE 1 TAB BY MOUTH EVERY SIX (6) HOURS AS NEEDED FOR ABDOMINAL CRAMPS. 1/18/22   Shira Batres, NP   Aimovig Autoinjector 140 mg/mL injection INJECT 1 SYRINGE ONCE MONTHLY 12/30/20   Provider, Historical   albuterol (PROVENTIL HFA, VENTOLIN HFA, PROAIR HFA) 90 mcg/actuation inhaler Take 1 Puff by inhalation every four (4) hours as needed for Wheezing. Please fill whichever albuterol 'brand' covered by pt's formulary 7/28/20   Randell Gupta MD   eletriptan (RELPAX) 40 mg tablet Take 40 mg by mouth once as needed. may repeat in 2 hours if necessary    Provider, Historical   potassium citrate (UROCIT-K5) 5 mEq (540 mg) TbER tablet Take 5 mEq by mouth daily. 10/12/18   Provider, Historical   sodium bicarbonate 650 mg tablet TAKE 1 TABLET BY MOUTH AFTER MEALS AS DIRECTED 10/11/18   Provider, Historical   fexofenadine (ALLEGRA) 180 mg tablet Take  by mouth. Provider, Historical     Patient Active Problem List   Diagnosis Code    Body mass index 25.0-25.9, adult Z68.25    Dizziness R42    Dyspnea R06.00    GERD (gastroesophageal reflux disease) K21.9    Renal tubular acidosis N25.89       Review of Systems   Constitutional:  Negative for chills, fever and malaise/fatigue. Eyes:  Negative for blurred vision. Respiratory:  Negative for cough and shortness of breath. Cardiovascular:  Negative for chest pain, palpitations and leg swelling. Genitourinary:  Positive for dysuria. Negative for flank pain, frequency, hematuria and urgency. Hesitancy   Musculoskeletal:  Positive for joint pain. Neurological:  Negative for dizziness and headaches.      Objective:     Patient-Reported Vitals 3/2/2021   Patient-Reported Weight -   Patient-Reported Height -   Patient-Reported Pulse -   Patient-Reported Systolic 126   Patient-Reported Diastolic 86        [INSTRUCTIONS:  \"[x]\" Indicates a positive item  \"[]\" Indicates a negative item  -- DELETE ALL ITEMS NOT EXAMINED]    Constitutional: [x] Appears well-developed and well-nourished [x] No apparent distress      [] Abnormal -     Mental status: [x] Alert and awake  [x] Oriented to person/place/time [x] Able to follow commands    [] Abnormal -     Eyes:   EOM    [x]  Normal    [] Abnormal -   Sclera  [x]  Normal    [] Abnormal -          Discharge [x]  None visible   [] Abnormal -     HENT: [x] Normocephalic, atraumatic  [] Abnormal -   [x] Mouth/Throat: Mucous membranes are moist    External Ears [x] Normal  [] Abnormal -    Neck: [x] No visualized mass [] Abnormal -     Pulmonary/Chest: [x] Respiratory effort normal   [x] No visualized signs of difficulty breathing or respiratory distress        [] Abnormal -      Musculoskeletal:   [x] Normal gait with no signs of ataxia         [x] Normal range of motion of neck        [] Abnormal -     Neurological:        [x] No Facial Asymmetry (Cranial nerve 7 motor function) (limited exam due to video visit)          [x] No gaze palsy        [] Abnormal -          Skin:        [x] No significant exanthematous lesions or discoloration noted on facial skin         [] Abnormal -            Psychiatric:       [x] Normal Affect [] Abnormal -        [x] No Hallucinations    Other pertinent observable physical exam findings:-        We discussed the expected course, resolution and complications of the diagnosis(es) in detail. Medication risks, benefits, costs, interactions, and alternatives were discussed as indicated. I advised her to contact the office if her condition worsens, changes or fails to improve as anticipated. She expressed understanding with the diagnosis(es) and plan. Will Fuentesty, was evaluated through a synchronous (real-time) audio-video encounter.  The patient (or guardian if applicable) is aware that this is a billable service, which includes applicable co-pays. This Virtual Visit was conducted with patient's (and/or legal guardian's) consent. The visit was conducted pursuant to the emergency declaration under the [address] [place] [address] authority and the [employer] and [employer] General Act. Patient identification was verified, and a caregiver was present when appropriate. The patient was located at: [facility] [id number]  The provider was located at:  Facility (Appt Department): [address]

## 2023-01-20 ENCOUNTER — TELEPHONE (OUTPATIENT)
Dept: FAMILY MEDICINE CLINIC | Age: 33
End: 2023-01-20

## 2023-01-23 DIAGNOSIS — R00.0 TACHYCARDIA: Primary | ICD-10-CM

## 2023-01-30 DIAGNOSIS — R52 PAIN: ICD-10-CM

## 2023-01-30 DIAGNOSIS — M62.838 MUSCLE SPASM: ICD-10-CM

## 2023-01-31 RX ORDER — CYCLOBENZAPRINE HCL 10 MG
TABLET ORAL
Qty: 30 TABLET | Refills: 2 | Status: SHIPPED | OUTPATIENT
Start: 2023-01-31

## 2023-02-09 ENCOUNTER — VIRTUAL VISIT (OUTPATIENT)
Dept: FAMILY MEDICINE CLINIC | Age: 33
End: 2023-02-09
Payer: MEDICAID

## 2023-02-09 DIAGNOSIS — I10 HYPERTENSION, UNSPECIFIED TYPE: ICD-10-CM

## 2023-02-09 DIAGNOSIS — Z09 HOSPITAL DISCHARGE FOLLOW-UP: Primary | ICD-10-CM

## 2023-02-09 DIAGNOSIS — M79.10 MYALGIA: ICD-10-CM

## 2023-02-09 DIAGNOSIS — G62.9 NEUROPATHY: ICD-10-CM

## 2023-02-09 DIAGNOSIS — N39.0 URINARY TRACT INFECTION WITHOUT HEMATURIA, SITE UNSPECIFIED: ICD-10-CM

## 2023-02-09 PROCEDURE — 1111F DSCHRG MED/CURRENT MED MERGE: CPT | Performed by: NURSE PRACTITIONER

## 2023-02-09 PROCEDURE — 99214 OFFICE O/P EST MOD 30 MIN: CPT | Performed by: NURSE PRACTITIONER

## 2023-02-09 RX ORDER — METHOCARBAMOL 500 MG/1
500 TABLET, FILM COATED ORAL 3 TIMES DAILY
COMMUNITY
Start: 2023-02-02

## 2023-02-09 RX ORDER — GABAPENTIN 100 MG/1
100 CAPSULE ORAL 3 TIMES DAILY
Qty: 90 CAPSULE | Refills: 0 | Status: SHIPPED | OUTPATIENT
Start: 2023-02-09

## 2023-02-09 NOTE — PROGRESS NOTES
Assessment/Plan:             Discussed expected course/resolution/complications of diagnosis in detail with patient. Medication risks/benefits/costs/interactions/alternatives discussed with patient. Pt was given after visit summary which includes diagnoses, current medications & vitals. Pt expressed understanding with the diagnosis and plan        Subjective:      Jeri Chowdary is a 28 y.o. female who presents for had no chief complaint listed for this encounter. Current Outpatient Medications   Medication Sig Dispense Refill    cyclobenzaprine (FLEXERIL) 10 mg tablet TAKE 1 TABLET BY MOUTH THREE TIMES A DAY AS NEEDED FOR MUSCLE SPASMS 30 Tablet 2    nitrofurantoin, macrocrystal-monohydrate, (MACROBID) 100 mg capsule Take 1 Capsule by mouth two (2) times a day. 14 Capsule 0    ondansetron hcl (ZOFRAN) 8 mg tablet TAKE 1 TABLET BY MOUTH EVERY 8 HOURS AS NEEDED FOR NAUSEA AND VOMITING 20 Tablet 1    folic acid (FOLVITE) 1 mg tablet TAKE 1 TABLET BY MOUTH TWICE A  Tablet 1    amLODIPine (NORVASC) 5 mg tablet TAKE 1 TABLET BY MOUTH EVERY DAY FOR HIGH BLOOD PRESSURE 90 Tablet 1    pantoprazole (PROTONIX) 40 mg tablet TAKE 1 TABLET BY MOUTH EVERY DAY 90 Tablet 1    predniSONE (STERAPRED DS) 10 mg dose pack See administration instruction per 10mg dose pack 21 Tablet 0    metoprolol tartrate (LOPRESSOR) 25 mg tablet TAKE 1 TABLET BY MOUTH TWO TIMES A DAY. 180 Tablet 1    ascorbic acid (VITAMIN C PO) Take  by mouth. L-Norgest&E Estradiol-E Estrad 0.15 mg-30 mcg (84)/10 mcg (7) 3MPk Take 1 Tablet by mouth daily. dicyclomine (BENTYL) 20 mg tablet TAKE 1 TAB BY MOUTH EVERY SIX (6) HOURS AS NEEDED FOR ABDOMINAL CRAMPS. 180 Tablet 0    Aimovig Autoinjector 140 mg/mL injection INJECT 1 SYRINGE ONCE MONTHLY      albuterol (PROVENTIL HFA, VENTOLIN HFA, PROAIR HFA) 90 mcg/actuation inhaler Take 1 Puff by inhalation every four (4) hours as needed for Wheezing.  Please fill whichever albuterol 'brand' covered by pt's formulary 1 Inhaler 3    eletriptan (RELPAX) 40 mg tablet Take 40 mg by mouth once as needed. may repeat in 2 hours if necessary      potassium citrate (UROCIT-K5) 5 mEq (540 mg) TbER tablet Take 5 mEq by mouth daily. 4    sodium bicarbonate 650 mg tablet TAKE 1 TABLET BY MOUTH AFTER MEALS AS DIRECTED  1    fexofenadine (ALLEGRA) 180 mg tablet Take  by mouth.          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[facility] [address]      in In utero; 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; right side of brain; 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;    NSAID induced gastritis     Peptic ulcer     Peptic ulcer disease      Past Surgical History:   Procedure Laterality Date    HX CHOLECYSTECTOMY      HX FEMUR FRACTURE [place]       Family History   Problem Relation Age of Onset    Hypertension Mother      Social History     Socioeconomic History    Marital status: SINGLE     Spouse name: Not on file    Number of children: Not on file    Years of education: Not on file    Highest education level: Not on file   Occupational History    Not on file   Tobacco Use    Smoking status: Never    Smokeless tobacco: Never   Vaping Use    Vaping Use: Never used   Substance and Sexual Activity    Alcohol use: No    Drug use: No    Sexual activity: Not on file   Other Topics Concern    Not on file   Social History Narrative    ** Merged History Encounter **          Social Determinants of Health     Financial Resource Strain: Not on file   Food Insecurity: Not on file   Transportation Needs: Not on file   Physical Activity: Not on file   Stress: Not on file   Social Connections: Not on file   Intimate Partner Violence: Not on file   Housing Stability: Not on file       HPI      ROS:   ROS    Objective: There were no vitals taken for this visit. Vitals and Nurse Documentation reviewed. Physical Exam    Results for orders placed or performed in visit on 22   LIPID PANEL   Result Value Ref Range    Cholesterol, total 179 100 - 199 mg/dL    Triglyceride 246 (H) 0 - 149 mg/dL    HDL Cholesterol 28 (L) >39 mg/dL    VLDL, calculated 43 (H) 5 - 40 mg/dL    LDL, calculated 108 (H) 0 - 99 mg/dL   HEMOGLOBIN A1C WITH EAG   Result Value Ref Range    Hemoglobin A1c 4.7 (L) 4.8 - 5.6 %    Estimated average glucose 88 mg/dL         Transitional Care Management Progress Note    Patient: Mariangel Ryan  : 1990  PCP: Octavio Ellis NP    Date of admission:   Date of discharge:     Patient was contacted by Transitional Care Management services within two days after her discharge: Yes. This encounter and supporting documentation was reviewed if available. Medication reconciliation was performed today (2023). Assessment/Plan:        Subjective:   Mariangel Ryan is a 28 y.o. female presenting today for follow-up after being discharged from [facility].  The discharge summary was reviewed or requested. The main problem requiring admission was lactic acidosis. Complications during admission: none    Interval history/Current status: better    Admitting symptoms have: improved      Medications marked \"taking\" at this time:  Home Medications    Medication Sig Start Date End Date Taking?  Authorizing Provider   cyclobenzaprine (FLEXERIL) 10 mg tablet TAKE 1 TABLET BY MOUTH THREE TIMES A DAY AS NEEDED FOR MUSCLE SPASMS 23   MD john Garciafurandeidre, Imtiaz Davison, (MACROBID) 100 mg capsule Take 1 Capsule by mouth two (2) times a day. 1/17/23   Farhad Hoskins NP   ondansetron hcl (ZOFRAN) 8 mg tablet TAKE 1 TABLET BY MOUTH EVERY 8 HOURS AS NEEDED FOR NAUSEA AND VOMITING 1/9/23   Farhad Hoskins NP   folic acid (FOLVITE) 1 mg tablet TAKE 1 TABLET BY MOUTH TWICE A DAY 10/21/22   Chiara Hoskins NP   amLODIPine (NORVASC) 5 mg tablet TAKE 1 TABLET BY MOUTH EVERY DAY FOR HIGH BLOOD PRESSURE 10/21/22   Alex Garcia NP   pantoprazole (PROTONIX) 40 mg tablet TAKE 1 TABLET BY MOUTH EVERY DAY 10/18/22   Farhad Hoskins NP   predniSONE (STERAPRED DS) 10 mg dose pack See administration instruction per 10mg dose pack 10/10/22   Chiara Hoskins NP   metoprolol tartrate (LOPRESSOR) 25 mg tablet TAKE 1 TABLET BY MOUTH TWO TIMES A DAY. 9/12/22   Alex Garcia NP   ascorbic acid (VITAMIN C PO) Take  by mouth. Provider, Historical   L-Norgest&E Estradiol-E Estrad 0.15 mg-30 mcg (84)/10 mcg (7) 3MPk Take 1 Tablet by mouth daily. 2/7/22   Provider, Historical   dicyclomine (BENTYL) 20 mg tablet TAKE 1 TAB BY MOUTH EVERY SIX (6) HOURS AS NEEDED FOR ABDOMINAL CRAMPS. 1/18/22   Prachi Majano NP   Aimovig Autoinjector 140 mg/mL injection INJECT 1 SYRINGE ONCE MONTHLY 12/30/20   Provider, Historical   albuterol (PROVENTIL HFA, VENTOLIN HFA, PROAIR HFA) 90 mcg/actuation inhaler Take 1 Puff by inhalation every four (4) hours as needed for Wheezing. Please fill whichever albuterol 'brand' covered by pt's formulary 7/28/20   Beau Gupta MD   eletriptan (RELPAX) 40 mg tablet Take 40 mg by mouth once as needed. may repeat in 2 hours if necessary    Provider, Historical   potassium citrate (UROCIT-K5) 5 mEq (540 mg) TbER tablet Take 5 mEq by mouth daily. 10/12/18   Provider, Historical   sodium bicarbonate 650 mg tablet TAKE 1 TABLET BY MOUTH AFTER MEALS AS DIRECTED 10/11/18   Provider, Historical   fexofenadine (ALLEGRA) 180 mg tablet Take  by mouth.     Provider, Historical        Review of Systems:  History obtained from the patient       Patient Active Problem List   Diagnosis Code    Body mass index 25.0-25.9, adult Z68.25    Dizziness R42    Dyspnea R06.00    GERD (gastroesophageal reflux disease) K21.9    Renal tubular acidosis N25.89     Patient Active Problem List    Diagnosis Date Noted    Renal tubular acidosis 06/30/2022    Body mass index 25.0-25.9, adult 08/23/2018    Dizziness 08/23/2018    Dyspnea 08/23/2018    GERD (gastroesophageal reflux disease) 08/23/2018     Current Outpatient Medications   Medication Sig Dispense Refill    cyclobenzaprine (FLEXERIL) 10 mg tablet TAKE 1 TABLET BY MOUTH THREE TIMES A DAY AS NEEDED FOR MUSCLE SPASMS 30 Tablet 2    nitrofurantoin, macrocrystal-monohydrate, (MACROBID) 100 mg capsule Take 1 Capsule by mouth two (2) times a day. 14 Capsule 0    ondansetron hcl (ZOFRAN) 8 mg tablet TAKE 1 TABLET BY MOUTH EVERY 8 HOURS AS NEEDED FOR NAUSEA AND VOMITING 20 Tablet 1    folic acid (FOLVITE) 1 mg tablet TAKE 1 TABLET BY MOUTH TWICE A  Tablet 1    amLODIPine (NORVASC) 5 mg tablet TAKE 1 TABLET BY MOUTH EVERY DAY FOR HIGH BLOOD PRESSURE 90 Tablet 1    pantoprazole (PROTONIX) 40 mg tablet TAKE 1 TABLET BY MOUTH EVERY DAY 90 Tablet 1    predniSONE (STERAPRED DS) 10 mg dose pack See administration instruction per 10mg dose pack 21 Tablet 0    metoprolol tartrate (LOPRESSOR) 25 mg tablet TAKE 1 TABLET BY MOUTH TWO TIMES A DAY. 180 Tablet 1    ascorbic acid (VITAMIN C PO) Take  by mouth. L-Norgest&E Estradiol-E Estrad 0.15 mg-30 mcg (84)/10 mcg (7) 3MPk Take 1 Tablet by mouth daily. dicyclomine (BENTYL) 20 mg tablet TAKE 1 TAB BY MOUTH EVERY SIX (6) HOURS AS NEEDED FOR ABDOMINAL CRAMPS. 180 Tablet 0    Aimovig Autoinjector 140 mg/mL injection INJECT 1 SYRINGE ONCE MONTHLY      albuterol (PROVENTIL HFA, VENTOLIN HFA, PROAIR HFA) 90 mcg/actuation inhaler Take 1 Puff by inhalation every four (4) hours as needed for Wheezing. Please fill whichever albuterol 'brand' covered by pt's formulary 1 Inhaler 3    eletriptan (RELPAX) 40 mg tablet Take 40 mg by mouth once as needed. may repeat in 2 hours if necessary      potassium citrate (UROCIT-K5) 5 mEq (540 mg) TbER tablet Take 5 mEq by mouth daily. 4    sodium bicarbonate 650 mg tablet TAKE 1 TABLET BY MOUTH AFTER MEALS AS DIRECTED  1    fexofenadine (ALLEGRA) 180 mg tablet Take  by mouth. 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 Physical Examination: General appearance - alert, well appearing, and in no distress and oriented to person, place, and time      We discussed the expected course, resolution and complications of the diagnosis(es) in detail. Medication risks, benefits, costs, interactions, and alternatives were discussed as indicated. I advised her to contact the office if her condition worsens, changes or fails to improve as anticipated. She expressed understanding with the diagnosis(es) and plan.      Bal Álvarez NP    Transitional Care Management Progress Note    Patient: Denisa Tenorio  : 1990  PCP: Bria Jacob NP    Date of admission:   Date of discharge: 2/6    Patient was contacted by Transitional Care Management services within two days after her discharge: Yes. This encounter and supporting documentation was reviewed if available. Medication reconciliation was performed today (2/9/2023). Assessment/Plan:        Subjective:   Liseth Barber is a 28 y.o. female presenting today for follow-up after being discharged from [facility] [place].  The discharge summary was reviewed or requested. The main problem requiring admission was lactic acid. Complications during admission: none    Interval history/Current status: better    Admitting symptoms have: improved    Medications marked \"taking\" at this time:  Home Medications    Medication Sig Start Date End Date Taking? Authorizing Provider   cyclobenzaprine (FLEXERIL) 10 mg tablet TAKE 1 TABLET BY MOUTH THREE TIMES A DAY AS NEEDED FOR MUSCLE SPASMS 1/31/23   Vinny Chiu MD   nitrofurantoin, macrocrystal-monohydrate, (MACROBID) 100 mg capsule Take 1 Capsule by mouth two (2) times a day. 1/17/23   Twyla Hoskins NP   ondansetron hcl (ZOFRAN) 8 mg tablet TAKE 1 TABLET BY MOUTH EVERY 8 HOURS AS NEEDED FOR NAUSEA AND VOMITING 1/9/23   Twyla Hoskins NP   folic acid (FOLVITE) 1 mg tablet TAKE 1 TABLET BY MOUTH TWICE A DAY 10/21/22   Chiara Hoskins NP   amLODIPine (NORVASC) 5 mg tablet TAKE 1 TABLET BY MOUTH EVERY DAY FOR HIGH BLOOD PRESSURE 10/21/22   Clayton Vigil NP   pantoprazole (PROTONIX) 40 mg tablet TAKE 1 TABLET BY MOUTH EVERY DAY 10/18/22   Twyla Hoskins NP   predniSONE (STERAPRED DS) 10 mg dose pack See administration instruction per 10mg dose pack 10/10/22   Chiara Hoskins NP   metoprolol tartrate (LOPRESSOR) 25 mg tablet TAKE 1 TABLET BY MOUTH TWO TIMES A DAY. 9/12/22   Clayton Vigil NP   ascorbic acid (VITAMIN C PO) Take  by mouth. Provider, Historical   L-Norgest&E Estradiol-E Estrad 0.15 mg-30 mcg (84)/10 mcg (7) 3MPk Take 1 Tablet by mouth daily.  2/7/22   Provider, Historical   dicyclomine (BENTYL) 20 mg tablet TAKE 1 TAB BY MOUTH EVERY SIX (6) HOURS AS NEEDED FOR ABDOMINAL CRAMPS. 1/18/22   Mike Mathews NP   Aimovig Autoinjector 140 mg/mL injection INJECT 1 SYRINGE ONCE MONTHLY 12/30/20   Provider, Historical   albuterol (PROVENTIL HFA, VENTOLIN HFA, PROAIR HFA) 90 mcg/actuation inhaler Take 1 Puff by inhalation every four (4) hours as needed for Wheezing. Please fill whichever albuterol 'brand' covered by pt's formulary 7/28/20   Zac Gupta MD   eletriptan (RELPAX) 40 mg tablet Take 40 mg by mouth once as needed. may repeat in 2 hours if necessary    Provider, Historical   potassium citrate (UROCIT-K5) 5 mEq (540 mg) TbER tablet Take 5 mEq by mouth daily. 10/12/18   Provider, Historical   sodium bicarbonate 650 mg tablet TAKE 1 TABLET BY MOUTH AFTER MEALS AS DIRECTED 10/11/18   Provider, Historical   fexofenadine (ALLEGRA) 180 mg tablet Take  by mouth. Provider, Historical        Review of Systems:  History obtained from the patient       Patient Active Problem List   Diagnosis Code    Body mass index 25.0-25.9, adult Z68.25    Dizziness R42    Dyspnea R06.00    GERD (gastroesophageal reflux disease) K21.9    Renal tubular acidosis N25.89     Patient Active Problem List    Diagnosis Date Noted    Renal tubular acidosis 06/30/2022    Body mass index 25.0-25.9, adult 08/23/2018    Dizziness 08/23/2018    Dyspnea 08/23/2018    GERD (gastroesophageal reflux disease) 08/23/2018     Current Outpatient Medications   Medication Sig Dispense Refill    cyclobenzaprine (FLEXERIL) 10 mg tablet TAKE 1 TABLET BY MOUTH THREE TIMES A DAY AS NEEDED FOR MUSCLE SPASMS 30 Tablet 2    nitrofurantoin, macrocrystal-monohydrate, (MACROBID) 100 mg capsule Take 1 Capsule by mouth two (2) times a day.  14 Capsule 0    ondansetron hcl (ZOFRAN) 8 mg tablet TAKE 1 TABLET BY MOUTH EVERY 8 HOURS AS NEEDED FOR NAUSEA AND VOMITING 20 Tablet 1    folic acid (FOLVITE) 1 mg tablet TAKE 1 TABLET BY MOUTH TWICE A  Tablet 1    amLODIPine (NORVASC) 5 mg tablet TAKE 1 TABLET BY MOUTH EVERY DAY FOR HIGH BLOOD PRESSURE 90 Tablet 1    pantoprazole (PROTONIX) 40 mg tablet TAKE 1 TABLET BY MOUTH EVERY DAY 90 Tablet 1    predniSONE (STERAPRED DS) 10 mg dose pack See administration instruction per 10mg dose pack 21 Tablet 0    metoprolol tartrate (LOPRESSOR) 25 mg tablet TAKE 1 TABLET BY MOUTH TWO TIMES A DAY. 180 Tablet 1    ascorbic acid (VITAMIN C PO) Take  by mouth. L-Norgest&E Estradiol-E Estrad 0.15 mg-30 mcg (84)/10 mcg (7) 3MPk Take 1 Tablet by mouth daily. dicyclomine (BENTYL) 20 mg tablet TAKE 1 TAB BY MOUTH EVERY SIX (6) HOURS AS NEEDED FOR ABDOMINAL CRAMPS. 180 Tablet 0    Aimovig Autoinjector 140 mg/mL injection INJECT 1 SYRINGE ONCE MONTHLY      albuterol (PROVENTIL HFA, VENTOLIN HFA, PROAIR HFA) 90 mcg/actuation inhaler Take 1 Puff by inhalation every four (4) hours as needed for Wheezing. Please fill whichever albuterol 'brand' covered by pt's formulary 1 Inhaler 3    eletriptan (RELPAX) 40 mg tablet Take 40 mg by mouth once as needed. may repeat in 2 hours if necessary      potassium citrate (UROCIT-K5) 5 mEq (540 mg) TbER tablet Take 5 mEq by mouth daily. 4    sodium bicarbonate 650 mg tablet TAKE 1 TABLET BY MOUTH AFTER MEALS AS DIRECTED  1    fexofenadine (ALLEGRA) 180 mg tablet Take  by mouth.        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; 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; right side of brain; 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;    NSAID induced gastritis     Peptic ulcer     Peptic ulcer disease           Objective:     Patient-Reported Vitals 3/2/2021   Patient-Reported Weight -   Patient-Reported Height -   Patient-Reported Pulse -   Patient-Reported Systolic  565   Patient-Reported Diastolic 86      General: alert, cooperative, no distress   Mental  status: normal mood, behavior, speech, dress, motor activity, and thought processes, able to follow commands   HENT: NCAT   Neck: no visualized mass   Resp: no respiratory distress   Neuro: no gross deficits   Skin: no discoloration or lesions of concern on visible areas   Psychiatric: normal affect, consistent with stated mood, no evidence of hallucinations     Additional exam findings: We discussed the expected course, resolution and complications of the diagnosis(es) in detail. Medication risks, benefits, costs, interactions, and alternatives were discussed as indicated. I advised her to contact the office if her condition worsens, changes or fails to improve as anticipated. She expressed understanding with the diagnosis(es) and plan. Alberto Shaw, who was evaluated through a synchronous (real-time) audio-video encounter and/or her healthcare decision maker, is aware that it is a billable service, with coverage as determined by her insurance carrier. She provided verbal consent to proceed: Yes, and patient identification was verified. It was conducted pursuant to the emergency declaration under the [address] [place] [address] authority and the [employer] and [employer] General Act. A caregiver was present when appropriate. Ability to conduct physical exam was limited. I was at home. The patient was in the office.       Cecil Osborne NP

## 2023-02-09 NOTE — PROGRESS NOTES
Halima August (: 1990) is a 28 y.o. female, established patient, here for evaluation of the following chief complaint(s):   Follow-up, UTI, and Peripheral Neuropathy       ASSESSMENT/PLAN:  Below is the assessment and plan developed based on review of pertinent history, labs, studies, and medications. 1. Hospital discharge follow-up  -     [place] [address] MEDS RECONCILED W/CURRENT MED LIST  2. Urinary tract infection without hematuria, site unspecified  3. Hypertension, unspecified type  4. Myalgia  -     gabapentin (NEURONTIN) 100 mg capsule; Take 1 Capsule by mouth three (3) times daily. Max Daily Amount: 300 mg. Indications: neuropathic pain, Normal, Disp-90 Capsule, R-0  5. Neuropathy  -     gabapentin (NEURONTIN) 100 mg capsule; Take 1 Capsule by mouth three (3) times daily. Max Daily Amount: 300 mg. Indications: neuropathic pain, Normal, Disp-90 Capsule, R-0  Patient was seen by virtual video. Patient reports being at [facility] for 2 weeks with a diagnosis of lactic acidosis. She previously was treated for UTI outpatient. This may be because the UTI did not get better and she became more sick. Medications were reconciled. There were some changes with her metoprolol in the hospital up to 37 mg twice daily. We will continue this for a month. I have asked her to be seen in the office within a month to repeat routine labs and to reevaluate condition after hospitalization. She does have a Sanders catheter in place. She sees urology next week. Due to the the description of urine and catheter I feel she needs to be drinking more water, her mother was also present for visit and agrees. Patient was placed on gabapentin in the hospital due to nerve pain that she was having in her arms and legs. She said this was very helpful and the pain has returned. She was not sent home with any gabapentin.   I prescribed 30 days at a low dose and asked her to follow-up with her neurologist.  Patient is taking all of her medications as prescribed understands plan of care as well as mother ER precautions given and patient understands we will follow-up in office within 1 month. Return in 1 month (on 3/9/2023), or if symptoms worsen or fail to improve. SUBJECTIVE/OBJECTIVE:  HPI    Review of Systems     No data recorded     Physical Exam            Denisa Tenorio, was evaluated through a synchronous (real-time) audio-video encounter. The patient (or guardian if applicable) is aware that this is a billable service, which includes applicable co-pays. This Virtual Visit was conducted with patient's (and/or legal guardian's) consent. The visit was conducted pursuant to the emergency declaration under the [address] [place] [address], [address] [place] [address] authority and the "[employer]" and [employer] General Act. Patient identification was verified, and a caregiver was present when appropriate. The patient was located at: Home: [address] [facility] [address]  The provider was located at: [facility] [address] [address]       An electronic signature was used to authenticate this note.   -- Bal Álvarez NP

## 2023-02-17 ENCOUNTER — TELEPHONE (OUTPATIENT)
Dept: FAMILY MEDICINE CLINIC | Age: 33
End: 2023-02-17

## 2023-02-17 NOTE — TELEPHONE ENCOUNTER
----- Message from Zulema Tejada sent at 2/10/2023 11:50 AM EST -----  Subject: Message to Provider    QUESTIONS  Information for Provider? PT's mother called into day stating that PT came   in and said that her blood pressure was pretty low. The had an appt with   Dr. Dameon Rudolph yesterday and lowering her BP meds was discussed. Please call   back her mother Maddie Montes De Oca  ---------------------------------------------------------------------------  --------------  [id number]  [phone]; OK to leave message on voicemail  ---------------------------------------------------------------------------  --------------  SCRIPT ANSWERS  Relationship to Patient?  Self

## 2023-02-21 NOTE — TELEPHONE ENCOUNTER
I spoke to Ms. Jennifer Ervin to monitor her BP for the next couple of days and to schedule a virtual appt to follow up along with BP medications.

## 2023-02-23 ENCOUNTER — HOSPITAL ENCOUNTER (EMERGENCY)
Age: 33
Discharge: HOME | End: 2023-02-24
Attending: EMERGENCY MEDICINE
Payer: MEDICAID

## 2023-02-23 DIAGNOSIS — R19.7 ACUTE DIARRHEA: Primary | ICD-10-CM

## 2023-02-23 DIAGNOSIS — A08.4 VIRAL GASTROENTERITIS: ICD-10-CM

## 2023-02-23 PROCEDURE — 99285 EMERGENCY DEPT VISIT HI MDM: CPT

## 2023-02-23 RX ORDER — SODIUM CHLORIDE 9 MG/ML
1000 INJECTION, SOLUTION INTRAVENOUS ONCE
Status: COMPLETED | OUTPATIENT
Start: 2023-02-23 | End: 2023-02-24

## 2023-02-23 RX ORDER — ONDANSETRON 2 MG/ML
4 INJECTION INTRAMUSCULAR; INTRAVENOUS
Status: COMPLETED | OUTPATIENT
Start: 2023-02-23 | End: 2023-02-24

## 2023-02-24 ENCOUNTER — APPOINTMENT (OUTPATIENT)
Dept: CT IMAGING | Age: 33
End: 2023-02-24
Attending: EMERGENCY MEDICINE
Payer: MEDICAID

## 2023-02-24 VITALS
HEART RATE: 117 BPM | DIASTOLIC BLOOD PRESSURE: 99 MMHG | TEMPERATURE: 97.6 F | RESPIRATION RATE: 16 BRPM | SYSTOLIC BLOOD PRESSURE: 139 MMHG | OXYGEN SATURATION: 97 %

## 2023-02-24 LAB
ALBUMIN SERPL-MCNC: 3.4 G/DL (ref 3.5–5)
ALBUMIN/GLOB SERPL: 0.9 (ref 1.1–2.2)
ALP SERPL-CCNC: 58 U/L (ref 45–117)
ALT SERPL-CCNC: 33 U/L (ref 12–78)
ANION GAP SERPL CALC-SCNC: 14 MMOL/L (ref 5–15)
AST SERPL-CCNC: 21 U/L (ref 15–37)
BASOPHILS # BLD: 0 K/UL (ref 0–0.1)
BASOPHILS NFR BLD: 0 % (ref 0–1)
BILIRUB SERPL-MCNC: 0.2 MG/DL (ref 0.2–1)
BUN SERPL-MCNC: 12 MG/DL (ref 6–20)
BUN/CREAT SERPL: 23 (ref 12–20)
CALCIUM SERPL-MCNC: 9.6 MG/DL (ref 8.5–10.1)
CHLORIDE SERPL-SCNC: 104 MMOL/L (ref 97–108)
CO2 SERPL-SCNC: 22 MMOL/L (ref 21–32)
CREAT SERPL-MCNC: 0.53 MG/DL (ref 0.55–1.02)
DIFFERENTIAL METHOD BLD: ABNORMAL
EOSINOPHIL # BLD: 0.1 K/UL (ref 0–0.4)
EOSINOPHIL NFR BLD: 1 % (ref 0–7)
ERYTHROCYTE [DISTWIDTH] IN BLOOD BY AUTOMATED COUNT: 11.8 % (ref 11.5–14.5)
FLUAV AG NPH QL IA: NEGATIVE
FLUBV AG NOSE QL IA: NEGATIVE
GLOBULIN SER CALC-MCNC: 3.7 G/DL (ref 2–4)
GLUCOSE SERPL-MCNC: 97 MG/DL (ref 65–100)
HCT VFR BLD AUTO: 40.2 % (ref 35–47)
HGB BLD-MCNC: 13.6 G/DL (ref 11.5–16)
IMM GRANULOCYTES # BLD AUTO: 0.1 K/UL (ref 0–0.04)
IMM GRANULOCYTES NFR BLD AUTO: 1 % (ref 0–0.5)
LYMPHOCYTES # BLD: 1.8 K/UL (ref 0.8–3.5)
LYMPHOCYTES NFR BLD: 23 % (ref 12–49)
MCH RBC QN AUTO: 31.9 PG (ref 26–34)
MCHC RBC AUTO-ENTMCNC: 33.8 G/DL (ref 30–36.5)
MCV RBC AUTO: 94.4 FL (ref 80–99)
MONOCYTES # BLD: 0.4 K/UL (ref 0–1)
MONOCYTES NFR BLD: 5 % (ref 5–13)
NEUTS SEG # BLD: 5.3 K/UL (ref 1.8–8)
NEUTS SEG NFR BLD: 70 % (ref 32–75)
NRBC # BLD: 0 K/UL (ref 0–0.01)
NRBC BLD-RTO: 0 PER 100 WBC
PLATELET # BLD AUTO: 305 K/UL (ref 150–400)
PMV BLD AUTO: 9.8 FL (ref 8.9–12.9)
POTASSIUM SERPL-SCNC: 4.6 MMOL/L (ref 3.5–5.1)
PROT SERPL-MCNC: 7.1 G/DL (ref 6.4–8.2)
RBC # BLD AUTO: 4.26 M/UL (ref 3.8–5.2)
SARS-COV-2 RDRP RESP QL NAA+PROBE: NOT DETECTED
SODIUM SERPL-SCNC: 140 MMOL/L (ref 136–145)
SOURCE, COVRS: NORMAL
WBC # BLD AUTO: 7.7 K/UL (ref 3.6–11)

## 2023-02-24 PROCEDURE — 36415 COLL VENOUS BLD VENIPUNCTURE: CPT

## 2023-02-24 PROCEDURE — 74011000250 HC RX REV CODE- 250: Performed by: EMERGENCY MEDICINE

## 2023-02-24 PROCEDURE — 74011000636 HC RX REV CODE- 636: Performed by: EMERGENCY MEDICINE

## 2023-02-24 PROCEDURE — 87635 SARS-COV-2 COVID-19 AMP PRB: CPT

## 2023-02-24 PROCEDURE — 74177 CT ABD & PELVIS W/CONTRAST: CPT

## 2023-02-24 PROCEDURE — 74011250636 HC RX REV CODE- 250/636: Performed by: EMERGENCY MEDICINE

## 2023-02-24 PROCEDURE — 85025 COMPLETE CBC W/AUTO DIFF WBC: CPT

## 2023-02-24 PROCEDURE — 80053 COMPREHEN METABOLIC PANEL: CPT

## 2023-02-24 PROCEDURE — 74011250637 HC RX REV CODE- 250/637: Performed by: EMERGENCY MEDICINE

## 2023-02-24 PROCEDURE — 87804 INFLUENZA ASSAY W/OPTIC: CPT

## 2023-02-24 PROCEDURE — 96374 THER/PROPH/DIAG INJ IV PUSH: CPT

## 2023-02-24 RX ORDER — DICYCLOMINE HYDROCHLORIDE 10 MG/5ML
20 SOLUTION ORAL EVERY 6 HOURS
Qty: 200 ML | Refills: 0 | Status: CANCELLED | OUTPATIENT
Start: 2023-02-24 | End: 2023-03-01

## 2023-02-24 RX ORDER — DICYCLOMINE HYDROCHLORIDE 10 MG/ML
20 INJECTION INTRAMUSCULAR
Status: DISCONTINUED | OUTPATIENT
Start: 2023-02-24 | End: 2023-02-24 | Stop reason: HOSPADM

## 2023-02-24 RX ORDER — ONDANSETRON 4 MG/1
4 TABLET, FILM COATED ORAL
Qty: 20 TABLET | Refills: 0 | Status: SHIPPED | OUTPATIENT
Start: 2023-02-24

## 2023-02-24 RX ORDER — ACETAMINOPHEN 325 MG/1
650 TABLET ORAL
Status: COMPLETED | OUTPATIENT
Start: 2023-02-24 | End: 2023-02-24

## 2023-02-24 RX ORDER — HYDROCODONE BITARTRATE AND ACETAMINOPHEN 5; 325 MG/1; MG/1
1 TABLET ORAL
Status: COMPLETED | OUTPATIENT
Start: 2023-02-24 | End: 2023-02-24

## 2023-02-24 RX ORDER — FAMOTIDINE 20 MG/1
20 TABLET, FILM COATED ORAL 2 TIMES DAILY
Qty: 20 TABLET | Refills: 0 | Status: SHIPPED | OUTPATIENT
Start: 2023-02-24 | End: 2023-03-06

## 2023-02-24 RX ADMIN — SODIUM CHLORIDE 1000 ML: 9 INJECTION, SOLUTION INTRAVENOUS at 00:08

## 2023-02-24 RX ADMIN — ACETAMINOPHEN 650 MG: 325 TABLET ORAL at 01:18

## 2023-02-24 RX ADMIN — ONDANSETRON 4 MG: 2 INJECTION INTRAMUSCULAR; INTRAVENOUS at 00:08

## 2023-02-24 RX ADMIN — IOPAMIDOL 100 ML: 755 INJECTION, SOLUTION INTRAVENOUS at 00:27

## 2023-02-24 RX ADMIN — ALUMINUM HYDROXIDE, MAGNESIUM HYDROXIDE, AND SIMETHICONE 40 ML: 200; 200; 20 SUSPENSION ORAL at 01:21

## 2023-02-24 RX ADMIN — HYDROCODONE BITARTRATE AND ACETAMINOPHEN 1 TABLET: 5; 325 TABLET ORAL at 01:19

## 2023-02-24 NOTE — ED NOTES
The patient was discharged home in stable condition. The patient is alert and oriented, in no respiratory distress and discharge vital signs obtained. The patient's diagnosis, condition and treatment were explained. The patient expressed understanding. Prescriptions e-scribed to pharmacy. No work/school note given. A discharge plan has been developed. A  was not involved in the process. Aftercare instructions were given. Pt discharged from the ED via stretcher with [facility].

## 2023-02-24 NOTE — ED TRIAGE NOTES
Pt is brought in via EMS. Pt states that around 0400 she started having multiple spells of N/V. Pt was seen earlier today by her home health nurse and she seemed to be doing a little better but then later on today she continued to have N/V without any improvement.   Pt also complains of diarrhea

## 2023-02-25 NOTE — ED PROVIDER NOTES
History of Present Illness:  Pt is brought in via EMS. Pt states that around 0400 she started having multiple spells of N/V. Pt was seen earlier today by her home health nurse and she seemed to be doing a little better but then later on today she continued to have N/V without any improvement.   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; right side of brain; 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;    NSAID induced gastritis     Peptic ulcer     Peptic ulcer disease        Past Surgical History:   Procedure Laterality Date    HX CHOLECYSTECTOMY      HX FEMUR FRACTURE TX           Family History:   Problem Relation Age of Onset    Hypertension Mother        Social History     Socioeconomic History    Marital status: SINGLE     Spouse name: Not on file    Number of children: Not on file    Years of education: Not on file    Highest education level: Not on file   Occupational History    Not on file   Tobacco Use    Smoking status: Never    Smokeless tobacco: Never   Vaping Use    Vaping Use: Never used   Substance and Sexual Activity    Alcohol use: No    Drug use: No    Sexual activity: Not on file   Other Topics Concern    Not on file   Social History Narrative    ** Merged History Encounter **          Social Determinants of Health     Financial Resource Strain: Not on file   Food Insecurity: Not on file   Transportation Needs: Not on file   Physical Activity: Not on file   Stress: Not on file   Social Connections: Not on file   Intimate Partner Violence: Not on file   Housing Stability: Not on file         ALLERGIES: Ajovy autoinjector [fremanezumab-vfrm], Ibuprofen cold [pseudoephedrine-ibuprofen], Lodine [etodolac], Naprosyn [naproxen], Nsaids (non-steroidal anti-inflammatory drug), and Topamax [topiramate]    Review of Systems   Gastrointestinal:  Positive for nausea and vomiting. All other systems reviewed and are negative. Vitals:    02/23/23 2230 02/24/23 0155 02/24/23 0230   BP: (!) 125/99 (!) 141/102 (!) 139/99   Pulse: (!) 127 (!) 120 (!) 117   Resp: 18 16 16   Temp: 98.3 °F (36.8 °C) 97.7 °F (36.5 °C) 97.6 °F (36.4 °C)   SpO2: 96% 97% 97%            Physical Exam  Vitals and nursing note reviewed. Constitutional:       General: She is not in acute distress. Appearance: She is obese. She is not ill-appearing, toxic-appearing or diaphoretic. HENT:      Head: Normocephalic and atraumatic. Right Ear: External ear normal.      Left Ear: External ear normal.      Nose: Nose normal. No congestion or rhinorrhea. Mouth/Throat:      Mouth: Mucous membranes are moist.      Pharynx: Oropharynx is clear. No oropharyngeal exudate or posterior oropharyngeal erythema. Eyes:      General: No scleral icterus. Extraocular Movements: Extraocular movements intact. Conjunctiva/sclera: Conjunctivae normal.   Cardiovascular:      Rate and Rhythm: Regular rhythm. Tachycardia present. Pulses: Normal pulses. Heart sounds: Normal heart sounds. No murmur heard. No gallop. Pulmonary:      Effort: Pulmonary effort is normal. No respiratory distress. Breath sounds: Normal breath sounds. Abdominal:      General: Abdomen is flat. Bowel sounds are normal. There is no distension. Palpations: Abdomen is soft. Tenderness: There is abdominal tenderness. There is no guarding or rebound. Musculoskeletal:         General: No swelling, tenderness or deformity. Normal range of motion. Cervical back: Normal range of motion and neck supple. No rigidity or tenderness. Right lower leg: No edema. Left lower leg: No edema. Lymphadenopathy:      Cervical: No cervical adenopathy. Skin:     General: Skin is warm.       Capillary Refill: Capillary refill takes less than 2 seconds. Coloration: Skin is not jaundiced. Findings: No bruising or erythema. Neurological:      Mental Status: She is alert and oriented to person, place, and time. Mental status is at baseline. Cranial Nerves: Cranial nerve deficit present. Motor: Weakness present. Coordination: Coordination abnormal.   Psychiatric:         Mood and Affect: Mood normal.         Thought Content: Thought content normal.        Recent Results (from the past 72 hour(s))   CBC WITH AUTOMATED DIFF    Collection Time: 02/24/23 12:10 AM   Result Value Ref Range    WBC 7.7 3.6 - 11.0 K/uL    RBC 4.26 3.80 - 5.20 M/uL    HGB 13.6 11.5 - 16.0 g/dL    HCT 40.2 35.0 - 47.0 %    MCV 94.4 80.0 - 99.0 FL    MCH 31.9 26.0 - 34.0 PG    MCHC 33.8 30.0 - 36.5 g/dL    RDW 11.8 11.5 - 14.5 %    PLATELET 852 296 - 602 K/uL    MPV 9.8 8.9 - 12.9 FL    NRBC 0.0 0.0  WBC    ABSOLUTE NRBC 0.00 0.00 - 0.01 K/uL    NEUTROPHILS 70 32 - 75 %    LYMPHOCYTES 23 12 - 49 %    MONOCYTES 5 5 - 13 %    EOSINOPHILS 1 0 - 7 %    BASOPHILS 0 0 - 1 %    IMMATURE GRANULOCYTES 1 (H) 0 - 0.5 %    ABS. NEUTROPHILS 5.3 1.8 - 8.0 K/UL    ABS. LYMPHOCYTES 1.8 0.8 - 3.5 K/UL    ABS. MONOCYTES 0.4 0.0 - 1.0 K/UL    ABS. EOSINOPHILS 0.1 0.0 - 0.4 K/UL    ABS. BASOPHILS 0.0 0.0 - 0.1 K/UL    ABS. IMM. GRANS. 0.1 (H) 0.00 - 0.04 K/UL    DF AUTOMATED     METABOLIC PANEL, COMPREHENSIVE    Collection Time: 02/24/23 12:10 AM   Result Value Ref Range    Sodium 140 136 - 145 mmol/L    Potassium 4.6 3.5 - 5.1 mmol/L    Chloride 104 97 - 108 mmol/L    CO2 22 21 - 32 mmol/L    Anion gap 14 5 - 15 mmol/L    Glucose 97 65 - 100 mg/dL    BUN 12 6 - 20 MG/DL    Creatinine 0.53 (L) 0.55 - 1.02 MG/DL    BUN/Creatinine ratio 23 (H) 12 - 20      eGFR >60 >60 ml/min/1.73m2    Calcium 9.6 8.5 - 10.1 MG/DL    Bilirubin, total 0.2 0.2 - 1.0 MG/DL    ALT (SGPT) 33 12 - 78 U/L    AST (SGOT) 21 15 - 37 U/L    Alk.  phosphatase 58 45 - 117 U/L    Protein, total 7.1 6.4 - 8.2 g/dL    Albumin 3.4 (L) 3.5 - 5.0 g/dL    Globulin 3.7 2.0 - 4.0 g/dL    A-G Ratio 0.9 (L) 1.1 - 2.2     COVID-19 RAPID TEST    Collection Time: 02/24/23 12:11 AM   Result Value Ref Range    Specimen source Nasopharyngeal      COVID-19 rapid test Not detected NOTD     INFLUENZA A+B VIRAL AGS    Collection Time: 02/24/23 12:11 AM   Result Value Ref Range    Influenza A Antigen Negative NEG      Influenza B Antigen Negative NEG         CT ABD PELV W CONT   Final Result   Nonobstructing bilateral nephrolithiasis and incidentals as above. No acute findings or findings to correlate with abdominal pain otherwise. Medical Decision Making  Considered COVID, flu, UTI, pyelonephritis, dehydration, sepsis, kidney stone, musculoskeletal back pain, anemia, cholecystitis, retained stone, pancreatitis, hepatitis and other causes    Problems Addressed:  Acute diarrhea: acute illness or injury with systemic symptoms  Viral gastroenteritis: acute illness or injury with systemic symptoms    Amount and/or Complexity of Data Reviewed  Independent Historian: parent  External Data Reviewed: labs and notes. Details: Prior visits for UTI, spasms, abdominal pain, right shoulder injury and UTI  Labs: ordered. Details: CBC, chemistries, liver functions were all normal. The patient adamantly denied any urinary symptoms and therefore I felt it was unlikely that she had a urinary tract infection. She agreed with me stating that she normally does have quite significant symptoms if she has a urinary tract infection and she said no such symptoms at this time. Radiology: ordered. Details: No acute process on CT abdomen pelvis    Risk  OTC drugs. Prescription drug management. Decision regarding hospitalization. Risk Details: The patient does not warrant acute hospitalization and although she is tachycardic this is apparently her baseline heart rate. She is not hypotensive in fact she has had some hypertension. Overall she states she is feeling fine and I can see no indication for further work-up at this time. Although she did not provide a urine sample the fact that she has no symptoms of UTI left main unconcerned with that. ED Course as of 02/25/23 1633   Fri Feb 24, 2023   0159 Pulse (Heart Rate)(!): 127 [MN]   0159 Pulse (Heart Rate)(!): 120  The patient states that her heart rate is normally elevated [MN]      ED Course User Index  [MN] Viktor Doyle MD       Procedures    Medications   ondansetron [facility] injection 4 mg (4 mg IntraVENous Given 2/24/23 0008)   0.9% sodium chloride infusion 1,000 mL (0 mL IntraVENous IV Completed 2/24/23 0200)   iopamidoL (ISOVUE-370) 370 mg iodine /mL (76 %) injection 100 mL (100 mL IntraVENous Given 2/24/23 0027)   acetaminophen (TYLENOL) tablet 650 mg (650 mg Oral Given 2/24/23 0118)   HYDROcodone-acetaminophen (NORCO) 5-325 mg per tablet 1 Tablet (1 Tablet Oral Given 2/24/23 0119)   mylanta/viscous lidocaine (GI COCKTAIL) (40 mL Oral Given 2/24/23 0121)          My Medications        START taking these medications        Instructions Each Dose to Equal Morning Noon Evening Bedtime   famotidine 20 mg tablet  Commonly known as: Pepcid    Your last dose was: Your next dose is: Take 1 Tablet by mouth two (2) times a day for 10 days. 20 mg                 Lactobacillus acidophilus Cap  Commonly known as: BACID    Your last dose was: Your next dose is: Take 2 Capsules by mouth two (2) times a day for 30 days. 2 Capsule                        CHANGE how you take these medications        Instructions Each Dose to Equal Morning Noon Evening Bedtime   ondansetron hcl 4 mg tablet  Commonly known as: Zofran  What changed:   medication strength  See the new instructions. Your last dose was: Your next dose is: Take 1 Tablet by mouth every eight (8) hours as needed for Nausea or Vomiting.    4 mg                        ASK your doctor about these medications        Instructions Each Dose to Equal Morning Noon Evening Bedtime   Aimovig Autoinjector 140 mg/mL injection  Generic drug: erenumab-aooe    Your last dose was: Your next dose is:         INJECT 1 SYRINGE ONCE MONTHLY                  albuterol 90 mcg/actuation inhaler  Commonly known as: PROVENTIL HFA, VENTOLIN HFA, PROAIR HFA    Your last dose was: Your next dose is: Take 1 Puff by inhalation every four (4) hours as needed for Wheezing. Please fill whichever albuterol 'brand' covered by pt's formulary   1 Puff                 amLODIPine 5 mg tablet  Commonly known as: NORVASC    Your last dose was: Your next dose is:         TAKE 1 TABLET BY MOUTH EVERY DAY FOR HIGH BLOOD PRESSURE                  cyclobenzaprine 10 mg tablet  Commonly known as: FLEXERIL    Your last dose was: Your next dose is:         TAKE 1 TABLET BY MOUTH THREE TIMES A DAY AS NEEDED FOR MUSCLE SPASMS                  dicyclomine 20 mg tablet  Commonly known as: BENTYL    Your last dose was: Your next dose is:         TAKE 1 TAB BY MOUTH EVERY SIX (6) HOURS AS NEEDED FOR ABDOMINAL CRAMPS. 20 mg                 eletriptan 40 mg tablet  Commonly known as: RELPAX    Your last dose was: Your next dose is: Take 40 mg by mouth once as needed. may repeat in 2 hours if necessary   40 mg                 fexofenadine 180 mg tablet  Commonly known as: ALLEGRA    Your last dose was: Your next dose is: Take  by mouth. folic acid 1 mg tablet  Commonly known as: FOLVITE    Your last dose was: Your next dose is:         TAKE 1 TABLET BY MOUTH TWICE A DAY                  gabapentin 100 mg capsule  Commonly known as: NEURONTIN    Your last dose was: Your next dose is: Take 1 Capsule by mouth three (3) times daily. Max Daily Amount: 300 mg.  Indications: neuropathic pain   100 mg                 L-Norgest&E Estradiol-E Estrad 0.15 mg-30 mcg (84)/10 mcg (7) 3mpk    Your last dose was: Your next dose is: Take 1 Tablet by mouth daily. 1 Tablet                 methocarbamoL 500 mg tablet  Commonly known as: ROBAXIN    Your last dose was: Your next dose is: Take 500 mg by mouth three (3) times daily. 500 mg                 metoprolol tartrate 25 mg tablet  Commonly known as: LOPRESSOR    Your last dose was: Your next dose is:         TAKE 1 TABLET BY MOUTH TWO TIMES A DAY. nitrofurantoin (macrocrystal-monohydrate) 100 mg capsule  Commonly known as: MACROBID    Your last dose was: Your next dose is: Take 1 Capsule by mouth two (2) times a day. 100 mg                 pantoprazole 40 mg tablet  Commonly known as: PROTONIX    Your last dose was: Your next dose is:         TAKE 1 TABLET BY MOUTH EVERY DAY                  potassium citrate 5 mEq (540 mg) Tber tablet  Commonly known as: UROCIT-K5    Your last dose was: Your next dose is: Take 5 mEq by mouth daily. 5 mEq                 predniSONE 10 mg dose pack  Commonly known as: STERAPRED DS    Your last dose was: Your next dose is:         See administration instruction per 10mg dose pack                  sodium bicarbonate 650 mg tablet    Your last dose was: Your next dose is:         TAKE 1 TABLET BY MOUTH AFTER MEALS AS DIRECTED                  VITAMIN C PO    Your last dose was: Your next dose is: Take  by mouth. Where to Get Your Medications        These medications were sent to [facility]pharmacy [id number], [place] - [address]  [facility], [address] [place] [address]      Hours: 24-hours Phone: [phone]   famotidine 20 mg tablet  Lactobacillus acidophilus Cap  ondansetron hcl 4 mg tablet         Encounter Diagnoses     ICD-10-CM ICD-9-CM   1. Acute diarrhea  R19.7 787.91   2.  Viral gastroenteritis  A08.4 008.8       Follow-up Information       Follow up With Specialties Details Why Contact Info    Mila Kimbrough NP Family Nurse Practitioner, Nurse Practitioner  As needed [address]      [facility] EMERGENCY DEPT Emergency Medicine  As needed, If symptoms worsen [facility] [address]  [phone]            DISPOSITION:  Discharged

## 2023-03-17 DIAGNOSIS — M79.10 MYALGIA: ICD-10-CM

## 2023-03-17 DIAGNOSIS — G62.9 NEUROPATHY: ICD-10-CM

## 2023-03-17 RX ORDER — GABAPENTIN 100 MG/1
100 CAPSULE ORAL 3 TIMES DAILY
Qty: 90 CAPSULE | Refills: 0 | Status: SHIPPED | OUTPATIENT
Start: 2023-03-17

## 2023-03-28 DIAGNOSIS — R52 PAIN: ICD-10-CM

## 2023-03-28 DIAGNOSIS — M62.838 MUSCLE SPASM: ICD-10-CM

## 2023-03-28 RX ORDER — CYCLOBENZAPRINE HCL 10 MG
TABLET ORAL
Qty: 30 TABLET | Refills: 2 | Status: SHIPPED | OUTPATIENT
Start: 2023-03-28 | End: 2023-03-29 | Stop reason: ALTCHOICE

## 2023-03-29 ENCOUNTER — VIRTUAL VISIT (OUTPATIENT)
Dept: FAMILY MEDICINE CLINIC | Age: 33
End: 2023-03-29
Payer: MEDICARE

## 2023-03-29 DIAGNOSIS — F41.9 ANXIETY: Primary | ICD-10-CM

## 2023-03-29 DIAGNOSIS — M62.838 MUSCLE SPASM: ICD-10-CM

## 2023-03-29 DIAGNOSIS — R11.0 NAUSEA: ICD-10-CM

## 2023-03-29 DIAGNOSIS — G62.9 NEUROPATHY: ICD-10-CM

## 2023-03-29 DIAGNOSIS — M79.10 MYALGIA: ICD-10-CM

## 2023-03-29 DIAGNOSIS — I10 HYPERTENSION, UNSPECIFIED TYPE: ICD-10-CM

## 2023-03-29 PROCEDURE — 99214 OFFICE O/P EST MOD 30 MIN: CPT | Performed by: NURSE PRACTITIONER

## 2023-03-29 RX ORDER — GABAPENTIN 100 MG/1
100 CAPSULE ORAL 3 TIMES DAILY
Qty: 90 CAPSULE | Refills: 0 | Status: SHIPPED | OUTPATIENT
Start: 2023-03-29

## 2023-03-29 RX ORDER — ONDANSETRON 4 MG/1
4 TABLET, FILM COATED ORAL
Qty: 20 TABLET | Refills: 0 | Status: SHIPPED | OUTPATIENT
Start: 2023-03-29

## 2023-03-29 RX ORDER — HYDROXYZINE HYDROCHLORIDE 10 MG/1
10 TABLET, FILM COATED ORAL
Qty: 30 TABLET | Refills: 0 | Status: SHIPPED | OUTPATIENT
Start: 2023-03-29 | End: 2023-04-08

## 2023-03-29 RX ORDER — METHOCARBAMOL 500 MG/1
500 TABLET, FILM COATED ORAL 3 TIMES DAILY
Qty: 30 TABLET | Refills: 1 | Status: SHIPPED | OUTPATIENT
Start: 2023-03-29

## 2023-03-29 NOTE — LETTER
3/29/2023 11:46 AM    Ms. Meliza [phone]    To:     Orthotics for Bilateral lower extremities to improve gait mobility.     [facility]--[phone]  Phone- [phone]  Sincerely,      Andre Mathew NP

## 2023-03-29 NOTE — PROGRESS NOTES
Mitch Carrillo (: 1990) is a 28 y.o. female, established patient, here for evaluation of the following chief complaint(s):   Follow-up       ASSESSMENT/PLAN:  Below is the assessment and plan developed based on review of pertinent history, labs, studies, and medications. 1. Anxiety  -     hydrOXYzine HCL (ATARAX) 10 mg tablet; Take 1 Tablet by mouth daily as needed for Anxiety for up to 10 days. Once daily as needed, Normal, Disp-30 Tablet, R-0    Seen by virtual video. Mother was also available during visit. Reports history of worsening anxiety. Reports this is episodic and not continuous. We discussed options of daily medication but patient declined at this time not wanting to add to her long medication list.  Hydroxyzine will be used as a as needed medication not to be used more than 1 time per day. Patient reports this is low-frequency sometimes only 2 or 3 times per week. We will continue to follow. 2. Myalgia  -     gabapentin (NEURONTIN) 100 mg capsule; Take 1 Capsule by mouth three (3) times daily. Max Daily Amount: 300 mg. Indications: neuropathic pain, Normal, Disp-90 Capsule, R-0Not to exceed 5 additional fills before 2023    Continues to have muscle spasms and muscle ache. Gabapentin has been refilled  3. Neuropathy  -     gabapentin (NEURONTIN) 100 mg capsule; Take 1 Capsule by mouth three (3) times daily. Max Daily Amount: 300 mg. Indications: neuropathic pain, Normal, Disp-90 Capsule, R-0Not to exceed 5 additional fills before 2023  4. Muscle spasm  -     methocarbamoL (ROBAXIN) 500 mg tablet; Take 1 Tablet by mouth three (3) times daily. Indications: muscle spasm, Normal, Disp-30 Tablet, R-1    Patient was recently seen in hospital after fall and was given Robaxin. She feels this works better with her muscle spasms we will discontinue Flexeril. 5. Nausea  -     ondansetron hcl (Zofran) 4 mg tablet;  Take 1 Tablet by mouth every eight (8) hours as needed for Nausea or Vomiting., Normal, Disp-20 Tablet, R-0    Occasional episodic nausea usually after medications. Advised to take medications with food  6. Hypertension, unspecified type  -     METABOLIC PANEL, COMPREHENSIVE; Future  -     TSH 3RD GENERATION; Future  Reported higher blood pressures at hospital will need to have repeat metabolic and thyroid panels completed. Discussed with patient and mother need to be seen in office for further evaluation. Discussed sedation side effects of medication, stressed that the patient needed to be in a safe place sitting or in bed and taking hydroxyzine as this will make her sleepy. No follow-ups on file. SUBJECTIVE/OBJECTIVE:  HPI    Review of Systems   Constitutional: Negative. HENT: Negative. Respiratory: Negative. Cardiovascular: Negative. Gastrointestinal:  Positive for nausea. Genitourinary: Negative. Musculoskeletal:  Positive for gait problem and myalgias. Psychiatric/Behavioral:  Positive for agitation. No data recorded     Physical Exam            Odin Salazar, was evaluated through a synchronous (real-time) audio-video encounter. The patient (or guardian if applicable) is aware that this is a billable service, which includes applicable co-pays. This Virtual Visit was conducted with patient's (and/or legal guardian's) consent. The visit was conducted pursuant to the emergency declaration under the [address] [place] [address] authority and the [employer] General Act. Patient identification was verified, and a caregiver was present when appropriate. The patient was located at: Home: [place] [facility] [address]  The provider was located at: Facility (Appt Department): [address] [address]       An electronic signature was used to authenticate this note.   -- Love Arndt NP

## 2023-04-20 ENCOUNTER — TELEPHONE (OUTPATIENT)
Dept: FAMILY MEDICINE CLINIC | Age: 33
End: 2023-04-20

## 2023-04-20 DIAGNOSIS — M79.10 MYALGIA: Primary | ICD-10-CM

## 2023-04-20 NOTE — TELEPHONE ENCOUNTER
----- Message from Dee Obrien NP sent at 4/20/2023  8:36 AM EDT -----  Regarding: FW: Outpatient physical therapy   Contact: [phone]  [facility]. Have her ask to be transferred to the [facility] location. I do not have that number. ----- Message -----  From: iJhan Feliz  Sent: 4/17/2023   3:06 PM EDT  To: Dee Obrien NP  Subject: RE: Outpatient physical therapy                  fyi    ----- Message -----  From: Sonal Nolen  Sent: 4/17/2023   2:13 PM EDT  To: [facility] Nurses  Subject: Outpatient physical therapy                      I have been doing home pt. They have gone out of business. My physical therapist recommended outpatient services. They have much more equipment to work with. Lucy Worthington is aware of whats going on.

## 2023-04-21 DIAGNOSIS — M62.838 MUSCLE SPASM: ICD-10-CM

## 2023-04-21 RX ORDER — METHOCARBAMOL 500 MG/1
500 TABLET, FILM COATED ORAL 3 TIMES DAILY
Qty: 30 TABLET | Refills: 1 | Status: SHIPPED | OUTPATIENT
Start: 2023-04-21

## 2023-04-26 ENCOUNTER — VIRTUAL VISIT (OUTPATIENT)
Dept: FAMILY MEDICINE CLINIC | Age: 33
End: 2023-04-26
Payer: MEDICARE

## 2023-04-26 DIAGNOSIS — M62.838 MUSCLE SPASM: ICD-10-CM

## 2023-04-26 PROCEDURE — G8419 CALC BMI OUT NRM PARAM NOF/U: HCPCS | Performed by: NURSE PRACTITIONER

## 2023-04-26 PROCEDURE — G8432 DEP SCR NOT DOC, RNG: HCPCS | Performed by: NURSE PRACTITIONER

## 2023-04-26 PROCEDURE — 99213 OFFICE O/P EST LOW 20 MIN: CPT | Performed by: NURSE PRACTITIONER

## 2023-04-26 PROCEDURE — G8427 DOCREV CUR MEDS BY ELIG CLIN: HCPCS | Performed by: NURSE PRACTITIONER

## 2023-04-26 RX ORDER — GABAPENTIN 300 MG/1
300 CAPSULE ORAL 2 TIMES DAILY
Qty: 60 CAPSULE | Refills: 1 | Status: SHIPPED | OUTPATIENT
Start: 2023-04-26

## 2023-04-26 RX ORDER — METHOCARBAMOL 500 MG/1
500 TABLET, FILM COATED ORAL 3 TIMES DAILY
Qty: 30 TABLET | Refills: 1 | Status: SHIPPED | OUTPATIENT
Start: 2023-04-26

## 2023-04-26 NOTE — PROGRESS NOTES
Inna Wilson (: 1990) is a 28 y.o. female, established patient, here for evaluation of the following chief complaint(s):   Medication Refill, Referral Follow Up, and Pain (Chronic)       ASSESSMENT/PLAN:  Below is the assessment and plan developed based on review of pertinent history, labs, studies, and medications. 1. Muscle spasm  -     gabapentin (NEURONTIN) 300 mg capsule; Take 1 Capsule by mouth two (2) times a day. Max Daily Amount: 600 mg. Indications: neuropathic pain, Normal, Disp-60 Capsule, R-1  -     methocarbamoL (ROBAXIN) 500 mg tablet; Take 1 Tablet by mouth three (3) times daily. Indications: muscle spasm, Normal, Disp-30 Tablet, R-1  -     REFERRAL TO PAIN MANAGEMENT  Patient was seen by virtual video. Patient is going to outpatient physical therapy. She has continued neuropathic type pain down her side. We will increase her gabapentin from 300 mg daily total dose to 600 mg daily total dose. She will take 300 mg twice daily. We will refill Robaxin for muscle spasms. Referring the patient to pain management and patient also has an appointment with neurology in another month. I feel that I will be best to further evaluate her pain situation and better controlled. Patient's mother was on call also agree with plan of care patient tolerated previous gabapentin with no problems discussed possible sedation from the increased dose follow-up as needed. No follow-ups on file. SUBJECTIVE/OBJECTIVE:  HPI    Review of Systems   Constitutional: Negative. HENT: Negative. Respiratory: Negative. Cardiovascular: Negative. Genitourinary: Negative. Musculoskeletal:  Positive for myalgias. Neurological:         Nerve pain   Psychiatric/Behavioral:  The patient is nervous/anxious. No data recorded     Physical Exam            Inna Wilson, was evaluated through a synchronous (real-time) audio-video encounter.  The patient (or guardian if applicable) is aware that this is a billable service, which includes applicable co-pays. This Virtual Visit was conducted with patient's (and/or legal guardian's) consent. The visit was conducted pursuant to the emergency declaration under the [facility], [address] [place] [address] authority and the [employer] and [employer] General Act. Patient identification was verified, and a caregiver was present when appropriate. The patient was located at: Home: [address] [place] [phone]  The provider was located at: [facility] [address] [place] [address]       An electronic signature was used to authenticate this note.   -- Thor Longoria NP

## 2023-05-05 DIAGNOSIS — R11.0 NAUSEA: ICD-10-CM

## 2023-05-09 DIAGNOSIS — R11.0 NAUSEA: ICD-10-CM

## 2023-05-09 RX ORDER — ONDANSETRON HYDROCHLORIDE 8 MG/1
TABLET, FILM COATED ORAL
Qty: 20 TABLET | Refills: 1 | OUTPATIENT
Start: 2023-05-09

## 2023-05-09 RX ORDER — ONDANSETRON 4 MG/1
TABLET, FILM COATED ORAL
Qty: 20 TABLET | OUTPATIENT
Start: 2023-05-09

## 2023-05-30 ENCOUNTER — TELEPHONE (OUTPATIENT)
Facility: CLINIC | Age: 33
End: 2023-05-30

## 2023-05-30 DIAGNOSIS — M62.838 OTHER MUSCLE SPASM: ICD-10-CM

## 2023-05-30 RX ORDER — METHOCARBAMOL 500 MG/1
TABLET, FILM COATED ORAL
Qty: 30 TABLET | Refills: 1 | Status: SHIPPED | OUTPATIENT
Start: 2023-05-30

## 2023-05-30 NOTE — TELEPHONE ENCOUNTER
Pt mother [facility]. Pt mother will get location to fax over request for provider to evaluate.

## 2023-06-21 ENCOUNTER — TELEPHONE (OUTPATIENT)
Facility: CLINIC | Age: 33
End: 2023-06-21

## 2023-06-21 NOTE — TELEPHONE ENCOUNTER
Pt's mother called stating the pt is having urinary infection and they are unable to find a sooner appt than the 11th of July. [facility] on May 21st.     Please advice.

## 2023-06-28 DIAGNOSIS — I10 ESSENTIAL (PRIMARY) HYPERTENSION: ICD-10-CM

## 2023-06-28 DIAGNOSIS — M62.838 OTHER MUSCLE SPASM: ICD-10-CM

## 2023-06-30 RX ORDER — AMLODIPINE BESYLATE 5 MG/1
TABLET ORAL
Qty: 90 TABLET | Refills: 1 | Status: SHIPPED | OUTPATIENT
Start: 2023-06-30

## 2023-06-30 RX ORDER — METHOCARBAMOL 500 MG/1
TABLET, FILM COATED ORAL
Qty: 30 TABLET | Refills: 1 | Status: SHIPPED | OUTPATIENT
Start: 2023-06-30

## 2023-07-06 NOTE — PROGRESS NOTES
Sondra Alicea  27 y.o. female  1990  GLT:[id number]    [facility] FAMILY MEDICINE  Progress Note     Encounter Date: 3/2/2021    Sondra Alicea is a 27 y.o. female who was seen by synchronous (real-time) audio-video technology on 3/2/2021. She and/or her healthcare decision maker is aware that this patient-initiated Telehealth encounter is a billable service, with coverage as determined by her insurance carrier. She  is aware that she may receive a bill and has provided verbal consent to proceed:Yes    I was at home while conducting this encounter. . The patient was at home during the encounter accompanied by her mother. .   This visit was completed virtually using Doxy. me  Assessment and Plan:     Encounter Diagnoses     ICD-10-CM ICD-9-CM   1. Pain  R52 780.96   2. Muscle spasm  M62.838 728.85   3. Tachycardia  R00.0 785.0   4. Essential hypertension  I10 401.9       1. Pain  2. Muscle spasm  Patient has been progressing with [facility] PT/OT and is looking to switch to outpatient. - cyclobenzaprine (FLEXERIL) 5 mg tablet; Take 1 Tab by mouth three (3) times daily as needed for Muscle Spasm(s). Dispense: 90 Tab; Refill: 2  - REFERRAL TO PHYSICAL THERAPY    3. Tachycardia  4. Essential hypertension  Well controlled. Continue current medication. - carvediloL (Coreg) 25 mg tablet; Take 1 Tab by mouth every morning AND 0.5 Tabs every evening. Dispense: 135 Tab; Refill: 1      We discussed the expected course, resolution and complications of the diagnosis(es) in detail. Medication risks, benefits, costs, interactions, and alternatives were discussed as indicated. I advised her to contact the office if her condition worsens, changes or fails to improve as anticipated. She expressed understanding with the diagnosis(es) and plan.      CPT Codes 04476-18395 for Established Patients may apply to this Telehealth Visit    Pursuant to the emergency declaration under the [address] Emergencies Act, 1135 waiver authority and the Coronavirus Preparedness and Response Supplemental Appropriations Act, this Virtual  Visit was conducted, with patient's consent, to reduce the patient's risk of exposure to COVID-19 and provide continuity of care for an established patient. Services were provided through a video synchronous discussion virtually to substitute for in-person clinic visit. Electronically Signed: Mosie Snellen, MD    Current Medications after this visit     Current Outpatient Medications   Medication Sig    cyclobenzaprine (FLEXERIL) 5 mg tablet Take 1 Tab by mouth three (3) times daily as needed for Muscle Spasm(s).  carvediloL (Coreg) 25 mg tablet Take 1 Tab by mouth every morning AND 0.5 Tabs every evening.  dicyclomine (BENTYL) 20 mg tablet Take 1 Tab by mouth every six (6) hours as needed for Abdominal Cramps.  pantoprazole (PROTONIX) 40 mg tablet Take 1 Tab by mouth daily.  folic acid (FOLVITE) 1 mg tablet Take 1 Tab by mouth two (2) times a day.  Aimovig Autoinjector 140 mg/mL injection INJECT 1 SYRINGE ONCE MONTHLY    ondansetron hcl (Zofran) 4 mg tablet Take 4 mg by mouth every eight (8) hours as needed for Nausea or Vomiting.  albuterol (PROVENTIL HFA, VENTOLIN HFA, PROAIR HFA) 90 mcg/actuation inhaler Take 1 Puff by inhalation every four (4) hours as needed for Wheezing. Please fill whichever albuterol 'brand' covered by pt's formulary    eletriptan (Relpax) 40 mg tablet Take 40 mg by mouth once as needed. may repeat in 2 hours if necessary    potassium citrate (UROCIT-K5) 5 mEq (540 mg) TbER tablet Take 5 mEq by mouth daily.  sodium bicarbonate 650 mg tablet TAKE 1 TABLET BY MOUTH AFTER MEALS AS DIRECTED    fexofenadine (ALLEGRA) 180 mg tablet Take  by mouth. No current facility-administered medications for this visit.       Medications Discontinued During This Encounter   Medication Reason    cyclobenzaprine (FLEXERIL) 5 mg tablet REORDER    carvediloL (Coreg) 25 mg tablet REORDER     ~~~~~~~~~~~~~~~~~~~~~~~~~~~~~~~~~~~~~~~~~~~~~~    Chief Complaint   Patient presents with    Hypertension    Referral / Consult     referral to PT/OT with [facility]. History of Present Illness   Lubna Waddell is a 27 y.o. female who presents for:    Hypertension: Controlled   BP Readings from Last 3 Encounters:   09/27/19 121/88   10/16/18 110/78   09/06/18 108/74     The patient reports:  taking medications as instructed, no medication side effects noted, no TIA's, no chest pain on exertion, no dyspnea on exertion, no swelling of ankles. Home monitoring:Yes: Comment: see patietn vitals      Muscle spasms  Patient present with cc of muscle spasms. Patient has been working with [facility] and been increasing exercise. States that she has been using cyclobenzaprine with good reduction in pain. Review of Systems   Review of Systems   Musculoskeletal: Positive for myalgias. Neurological: Positive for weakness. Negative for dizziness, focal weakness, seizures, loss of consciousness and headaches. Vitals/Objective:     Due to this being a TeleHealth evaluation, many elements of the physical examination are unable to be assessed. Physical Exam  Constitutional:       General: She is not in acute distress. Appearance: Normal appearance. She is not ill-appearing. HENT:      Head: Normocephalic and atraumatic. Right Ear: External ear normal.      Left Ear: External ear normal.      Mouth/Throat:      Mouth: Mucous membranes are moist.   Eyes:      General:         Right eye: No discharge. Left eye: No discharge. Extraocular Movements: Extraocular movements intact. Conjunctiva/sclera: Conjunctivae normal.   Neck:      Musculoskeletal: Normal range of motion.    Pulmonary:      Effort: Pulmonary effort is normal.      Comments: No visualized signs of difficulty breathing or respiratory distress  Skin:     Coloration: This was a shared visit with the KARTIK. I reviewed and verified the documentation and independently performed the documented: Skin is not pale. Findings: No erythema or rash. Neurological:      Mental Status: She is alert and oriented to person, place, and time. Cranial Nerves: No cranial nerve deficit ( No Facial Asymmetry (Cranial nerve 7 motor function) (limited exam due to video visit) ). Comments: Able to follow commands    Psychiatric:         Mood and Affect: Mood normal.         Behavior: Behavior normal.          No results found for this or any previous visit (from the past 24 hour(s)). Disposition     No future appointments. History   Patient's past medical, surgical and family histories were reviewed and updated.     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;    NSAID induced gastritis     Peptic ulcer     Peptic ulcer disease      Past Surgical History:   Procedure Laterality Date    HX CHOLECYSTECTOMY      HX FEMUR FRACTURE TX       Family History   Problem Relation Age of Onset    Hypertension Mother      Social History     Tobacco Use    Smoking status: Never Smoker    Smokeless tobacco: Never Used   Substance Use Topics    Alcohol use: No    Drug use: No       Allergies     Allergies   Allergen Reactions    Ajovy Autoinjector [Fremanezumab-Vfrm] Shortness of Breath, Cough and Vertigo    Ibuprofen Cold [Pseudoephedrine-Ibuprofen] Other (comments)    Lodine [Etodolac] Other (comments)     Headache      Naprosyn [Naproxen] Other (comments)    Nsaids (Non-Steroidal Anti-Inflammatory Drug) Nausea and Vomiting    Topamax [Topiramate] Other (comments)     Abdominal pain, Lactic Acidosis

## 2023-07-24 DIAGNOSIS — M62.838 OTHER MUSCLE SPASM: ICD-10-CM

## 2023-07-25 DIAGNOSIS — M62.838 OTHER MUSCLE SPASM: ICD-10-CM

## 2023-07-26 ENCOUNTER — TELEPHONE (OUTPATIENT)
Facility: CLINIC | Age: 33
End: 2023-07-26

## 2023-07-26 NOTE — TELEPHONE ENCOUNTER
FANI Valdez - NP  Patient needs appointment  Last seen 4/26/2023 virtual  Medication filled on 4/26/2023 by Adela Hardy NP  Virtual Visit on 04/01/2022   Component Date Value Ref Range Status    Hemoglobin A1C 04/12/2022 4.7 (L)  4.8 - 5.6 % Final    Comment:          Prediabetes: 5.7 - 6.4           Diabetes: >6.4           Glycemic control for adults with diabetes: <7.0      eAG 04/12/2022 88  mg/dL Final    Cholesterol, Total 04/12/2022 179  100 - 199 mg/dL Final    Triglycerides 04/12/2022 246 (H)  0 - 149 mg/dL Final    HDL 04/12/2022 28 (L)  >39 mg/dL Final    VLDL 04/12/2022 43 (H)  5 - 40 mg/dL Final    LDL Calculated 04/12/2022 108 (H)  0 - 99 mg/dL Final     Health Maintenance Due   Topic Date Due    Varicella vaccine (1 of 2 - 2-dose childhood series) Never done    HIV screen  Never done    Hepatitis C screen  Never done    DTaP/Tdap/Td vaccine (1 - Tdap) Never done    Cervical cancer screen  Never done    COVID-19 Vaccine (3 - Booster for [place] series) 07/16/2021

## 2023-07-26 NOTE — TELEPHONE ENCOUNTER
Pt's mom called requesting refills on gabapentin stating she placed the request bu the pt has ran out of it this morning.      [facility] #[id number]- Jenifer Bourne, [address] [place] [address] -  [phone]

## 2023-07-27 RX ORDER — GABAPENTIN 300 MG/1
CAPSULE ORAL
Qty: 60 CAPSULE | Refills: 1 | Status: SHIPPED | OUTPATIENT
Start: 2023-07-27 | End: 2023-08-26

## 2023-07-27 RX ORDER — METHOCARBAMOL 500 MG/1
TABLET, FILM COATED ORAL
Qty: 30 TABLET | Refills: 1 | Status: SHIPPED | OUTPATIENT
Start: 2023-07-27

## 2023-08-03 RX ORDER — ONDANSETRON HYDROCHLORIDE 8 MG/1
TABLET, FILM COATED ORAL
Qty: 20 TABLET | Refills: 1 | OUTPATIENT
Start: 2023-08-03

## 2023-09-10 DIAGNOSIS — F41.9 ANXIETY DISORDER, UNSPECIFIED: ICD-10-CM

## 2023-09-10 DIAGNOSIS — M62.838 OTHER MUSCLE SPASM: ICD-10-CM

## 2023-09-16 RX ORDER — METHOCARBAMOL 500 MG/1
TABLET, FILM COATED ORAL
Qty: 30 TABLET | Refills: 1 | Status: SHIPPED | OUTPATIENT
Start: 2023-09-16

## 2023-09-16 RX ORDER — HYDROXYZINE HYDROCHLORIDE 10 MG/1
TABLET, FILM COATED ORAL
Qty: 30 TABLET | Refills: 0 | Status: SHIPPED | OUTPATIENT
Start: 2023-09-16

## 2023-09-18 ENCOUNTER — OFFICE VISIT (OUTPATIENT)
Facility: CLINIC | Age: 33
End: 2023-09-18

## 2023-09-18 VITALS
HEIGHT: 61 IN | RESPIRATION RATE: 16 BRPM | SYSTOLIC BLOOD PRESSURE: 120 MMHG | BODY MASS INDEX: 28.13 KG/M2 | TEMPERATURE: 97.8 F | HEART RATE: 89 BPM | DIASTOLIC BLOOD PRESSURE: 83 MMHG | WEIGHT: 149 LBS | OXYGEN SATURATION: 97 %

## 2023-09-18 DIAGNOSIS — I10 ESSENTIAL (PRIMARY) HYPERTENSION: Primary | ICD-10-CM

## 2023-09-18 DIAGNOSIS — Z13.9 SCREENING DUE: ICD-10-CM

## 2023-09-18 DIAGNOSIS — M62.838 OTHER MUSCLE SPASM: ICD-10-CM

## 2023-09-18 DIAGNOSIS — R11.0 NAUSEA: ICD-10-CM

## 2023-09-18 DIAGNOSIS — F41.1 GENERALIZED ANXIETY DISORDER: ICD-10-CM

## 2023-09-18 RX ORDER — HYDROXYZINE HYDROCHLORIDE 10 MG/1
10 TABLET, FILM COATED ORAL EVERY 8 HOURS PRN
Qty: 90 TABLET | Refills: 1 | Status: SHIPPED | OUTPATIENT
Start: 2023-09-18

## 2023-09-18 RX ORDER — METHOCARBAMOL 500 MG/1
TABLET, FILM COATED ORAL
Qty: 30 TABLET | Refills: 2 | Status: SHIPPED | OUTPATIENT
Start: 2023-09-18

## 2023-09-18 RX ORDER — ONDANSETRON 4 MG/1
4 TABLET, FILM COATED ORAL EVERY 8 HOURS PRN
Qty: 30 TABLET | Refills: 1 | Status: SHIPPED | OUTPATIENT
Start: 2023-09-18

## 2023-09-18 RX ORDER — GABAPENTIN 400 MG/1
400 CAPSULE ORAL 2 TIMES DAILY
COMMUNITY
Start: 2023-08-18

## 2023-09-18 RX ORDER — AMITRIPTYLINE HYDROCHLORIDE 25 MG/1
TABLET, FILM COATED ORAL
COMMUNITY
Start: 2023-09-10

## 2023-09-18 SDOH — ECONOMIC STABILITY: FOOD INSECURITY: WITHIN THE PAST 12 MONTHS, YOU WORRIED THAT YOUR FOOD WOULD RUN OUT BEFORE YOU GOT MONEY TO BUY MORE.: NEVER TRUE

## 2023-09-18 SDOH — ECONOMIC STABILITY: HOUSING INSECURITY
IN THE LAST 12 MONTHS, WAS THERE A TIME WHEN YOU DID NOT HAVE A STEADY PLACE TO SLEEP OR SLEPT IN A SHELTER (INCLUDING NOW)?: NO

## 2023-09-18 SDOH — ECONOMIC STABILITY: FOOD INSECURITY: WITHIN THE PAST 12 MONTHS, THE FOOD YOU BOUGHT JUST DIDN'T LAST AND YOU DIDN'T HAVE MONEY TO GET MORE.: NEVER TRUE

## 2023-09-18 SDOH — ECONOMIC STABILITY: INCOME INSECURITY: HOW HARD IS IT FOR YOU TO PAY FOR THE VERY BASICS LIKE FOOD, HOUSING, MEDICAL CARE, AND HEATING?: NOT HARD AT ALL

## 2023-09-18 ASSESSMENT — ENCOUNTER SYMPTOMS
EYES NEGATIVE: 1
ALLERGIC/IMMUNOLOGIC NEGATIVE: 1
RESPIRATORY NEGATIVE: 1
GASTROINTESTINAL NEGATIVE: 1

## 2023-09-18 ASSESSMENT — PATIENT HEALTH QUESTIONNAIRE - PHQ9
SUM OF ALL RESPONSES TO PHQ9 QUESTIONS 1 & 2: 0
2. FEELING DOWN, DEPRESSED OR HOPELESS: 0
SUM OF ALL RESPONSES TO PHQ QUESTIONS 1-9: 0
1. LITTLE INTEREST OR PLEASURE IN DOING THINGS: 0

## 2023-09-19 LAB
ALBUMIN SERPL-MCNC: 3.7 G/DL (ref 3.5–5)
ALBUMIN/GLOB SERPL: 1.3 (ref 1.1–2.2)
ALP SERPL-CCNC: 80 U/L (ref 45–117)
ALT SERPL-CCNC: 31 U/L (ref 12–78)
ANION GAP SERPL CALC-SCNC: 6 MMOL/L (ref 5–15)
AST SERPL-CCNC: 29 U/L (ref 15–37)
BASOPHILS # BLD: 0 K/UL (ref 0–0.1)
BASOPHILS NFR BLD: 0 % (ref 0–1)
BILIRUB SERPL-MCNC: 0.3 MG/DL (ref 0.2–1)
BUN SERPL-MCNC: 10 MG/DL (ref 6–20)
BUN/CREAT SERPL: 16 (ref 12–20)
CALCIUM SERPL-MCNC: 8.9 MG/DL (ref 8.5–10.1)
CHLORIDE SERPL-SCNC: 112 MMOL/L (ref 97–108)
CHOLEST SERPL-MCNC: 179 MG/DL
CO2 SERPL-SCNC: 23 MMOL/L (ref 21–32)
CREAT SERPL-MCNC: 0.64 MG/DL (ref 0.55–1.02)
DIFFERENTIAL METHOD BLD: ABNORMAL
EOSINOPHIL # BLD: 0.2 K/UL (ref 0–0.4)
EOSINOPHIL NFR BLD: 3 % (ref 0–7)
ERYTHROCYTE [DISTWIDTH] IN BLOOD BY AUTOMATED COUNT: 13.5 % (ref 11.5–14.5)
EST. AVERAGE GLUCOSE BLD GHB EST-MCNC: 85 MG/DL
GLOBULIN SER CALC-MCNC: 2.9 G/DL (ref 2–4)
GLUCOSE SERPL-MCNC: 112 MG/DL (ref 65–100)
HBA1C MFR BLD: 4.6 % (ref 4–5.6)
HCT VFR BLD AUTO: 38.2 % (ref 35–47)
HDLC SERPL-MCNC: 17 MG/DL
HDLC SERPL: 10.5 (ref 0–5)
HGB BLD-MCNC: 12.4 G/DL (ref 11.5–16)
IMM GRANULOCYTES # BLD AUTO: 0 K/UL (ref 0–0.04)
IMM GRANULOCYTES NFR BLD AUTO: 1 % (ref 0–0.5)
LDLC SERPL CALC-MCNC: 85 MG/DL (ref 0–100)
LYMPHOCYTES # BLD: 1.9 K/UL (ref 0.8–3.5)
LYMPHOCYTES NFR BLD: 33 % (ref 12–49)
MCH RBC QN AUTO: 30.3 PG (ref 26–34)
MCHC RBC AUTO-ENTMCNC: 32.5 G/DL (ref 30–36.5)
MCV RBC AUTO: 93.4 FL (ref 80–99)
MONOCYTES # BLD: 0.4 K/UL (ref 0–1)
MONOCYTES NFR BLD: 7 % (ref 5–13)
NEUTS SEG # BLD: 3.3 K/UL (ref 1.8–8)
NEUTS SEG NFR BLD: 56 % (ref 32–75)
NRBC # BLD: 0 K/UL (ref 0–0.01)
NRBC BLD-RTO: 0 PER 100 WBC
PLATELET # BLD AUTO: 300 K/UL (ref 150–400)
PMV BLD AUTO: 11.3 FL (ref 8.9–12.9)
POTASSIUM SERPL-SCNC: 4 MMOL/L (ref 3.5–5.1)
PROT SERPL-MCNC: 6.6 G/DL (ref 6.4–8.2)
RBC # BLD AUTO: 4.09 M/UL (ref 3.8–5.2)
SODIUM SERPL-SCNC: 141 MMOL/L (ref 136–145)
TRIGL SERPL-MCNC: 385 MG/DL
TSH SERPL DL<=0.05 MIU/L-ACNC: 0.97 UIU/ML (ref 0.36–3.74)
VLDLC SERPL CALC-MCNC: 77 MG/DL
WBC # BLD AUTO: 5.8 K/UL (ref 3.6–11)

## 2023-09-21 DIAGNOSIS — R10.9 UNSPECIFIED ABDOMINAL PAIN: ICD-10-CM

## 2023-09-21 DIAGNOSIS — K21.9 GASTRO-ESOPHAGEAL REFLUX DISEASE WITHOUT ESOPHAGITIS: ICD-10-CM

## 2023-09-21 RX ORDER — PANTOPRAZOLE SODIUM 40 MG/1
40 TABLET, DELAYED RELEASE ORAL DAILY
Qty: 90 TABLET | Refills: 0 | Status: SHIPPED | OUTPATIENT
Start: 2023-09-21

## 2023-09-21 NOTE — TELEPHONE ENCOUNTER
PCP: Rosendo Anderson, APRN - NP    Last appt: [unfilled]  No future appointments.     Requested Prescriptions     Pending Prescriptions Disp Refills    pantoprazole (PROTONIX) 40 MG tablet [Pharmacy Med Name: PANTOPRAZOLE SOD DR 40 MG TAB] 90 tablet      Sig: TAKE 1 TABLET BY MOUTH EVERY DAY       Prior labs and Blood pressures:  BP Readings from Last 3 Encounters:   09/18/23 120/83   10/10/22 (!) 131/91   09/06/22 (!) 133/97     Lab Results   Component Value Date/Time     09/18/2023 03:09 PM    K 4.0 09/18/2023 03:09 PM     09/18/2023 03:09 PM    CO2 23 09/18/2023 03:09 PM    BUN 10 09/18/2023 03:09 PM     No results found for: \"HBA1C\", \"[id number]\"  Lab Results   Component Value Date/Time    CHOL 179 09/18/2023 03:09 PM    HDL 17 09/18/2023 03:09 PM    VLDL 43 04/12/2022 10:53 AM     No results found for: \"VITD3\", \"VD3RIA\"    No results found for: \"TSH\", \"TSH2\", \"TSH3\"

## 2023-09-25 NOTE — TELEPHONE ENCOUNTER
PCP: FANI Mendoza NP    Last appt: [unfilled]  No future appointments.     Requested Prescriptions     Pending Prescriptions Disp Refills    metoprolol tartrate (LOPRESSOR) 25 MG tablet [Pharmacy Med Name: METOPROLOL TARTRATE 25 MG TAB] 120 tablet 1     Sig: TAKE 1 TABLET BY MOUTH TWICE A DAY       Prior labs and Blood pressures:  BP Readings from Last 3 Encounters:   09/18/23 120/83   10/10/22 (!) 131/91   09/06/22 (!) 133/97     Lab Results   Component Value Date/Time     09/18/2023 03:09 PM    K 4.0 09/18/2023 03:09 PM     09/18/2023 03:09 PM    CO2 23 09/18/2023 03:09 PM    BUN 10 09/18/2023 03:09 PM     No results found for: \"HBA1C\", \"[id number]\"  Lab Results   Component Value Date/Time    CHOL 179 09/18/2023 03:09 PM    HDL 17 09/18/2023 03:09 PM    VLDL 43 04/12/2022 10:53 AM     No results found for: \"VITD3\", \"VD3RIA\"    No results found for: \"TSH\", \"TSH2\", \"TSH3\"

## 2023-10-12 DIAGNOSIS — M62.838 OTHER MUSCLE SPASM: ICD-10-CM

## 2023-10-14 RX ORDER — METHOCARBAMOL 500 MG/1
TABLET, FILM COATED ORAL
Qty: 30 TABLET | Refills: 1 | Status: SHIPPED | OUTPATIENT
Start: 2023-10-14

## 2023-12-06 DIAGNOSIS — I10 ESSENTIAL (PRIMARY) HYPERTENSION: ICD-10-CM

## 2023-12-07 RX ORDER — AMLODIPINE BESYLATE 5 MG/1
TABLET ORAL
Qty: 90 TABLET | Refills: 1 | Status: SHIPPED | OUTPATIENT
Start: 2023-12-07

## 2023-12-07 NOTE — TELEPHONE ENCOUNTER
PCP: Barbie Smith APRN - NP    Last appt: [unfilled]  No future appointments.     Requested Prescriptions     Pending Prescriptions Disp Refills    amLODIPine (NORVASC) 5 MG tablet [Pharmacy Med Name: AMLODIPINE BESYLATE 5 MG TAB] 90 tablet 1     Sig: TAKE 1 TABLET BY MOUTH EVERY DAY FOR HIGH BLOOD PRESSURE       Prior labs and Blood pressures:  BP Readings from Last 3 Encounters:   09/18/23 120/83   10/10/22 (!) 131/91   09/06/22 (!) 133/97     Lab Results   Component Value Date/Time     09/18/2023 03:09 PM    K 4.0 09/18/2023 03:09 PM     09/18/2023 03:09 PM    CO2 23 09/18/2023 03:09 PM    BUN 10 09/18/2023 03:09 PM     No results found for: \"HBA1C\", \"[id number]\"  Lab Results   Component Value Date/Time    CHOL 179 09/18/2023 03:09 PM    HDL 17 09/18/2023 03:09 PM    VLDL 43 04/12/2022 10:53 AM     No results found for: \"VITD3\", \"VD3RIA\"    No results found for: \"TSH\", \"TSH2\", \"TSH3\"

## 2023-12-18 DIAGNOSIS — M62.838 OTHER MUSCLE SPASM: ICD-10-CM

## 2023-12-18 DIAGNOSIS — R11.0 NAUSEA: ICD-10-CM

## 2023-12-18 DIAGNOSIS — R52 PAIN, UNSPECIFIED: ICD-10-CM

## 2023-12-18 RX ORDER — CYCLOBENZAPRINE HCL 10 MG
10 TABLET ORAL 3 TIMES DAILY PRN
Qty: 30 TABLET | Refills: 2 | OUTPATIENT
Start: 2023-12-18

## 2023-12-18 RX ORDER — ONDANSETRON 4 MG/1
4 TABLET, FILM COATED ORAL EVERY 8 HOURS PRN
Qty: 30 TABLET | Refills: 1 | OUTPATIENT
Start: 2023-12-18

## 2023-12-18 NOTE — TELEPHONE ENCOUNTER
PCP: Adriel Antony, FANI - NP    Last appt: [unfilled]  Future Appointments   Date Time Provider Department Center   12/21/2023  1:00 PM Antonia Veliz APRN - CNP [facility]       Requested Prescriptions     Pending Prescriptions Disp Refills    cyclobenzaprine (FLEXERIL) 10 MG tablet [Pharmacy Med Name: CYCLOBENZAPRINE 10 MG TABLET] 30 tablet 2     Sig: TAKE 1 TABLET BY MOUTH THREE TIMES A DAY AS NEEDED FOR MUSCLE SPASMS    ondansetron (ZOFRAN) 4 MG tablet 30 tablet 1     Sig: Take 1 tablet by mouth every 8 hours as needed for Nausea       Prior labs and Blood pressures:  BP Readings from Last 3 Encounters:   09/18/23 120/83   10/10/22 (!) 131/91   09/06/22 (!) 133/97     Lab Results   Component Value Date/Time     09/18/2023 03:09 PM    K 4.0 09/18/2023 03:09 PM     09/18/2023 03:09 PM    CO2 23 09/18/2023 03:09 PM    BUN 10 09/18/2023 03:09 PM     No results found for: \"HBA1C\", \"[id number]\"  Lab Results   Component Value Date/Time    CHOL 179 09/18/2023 03:09 PM    HDL 17 09/18/2023 03:09 PM    VLDL 43 04/12/2022 10:53 AM     No results found for: \"VITD3\", \"VD3RIA\"    No results found for: \"TSH\", \"TSH2\", \"TSH3\"

## 2023-12-20 NOTE — TELEPHONE ENCOUNTER
PCP: Adriel Antony, FANI Tracey NP    Last appt: [unfilled]  Future Appointments   Date Time Provider Department Center   12/21/2023  1:00 PM Antonia Veliz APRN - CNP [facility]       Requested Prescriptions     Refused Prescriptions Disp Refills    cyclobenzaprine (FLEXERIL) 10 MG tablet [Pharmacy Med Name: CYCLOBENZAPRINE 10 MG TABLET] 30 tablet 2     Sig: TAKE 1 TABLET BY MOUTH THREE TIMES A DAY AS NEEDED FOR MUSCLE SPASMS     Refused By: ADAN MANUEL     Reason for Refusal: Duplicate Request    ondansetron (ZOFRAN) 4 MG tablet 30 tablet 1     Sig: Take 1 tablet by mouth every 8 hours as needed for Nausea     Refused By: ADAN MANUEL     Reason for Refusal: Duplicate Request       Prior labs and Blood pressures:  BP Readings from Last 3 Encounters:   09/18/23 120/83   10/10/22 (!) 131/91   09/06/22 (!) 133/97     Lab Results   Component Value Date/Time     09/18/2023 03:09 PM    K 4.0 09/18/2023 03:09 PM     09/18/2023 03:09 PM    CO2 23 09/18/2023 03:09 PM    BUN 10 09/18/2023 03:09 PM     No results found for: \"HBA1C\", \"[id number]\"  Lab Results   Component Value Date/Time    CHOL 179 09/18/2023 03:09 PM    HDL 17 09/18/2023 03:09 PM    VLDL 43 04/12/2022 10:53 AM     No results found for: \"VITD3\", \"VD3RIA\"    No results found for: \"TSH\", \"TSH2\", \"TSH3\"

## 2024-01-18 NOTE — TELEPHONE ENCOUNTER
PCP: Adriel Antony, APRN - NP    Last appt: [unfilled]  No future appointments.    Requested Prescriptions     Pending Prescriptions Disp Refills    metoprolol tartrate (LOPRESSOR) 25 MG tablet [Pharmacy Med Name: METOPROLOL TARTRATE 25 MG TAB] 120 tablet 1     Sig: TAKE 1 TABLET BY MOUTH TWICE A DAY       Prior labs and Blood pressures:  BP Readings from Last 3 Encounters:   12/21/23 136/82   09/18/23 120/83   10/10/22 (!) 131/91     Lab Results   Component Value Date/Time     09/18/2023 03:09 PM    K 4.0 09/18/2023 03:09 PM     09/18/2023 03:09 PM    CO2 23 09/18/2023 03:09 PM    BUN 10 09/18/2023 03:09 PM     No results found for: \"HBA1C\", \"[id number]\"  Lab Results   Component Value Date/Time    CHOL 179 09/18/2023 03:09 PM    HDL 17 09/18/2023 03:09 PM    VLDL 43 04/12/2022 10:53 AM     No results found for: \"VITD3\", \"VD3RIA\"    No results found for: \"TSH\", \"TSH2\", \"TSH3\"

## 2024-01-19 ENCOUNTER — TELEPHONE (OUTPATIENT)
Facility: CLINIC | Age: 34
End: 2024-01-19

## 2024-01-19 DIAGNOSIS — M62.838 OTHER MUSCLE SPASM: ICD-10-CM

## 2024-01-19 RX ORDER — METHOCARBAMOL 500 MG/1
TABLET, FILM COATED ORAL
Qty: 30 TABLET | Refills: 0 | Status: SHIPPED | OUTPATIENT
Start: 2024-01-19

## 2024-01-19 NOTE — TELEPHONE ENCOUNTER
PCP: Adriel Antony, APRN - NP    Last appt: [unfilled]  No future appointments.    Requested Prescriptions     Pending Prescriptions Disp Refills    methocarbamol (ROBAXIN) 500 MG tablet 30 tablet 1     Sig: TAKE 1 TABLET BY MOUTH THREE (3) TIMES DAILY. INDICATIONS: MUSCLE SPASM       Prior labs and Blood pressures:  BP Readings from Last 3 Encounters:   12/21/23 136/82   09/18/23 120/83   10/10/22 (!) 131/91     Lab Results   Component Value Date/Time     09/18/2023 03:09 PM    K 4.0 09/18/2023 03:09 PM     09/18/2023 03:09 PM    CO2 23 09/18/2023 03:09 PM    BUN 10 09/18/2023 03:09 PM     No results found for: \"HBA1C\", \"[id number]\"  Lab Results   Component Value Date/Time    CHOL 179 09/18/2023 03:09 PM    HDL 17 09/18/2023 03:09 PM    VLDL 43 04/12/2022 10:53 AM     No results found for: \"VITD3\", \"VD3RIA\"    No results found for: \"TSH\", \"TSH2\", \"TSH3\"

## 2024-02-06 DIAGNOSIS — R11.0 NAUSEA: ICD-10-CM

## 2024-02-06 DIAGNOSIS — M62.838 OTHER MUSCLE SPASM: ICD-10-CM

## 2024-02-07 RX ORDER — METHOCARBAMOL 500 MG/1
TABLET, FILM COATED ORAL
Qty: 30 TABLET | Refills: 0 | OUTPATIENT
Start: 2024-02-07

## 2024-02-07 NOTE — TELEPHONE ENCOUNTER
Chief Complaint   Patient presents with    Medication Refill       Requested Prescriptions     Pending Prescriptions Disp Refills    methocarbamol (ROBAXIN) 500 MG tablet [Pharmacy Med Name: METHOCARBAMOL 500 MG TABLET] 30 tablet 0     Sig: TAKE 1 TABLET BY MOUTH THREE (3) TIMES DAILY. INDICATIONS: MUSCLE SPASM       Allergies:  Allergies   Allergen Reactions    Fremanezumab-Vfrm Cough, Shortness Of Breath and Dizziness or Vertigo    Etodolac Other (See Comments)     Headache    Naproxen Other (See Comments)    Nitrofurantoin Monohyd Macro Other (See Comments)    Pseudoephedrine-Ibuprofen Other (See Comments)    Topiramate Other (See Comments)     Abdominal pain, Lactic Acidosis    Nsaids Nausea And Vomiting       Last visit with clinic:  12/21/2023   Next visit with clinic: Visit date not found     Last visit with this provider: Visit date not found   Next Visit with this provider: Visit date not found    Signed by Saadia Romero MA CMA  02/07/24  12:17 PM

## 2024-02-08 RX ORDER — METHOCARBAMOL 500 MG/1
TABLET, FILM COATED ORAL
Qty: 30 TABLET | Refills: 0 | Status: SHIPPED | OUTPATIENT
Start: 2024-02-08

## 2024-02-13 NOTE — TELEPHONE ENCOUNTER
PCP: Adriel Antony APRN - NP    Last appt: [unfilled]  Future Appointments   Date Time Provider Department Center   2/16/2024  1:00 PM Adriel Antony APRN - NP [facility]       Requested Prescriptions     Pending Prescriptions Disp Refills    ondansetron (ZOFRAN) 4 MG tablet [Pharmacy Med Name: ONDANSETRON HCL 4 MG TABLET] 30 tablet 1     Sig: TAKE 1 TABLET BY MOUTH EVERY 8 HOURS AS NEEDED FOR NAUSEA       Prior labs and Blood pressures:  BP Readings from Last 3 Encounters:   12/21/23 136/82   09/18/23 120/83   10/10/22 (!) 131/91     Lab Results   Component Value Date/Time     09/18/2023 03:09 PM    K 4.0 09/18/2023 03:09 PM     09/18/2023 03:09 PM    CO2 23 09/18/2023 03:09 PM    BUN 10 09/18/2023 03:09 PM     No results found for: \"HBA1C\", \"[id number]\"  Lab Results   Component Value Date/Time    CHOL 179 09/18/2023 03:09 PM    HDL 17 09/18/2023 03:09 PM    VLDL 43 04/12/2022 10:53 AM     No results found for: \"VITD3\", \"VD3RIA\"    No results found for: \"TSH\", \"TSH2\", \"TSH3\"

## 2024-02-14 RX ORDER — ONDANSETRON 4 MG/1
4 TABLET, FILM COATED ORAL EVERY 8 HOURS PRN
Qty: 30 TABLET | Refills: 1 | Status: SHIPPED | OUTPATIENT
Start: 2024-02-14

## 2024-02-27 DIAGNOSIS — M62.838 OTHER MUSCLE SPASM: ICD-10-CM

## 2024-02-29 NOTE — TELEPHONE ENCOUNTER
PCP: Adriel Antony APRN - NP    Last appt: [unfilled]  Future Appointments   Date Time Provider Department Center   3/8/2024 11:00 AM Adriel Antony APRN - NP [facility]       Requested Prescriptions     Pending Prescriptions Disp Refills    methocarbamol (ROBAXIN) 500 MG tablet [Pharmacy Med Name: METHOCARBAMOL 500 MG TABLET] 30 tablet 0     Sig: TAKE 1 TABLET BY MOUTH THREE (3) TIMES DAILY. INDICATIONS: MUSCLE SPASM       Prior labs and Blood pressures:  BP Readings from Last 3 Encounters:   12/21/23 136/82   09/18/23 120/83   10/10/22 (!) 131/91     Lab Results   Component Value Date/Time     09/18/2023 03:09 PM    K 4.0 09/18/2023 03:09 PM     09/18/2023 03:09 PM    CO2 23 09/18/2023 03:09 PM    BUN 10 09/18/2023 03:09 PM     No results found for: \"HBA1C\", \"[id number]\"  Lab Results   Component Value Date/Time    CHOL 179 09/18/2023 03:09 PM    HDL 17 09/18/2023 03:09 PM    VLDL 43 04/12/2022 10:53 AM     No results found for: \"VITD3\", \"VD3RIA\"    No results found for: \"TSH\", \"TSH2\", \"TSH3\"

## 2024-03-01 RX ORDER — METHOCARBAMOL 500 MG/1
TABLET, FILM COATED ORAL
Qty: 30 TABLET | Refills: 0 | Status: SHIPPED | OUTPATIENT
Start: 2024-03-01

## 2024-03-08 ENCOUNTER — OFFICE VISIT (OUTPATIENT)
Facility: CLINIC | Age: 34
End: 2024-03-08

## 2024-03-08 VITALS — HEART RATE: 88 BPM | RESPIRATION RATE: 17 BRPM | HEIGHT: 61 IN | BODY MASS INDEX: 30.21 KG/M2 | WEIGHT: 160 LBS

## 2024-03-08 DIAGNOSIS — M62.838 OTHER MUSCLE SPASM: ICD-10-CM

## 2024-03-08 DIAGNOSIS — Z09 HOSPITAL DISCHARGE FOLLOW-UP: Primary | ICD-10-CM

## 2024-03-08 RX ORDER — UBROGEPANT 100 MG/1
TABLET ORAL
COMMUNITY

## 2024-03-08 ASSESSMENT — PATIENT HEALTH QUESTIONNAIRE - PHQ9
2. FEELING DOWN, DEPRESSED OR HOPELESS: 0
SUM OF ALL RESPONSES TO PHQ QUESTIONS 1-9: 0
1. LITTLE INTEREST OR PLEASURE IN DOING THINGS: 0
SUM OF ALL RESPONSES TO PHQ QUESTIONS 1-9: 0
SUM OF ALL RESPONSES TO PHQ9 QUESTIONS 1 & 2: 0

## 2024-03-08 NOTE — PATIENT INSTRUCTIONS
Continue all medications  Follow up with Neurology  Schedule with [facility] for pool use  Reduce soda and sugar intake

## 2024-03-08 NOTE — PROGRESS NOTES
Identified pt with two pt identifiers(name and ). Reviewed record in preparation for visit and have obtained necessary documentation. All patient medications has been reviewed.  Chief Complaint   Patient presents with    Follow-Up from Hospital    migraines       Vitals:    24 1055   Pulse: 88   Resp: 17                   Coordination of Care Questionnaire:   1) Have you been to an emergency room, urgent care, or hospitalized since your last visit?  [facility], migraines    2. Have seen or consulted any other health care provider since your last visit? no        Patient is accompanied by mother I have received verbal consent from Marcela Lawson to discuss any/all medical information while they are present in the room.

## 2024-03-08 NOTE — PROGRESS NOTES
Assessment/Plan:     Marcela was seen today for follow-up from hospital and migraines.    Diagnoses and all orders for this visit:    Hospital discharge follow-up    Patient was seen in office for hospital discharge follow-up for migraine.  She is accompanied by her mother.  Hospital record reviewed.  Patient reports taking all of her medications as prescribed and is being followed by neurology.  She has had no breakthrough headaches since discharge.  She is working with physical therapy to become more independent and is practicing moving from bed to bedside commode.  I did recommend that she follow-up with [facility] since she is connected with them with physical therapy in regards to pool use.  I feel this will be helpful for rehab.  We also discussed the need for better diet.  She reports drinking regular Dr. Pepper daily.  We discussed the need to drink more water and to begin reducing this soda use gradually.  We also discussed other food options that would be healthier.  She is taking all of her medications, she is agreeable to dietary changes and will follow-up as needed.    No follow-up provider specified.    Discussed expected course/resolution/complications of diagnosis in detail with patient.    Medication risks/benefits/costs/interactions/alternatives discussed with patient.    Pt was given after visit summary which includes diagnoses, current medications & vitals.   Pt expressed understanding with the diagnosis and plan        Subjective:      Marcela Lawson is a 33 y.o. female who presents for had concerns including Follow-Up from Hospital and migraines.     Current Outpatient Medications   Medication Sig Dispense Refill    Ubrogepant (UBRELVY) 100 MG TABS Take by mouth      methocarbamol (ROBAXIN) 500 MG tablet TAKE 1 TABLET BY MOUTH THREE (3) TIMES DAILY. INDICATIONS: MUSCLE SPASM 30 tablet 0    ondansetron (ZOFRAN) 4 MG tablet TAKE 1 TABLET BY MOUTH EVERY 8 HOURS AS NEEDED FOR NAUSEA 30 tablet 1

## 2024-03-11 RX ORDER — METHOCARBAMOL 500 MG/1
TABLET, FILM COATED ORAL
Qty: 30 TABLET | Refills: 0 | OUTPATIENT
Start: 2024-03-11

## 2024-03-12 ENCOUNTER — TELEPHONE (OUTPATIENT)
Facility: CLINIC | Age: 34
End: 2024-03-12

## 2024-03-12 DIAGNOSIS — M62.838 OTHER MUSCLE SPASM: ICD-10-CM

## 2024-03-12 RX ORDER — METHOCARBAMOL 500 MG/1
TABLET, FILM COATED ORAL
Qty: 30 TABLET | Refills: 0 | OUTPATIENT
Start: 2024-03-12

## 2024-03-12 NOTE — TELEPHONE ENCOUNTER
Pt's mother called in to check on the status of a medication request for methocarbamol (ROBAXIN) 500 MG tablet. After viewing chart I saw that it was denied but I don't see why. I do see that the pt had an apt on the 8th of March with Adriel Antony.      Please advise

## 2024-03-12 NOTE — TELEPHONE ENCOUNTER
PCP: Adriel Antony, APRN - NP    Last appt: [unfilled]  No future appointments.    Requested Prescriptions     Pending Prescriptions Disp Refills    methocarbamol (ROBAXIN) 500 MG tablet [Pharmacy Med Name: METHOCARBAMOL 500 MG TABLET] 30 tablet 0     Sig: TAKE 1 TABLET BY MOUTH THREE (3) TIMES DAILY. INDICATIONS: MUSCLE SPASM       Prior labs and Blood pressures:  BP Readings from Last 3 Encounters:   12/21/23 136/82   09/18/23 120/83   10/10/22 (!) 131/91     Lab Results   Component Value Date/Time     09/18/2023 03:09 PM    K 4.0 09/18/2023 03:09 PM     09/18/2023 03:09 PM    CO2 23 09/18/2023 03:09 PM    BUN 10 09/18/2023 03:09 PM     No results found for: \"HBA1C\", \"[id number]\"  Lab Results   Component Value Date/Time    CHOL 179 09/18/2023 03:09 PM    HDL 17 09/18/2023 03:09 PM    VLDL 43 04/12/2022 10:53 AM     No results found for: \"VITD3\", \"VD3RIA\"    No results found for: \"TSH\", \"TSH2\", \"TSH3\"

## 2024-03-15 DIAGNOSIS — M62.838 OTHER MUSCLE SPASM: ICD-10-CM

## 2024-03-15 RX ORDER — METHOCARBAMOL 500 MG/1
TABLET, FILM COATED ORAL
Qty: 90 TABLET | Refills: 2 | Status: SHIPPED | OUTPATIENT
Start: 2024-03-15

## 2024-03-15 NOTE — TELEPHONE ENCOUNTER
Patient's mom called regarding a new refill on the Methocarbamol, patient's mother states this was only a 10 day supply and needs the medication refilled right away.

## 2024-03-30 DIAGNOSIS — R11.0 NAUSEA: ICD-10-CM

## 2024-04-01 RX ORDER — ONDANSETRON 4 MG/1
4 TABLET, FILM COATED ORAL EVERY 8 HOURS PRN
Qty: 30 TABLET | Refills: 1 | Status: SHIPPED | OUTPATIENT
Start: 2024-04-01

## 2024-04-01 NOTE — TELEPHONE ENCOUNTER
PCP: Adriel Antony, APRN - NP    Last appt: [unfilled]  No future appointments.    Requested Prescriptions     Pending Prescriptions Disp Refills    ondansetron (ZOFRAN) 4 MG tablet [Pharmacy Med Name: ONDANSETRON HCL 4 MG TABLET] 30 tablet 1     Sig: TAKE 1 TABLET BY MOUTH EVERY 8 HOURS AS NEEDED FOR NAUSEA       Prior labs and Blood pressures:  BP Readings from Last 3 Encounters:   12/21/23 136/82   09/18/23 120/83   10/10/22 (!) 131/91     Lab Results   Component Value Date/Time     09/18/2023 03:09 PM    K 4.0 09/18/2023 03:09 PM     09/18/2023 03:09 PM    CO2 23 09/18/2023 03:09 PM    BUN 10 09/18/2023 03:09 PM     No results found for: \"HBA1C\", \"[id number]\"  Lab Results   Component Value Date/Time    CHOL 179 09/18/2023 03:09 PM    HDL 17 09/18/2023 03:09 PM    VLDL 43 04/12/2022 10:53 AM     No results found for: \"VITD3\", \"VD3RIA\"    No results found for: \"TSH\", \"TSH2\", \"TSH3\"

## 2024-04-11 ENCOUNTER — TELEPHONE (OUTPATIENT)
Facility: CLINIC | Age: 34
End: 2024-04-11

## 2024-04-11 RX ORDER — AMITRIPTYLINE HYDROCHLORIDE 50 MG/1
50 TABLET, FILM COATED ORAL
Qty: 30 TABLET | OUTPATIENT
Start: 2024-04-11

## 2024-04-11 NOTE — TELEPHONE ENCOUNTER
PCP: Adriel Antony, APRN - NP    Last appt: [unfilled]  No future appointments.    Requested Prescriptions     Pending Prescriptions Disp Refills    amitriptyline (ELAVIL) 50 MG tablet [Pharmacy Med Name: AMITRIPTYLINE HCL 50 MG TAB] 30 tablet      Sig: TAKE 1 TABLET BY MOUTH EVERYDAY AT BEDTIME       Prior labs and Blood pressures:  BP Readings from Last 3 Encounters:   12/21/23 136/82   09/18/23 120/83   10/10/22 (!) 131/91     Lab Results   Component Value Date/Time     09/18/2023 03:09 PM    K 4.0 09/18/2023 03:09 PM     09/18/2023 03:09 PM    CO2 23 09/18/2023 03:09 PM    BUN 10 09/18/2023 03:09 PM     No results found for: \"HBA1C\", \"LTA1QUAR\"  Lab Results   Component Value Date/Time    CHOL 179 09/18/2023 03:09 PM    HDL 17 09/18/2023 03:09 PM    VLDL 43 04/12/2022 10:53 AM     No results found for: \"VITD3\", \"VD3RIA\"    No results found for: \"TSH\", \"TSH2\", \"TSH3\"

## 2024-05-13 NOTE — TELEPHONE ENCOUNTER
PCP: Adriel Antony, APRN - NP    Last appt: [unfilled]  No future appointments.    Requested Prescriptions     Pending Prescriptions Disp Refills    metoprolol tartrate (LOPRESSOR) 25 MG tablet [Pharmacy Med Name: METOPROLOL TARTRATE 25 MG TAB] 120 tablet 1     Sig: TAKE 1 TABLET BY MOUTH TWICE A DAY       Prior labs and Blood pressures:  BP Readings from Last 3 Encounters:   12/21/23 136/82   09/18/23 120/83   10/10/22 (!) 131/91     Lab Results   Component Value Date/Time     09/18/2023 03:09 PM    K 4.0 09/18/2023 03:09 PM     09/18/2023 03:09 PM    CO2 23 09/18/2023 03:09 PM    BUN 10 09/18/2023 03:09 PM     No results found for: \"HBA1C\", \"[id number]\"  Lab Results   Component Value Date/Time    CHOL 179 09/18/2023 03:09 PM    HDL 17 09/18/2023 03:09 PM    LDL 85 09/18/2023 03:09 PM    VLDL 77 09/18/2023 03:09 PM    VLDL 43 04/12/2022 10:53 AM     No results found for: \"VITD3\", \"VD3RIA\"    No results found for: \"TSH\", \"TSH2\", \"TSH3\"

## 2024-06-11 DIAGNOSIS — M62.838 OTHER MUSCLE SPASM: ICD-10-CM

## 2024-06-12 RX ORDER — METHOCARBAMOL 500 MG/1
TABLET, FILM COATED ORAL
Qty: 90 TABLET | Refills: 2 | OUTPATIENT
Start: 2024-06-12

## 2024-06-12 NOTE — TELEPHONE ENCOUNTER
PCP: Adriel Antony, APRN - NP    Last appt: [unfilled]  No future appointments.    Requested Prescriptions     Pending Prescriptions Disp Refills    methocarbamol (ROBAXIN) 500 MG tablet [Pharmacy Med Name: METHOCARBAMOL 500 MG TABLET] 90 tablet 2     Sig: TAKE 1 TABLET BY MOUTH THREE (3) TIMES DAILY. INDICATIONS: MUSCLE SPASM       Prior labs and Blood pressures:  BP Readings from Last 3 Encounters:   12/21/23 136/82   09/18/23 120/83   10/10/22 (!) 131/91     Lab Results   Component Value Date/Time     09/18/2023 03:09 PM    K 4.0 09/18/2023 03:09 PM     09/18/2023 03:09 PM    CO2 23 09/18/2023 03:09 PM    BUN 10 09/18/2023 03:09 PM     No results found for: \"HBA1C\", \"[id number]\"  Lab Results   Component Value Date/Time    CHOL 179 09/18/2023 03:09 PM    HDL 17 09/18/2023 03:09 PM    LDL 85 09/18/2023 03:09 PM    VLDL 77 09/18/2023 03:09 PM    VLDL 43 04/12/2022 10:53 AM     No results found for: \"VITD3\"    No results found for: \"TSH\", \"TSH2\", \"TSH3\"

## 2024-06-19 DIAGNOSIS — M62.838 OTHER MUSCLE SPASM: ICD-10-CM

## 2024-06-19 RX ORDER — METHOCARBAMOL 500 MG/1
TABLET, FILM COATED ORAL
Qty: 90 TABLET | Refills: 2 | OUTPATIENT
Start: 2024-06-19

## 2024-06-19 NOTE — TELEPHONE ENCOUNTER
Patient mother contact the office to schedule appointment requesting a refill enough until appointment on 6/25.

## 2024-06-25 ENCOUNTER — OFFICE VISIT (OUTPATIENT)
Facility: CLINIC | Age: 34
End: 2024-06-25
Payer: MEDICAID

## 2024-06-25 VITALS
SYSTOLIC BLOOD PRESSURE: 132 MMHG | BODY MASS INDEX: 30.21 KG/M2 | DIASTOLIC BLOOD PRESSURE: 93 MMHG | RESPIRATION RATE: 20 BRPM | TEMPERATURE: 97.6 F | WEIGHT: 160 LBS | HEIGHT: 61 IN | HEART RATE: 98 BPM | OXYGEN SATURATION: 95 %

## 2024-06-25 DIAGNOSIS — I10 ESSENTIAL (PRIMARY) HYPERTENSION: ICD-10-CM

## 2024-06-25 DIAGNOSIS — M62.838 OTHER MUSCLE SPASM: Primary | ICD-10-CM

## 2024-06-25 DIAGNOSIS — K21.9 GASTRO-ESOPHAGEAL REFLUX DISEASE WITHOUT ESOPHAGITIS: ICD-10-CM

## 2024-06-25 PROCEDURE — 3080F DIAST BP >= 90 MM HG: CPT | Performed by: NURSE PRACTITIONER

## 2024-06-25 PROCEDURE — 99214 OFFICE O/P EST MOD 30 MIN: CPT | Performed by: NURSE PRACTITIONER

## 2024-06-25 PROCEDURE — 3075F SYST BP GE 130 - 139MM HG: CPT | Performed by: NURSE PRACTITIONER

## 2024-06-25 RX ORDER — METHYLPREDNISOLONE 4 MG/1
4 TABLET ORAL DAILY
COMMUNITY
Start: 2024-06-15 | End: 2024-06-26

## 2024-06-25 RX ORDER — PANTOPRAZOLE SODIUM 40 MG/1
40 TABLET, DELAYED RELEASE ORAL DAILY
Qty: 90 TABLET | Refills: 0 | Status: SHIPPED | OUTPATIENT
Start: 2024-06-25

## 2024-06-25 RX ORDER — METHOCARBAMOL 500 MG/1
TABLET, FILM COATED ORAL
Qty: 90 TABLET | Refills: 2 | Status: SHIPPED | OUTPATIENT
Start: 2024-06-25

## 2024-06-25 RX ORDER — AMLODIPINE BESYLATE 5 MG/1
5 TABLET ORAL DAILY
Qty: 90 TABLET | Refills: 1 | Status: SHIPPED | OUTPATIENT
Start: 2024-06-25

## 2024-06-25 NOTE — PATIENT INSTRUCTIONS
Take all meds as prescribed  Follow up with Neurology this week  Return for any needs, Appears September Physical with labs

## 2024-06-26 ASSESSMENT — ENCOUNTER SYMPTOMS
ALLERGIC/IMMUNOLOGIC NEGATIVE: 1
SHORTNESS OF BREATH: 0
GASTROINTESTINAL NEGATIVE: 1
RESPIRATORY NEGATIVE: 1
EYES NEGATIVE: 1
COUGH: 0

## 2024-06-26 NOTE — PROGRESS NOTES
dentified pt with two pt identifiers(name and ).    Chief Complaint   Patient presents with    Medication Refill     Methocarbamol, Metoprolol, Pantoprazole, Amlodipine        Health Maintenance Due   Topic    Hepatitis B vaccine (1 of 3 - 3-dose series)    Varicella vaccine (1 of 2 - 2-dose childhood series)    HIV screen     Hepatitis C screen     DTaP/Tdap/Td vaccine (1 - Tdap)    Cervical cancer screen     COVID-19 Vaccine (3 - 2023-24 season)       Wt Readings from Last 3 Encounters:   24 72.6 kg (160 lb)   24 72.6 kg (160 lb)   23 72.6 kg (160 lb)     Temp Readings from Last 3 Encounters:   24 97.6 °F (36.4 °C) (Temporal)   23 97.9 °F (36.6 °C) (Temporal)   23 97.8 °F (36.6 °C) (Skin)     BP Readings from Last 3 Encounters:   24 (!) 132/93   23 136/82   23 120/83     Pulse Readings from Last 3 Encounters:   24 98   24 88   23 (!) 102           Coordination of Care Questionnaire:  :   1. \"Have you been to the ER, urgent care clinic since your last visit?  Hospitalized since your last visit?\" no    2. \"Have you seen or consulted any other health care providers outside of the [facility] since your last visit?\" no     3. For patients aged 45-75: Has the patient had a colonoscopy / FIT/ Cologuard? no      If the patient is female:    4. For patients aged 40-74: Has the patient had a mammogram within the past 2 years? no      5. For patients aged 21-65: Has the patient had a pap smear? no     3) Do you have an Advance Directive on file? no  Are you interested in receiving information about Advance Directives? no    Patient is accompanied by Mother I have received verbal consent from Marcela Lawson to discuss any/all medical information while they are present in the room.   
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; right side of brain; 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;   • Neuropathy     lowe limbs Dx by neurologist   • NSAID induced gastritis    • Peptic ulcer    • Peptic ulcer disease       Social History       Tobacco History       Smoking Status  Never      Smokeless Tobacco Use  Never              Alcohol History       Alcohol Use Status  No              Drug Use       Drug Use Status  No              Sexual Activity       Sexually Active  Never                   Past Surgical History:   Procedure Laterality Date   • CHOLECYSTECTOMY     • FEMUR FRACTURE SURGERY       No relevant family history has been documented for this patient.     Medication Refill  Pertinent negatives include no coughing.       ROS:   Review of Systems   Constitutional: Negative.  Negative for activity change.   HENT: Negative.     Eyes: Negative.    Respiratory: Negative.  Negative for cough and shortness of breath.    Cardiovascular: Negative.    Gastrointestinal: Negative.    Endocrine: Negative.    Genitourinary: Negative.    Musculoskeletal:  Positive for gait problem.   Allergic/Immunologic: Negative.    Hematological: Negative.    Psychiatric/Behavioral: Negative.         Objective:   BP (!) 132/93 (Site: Right Upper Arm, Position: Sitting, Cuff Size: Medium Adult)   Pulse 98   Temp 97.6 °F (36.4 °C) (Temporal)   Resp 20   Ht 1.549 m (5' 1\")   Wt 72.6 kg (160 lb)   SpO2 95%   BMI 30.23 kg/m²       Vitals and Nurse Documentation reviewed.     Physical Exam  Vitals and nursing note reviewed.   Constitutional:       Appearance:

## 2024-08-24 DIAGNOSIS — R11.0 NAUSEA: ICD-10-CM

## 2024-08-27 RX ORDER — ONDANSETRON 4 MG/1
4 TABLET, FILM COATED ORAL EVERY 8 HOURS PRN
Qty: 30 TABLET | Refills: 1 | Status: SHIPPED | OUTPATIENT
Start: 2024-08-27

## 2024-09-01 DIAGNOSIS — M62.838 OTHER MUSCLE SPASM: ICD-10-CM

## 2024-09-03 RX ORDER — METHOCARBAMOL 500 MG/1
TABLET, FILM COATED ORAL
Qty: 90 TABLET | Refills: 2 | Status: SHIPPED | OUTPATIENT
Start: 2024-09-03

## 2024-09-03 NOTE — TELEPHONE ENCOUNTER
PCP: Adriel Antony, APRN - NP    Last appt: [unfilled]  No future appointments.    Requested Prescriptions     Pending Prescriptions Disp Refills    methocarbamol (ROBAXIN) 500 MG tablet [Pharmacy Med Name: METHOCARBAMOL 500 MG TABLET] 90 tablet 2     Sig: TAKE 1 TABLET BY MOUTH THREE (3) TIMES DAILY. INDICATIONS: MUSCLE SPASM       Prior labs and Blood pressures:  BP Readings from Last 3 Encounters:   06/25/24 (!) 132/93   12/21/23 136/82   09/18/23 120/83     Lab Results   Component Value Date/Time     09/18/2023 03:09 PM    K 4.0 09/18/2023 03:09 PM     09/18/2023 03:09 PM    CO2 23 09/18/2023 03:09 PM    BUN 10 09/18/2023 03:09 PM     No results found for: \"HBA1C\", \"[id number]\"  Lab Results   Component Value Date/Time    CHOL 179 09/18/2023 03:09 PM    HDL 17 09/18/2023 03:09 PM    LDL 85 09/18/2023 03:09 PM    VLDL 77 09/18/2023 03:09 PM    VLDL 43 04/12/2022 10:53 AM     No results found for: \"VITD3\"    Lab Results   Component Value Date/Time    TSH 0.97 09/18/2023 03:09 PM

## 2024-12-28 DIAGNOSIS — M62.838 OTHER MUSCLE SPASM: ICD-10-CM

## 2024-12-30 ENCOUNTER — TELEPHONE (OUTPATIENT)
Facility: CLINIC | Age: 34
End: 2024-12-30

## 2024-12-30 RX ORDER — METHOCARBAMOL 500 MG/1
TABLET, FILM COATED ORAL
Qty: 30 TABLET | Refills: 0 | Status: SHIPPED | OUTPATIENT
Start: 2024-12-30 | End: 2024-12-31 | Stop reason: SDUPTHER

## 2024-12-31 ENCOUNTER — OFFICE VISIT (OUTPATIENT)
Facility: CLINIC | Age: 34
End: 2024-12-31
Payer: MEDICAID

## 2024-12-31 VITALS
RESPIRATION RATE: 16 BRPM | OXYGEN SATURATION: 95 % | BODY MASS INDEX: 29.27 KG/M2 | WEIGHT: 155 LBS | HEIGHT: 61 IN | HEART RATE: 88 BPM | TEMPERATURE: 97.8 F | DIASTOLIC BLOOD PRESSURE: 92 MMHG | SYSTOLIC BLOOD PRESSURE: 144 MMHG

## 2024-12-31 DIAGNOSIS — H65.93 MIDDLE EAR EFFUSION, BILATERAL: ICD-10-CM

## 2024-12-31 DIAGNOSIS — R60.0 PEDAL EDEMA: ICD-10-CM

## 2024-12-31 DIAGNOSIS — J30.89 NON-SEASONAL ALLERGIC RHINITIS, UNSPECIFIED TRIGGER: ICD-10-CM

## 2024-12-31 DIAGNOSIS — M62.838 OTHER MUSCLE SPASM: ICD-10-CM

## 2024-12-31 DIAGNOSIS — I10 ESSENTIAL (PRIMARY) HYPERTENSION: Primary | ICD-10-CM

## 2024-12-31 PROBLEM — G43.909 MIGRAINE HEADACHE: Status: ACTIVE | Noted: 2024-12-31

## 2024-12-31 PROCEDURE — 3080F DIAST BP >= 90 MM HG: CPT | Performed by: NURSE PRACTITIONER

## 2024-12-31 PROCEDURE — 99214 OFFICE O/P EST MOD 30 MIN: CPT | Performed by: NURSE PRACTITIONER

## 2024-12-31 PROCEDURE — 3077F SYST BP >= 140 MM HG: CPT | Performed by: NURSE PRACTITIONER

## 2024-12-31 RX ORDER — POLYVINYL ALCOHOL 14 MG/ML
1 SOLUTION/ DROPS OPHTHALMIC
COMMUNITY
Start: 2024-02-15

## 2024-12-31 RX ORDER — DIVALPROEX SODIUM 250 MG/1
TABLET, FILM COATED, EXTENDED RELEASE ORAL
COMMUNITY
Start: 2024-07-19

## 2024-12-31 RX ORDER — ACETAMINOPHEN 500 MG
TABLET ORAL
COMMUNITY

## 2024-12-31 RX ORDER — FLUTICASONE FUROATE 27.5 UG/1
SPRAY, METERED NASAL
Qty: 1 EACH | Refills: 2 | Status: SHIPPED | OUTPATIENT
Start: 2024-12-31

## 2024-12-31 RX ORDER — ECHINACEA PURPUREA EXTRACT 125 MG
TABLET ORAL
COMMUNITY
Start: 2024-02-15

## 2024-12-31 RX ORDER — DILTIAZEM HCL 60 MG
60 TABLET ORAL DAILY
Qty: 30 TABLET | Refills: 2 | Status: SHIPPED | OUTPATIENT
Start: 2024-12-31

## 2024-12-31 RX ORDER — OMEPRAZOLE 20 MG/1
TABLET, DELAYED RELEASE ORAL
COMMUNITY

## 2024-12-31 RX ORDER — METHOCARBAMOL 500 MG/1
TABLET, FILM COATED ORAL
Qty: 270 TABLET | Refills: 1 | Status: SHIPPED | OUTPATIENT
Start: 2024-12-31

## 2024-12-31 RX ORDER — NAPROXEN SODIUM 220 MG/1
TABLET, FILM COATED ORAL
COMMUNITY

## 2024-12-31 RX ORDER — DIPHENHYDRAMINE HCL 25 MG
TABLET ORAL
COMMUNITY

## 2024-12-31 ASSESSMENT — ENCOUNTER SYMPTOMS
CHEST TIGHTNESS: 0
SHORTNESS OF BREATH: 0

## 2024-12-31 NOTE — PROGRESS NOTES
\"Have you been to the ER, urgent care clinic since your last visit?  Hospitalized since your last visit?\"    no    “Have you seen or consulted any other health care providers outside our system since your last visit?”    Yes, Neurologist and OBGYN     “Have you had a pap smear?”    no    No cervical cancer screening on file             
mouth 2 times daily 6/25/24  Yes Adriel Antony APRN - NP   Ubrogepant (UBRELVY) 100 MG TABS Take by mouth   Yes Provider, MD Valentine   gabapentin (NEURONTIN) 400 MG capsule Take 1 capsule by mouth 2 times daily. 8/18/23  Yes Provider, MD Valentine   hydrOXYzine HCl (ATARAX) 10 MG tablet Take 1 tablet by mouth every 8 hours as needed for Itching 9/18/23  Yes Adriel Antony APRN - NP   albuterol sulfate HFA (PROVENTIL;VENTOLIN;PROAIR) 108 (90 Base) MCG/ACT inhaler Inhale 1 puff into the lungs every 4 hours as needed 7/28/20  Yes Automatic Reconciliation, Ar   dicyclomine (BENTYL) 20 MG tablet Take 1 tablet by mouth every 6 hours as needed 1/18/22  Yes Automatic Reconciliation, Ar   eletriptan (RELPAX) 40 MG tablet Take 1 tablet by mouth once as needed   Yes Automatic Reconciliation, Ar   Erenumab-aooe (AIMOVIG) 140 MG/ML SOAJ INJECT 1 SYRINGE ONCE MONTHLY 12/30/20  Yes Automatic Reconciliation, Ar   fexofenadine (ALLEGRA) 180 MG tablet Take by mouth   Yes Automatic Reconciliation, Ar   Levonorgest-Eth Estrad 91-Day 0.15-0.03 &0.01 MG TABS Take 1 tablet by mouth daily 2/7/22  Yes Automatic Reconciliation, Ar   potassium citrate (UROCIT-K) 5 MEQ (540 MG) extended release tablet Take 1 tablet by mouth daily 10/12/18  Yes Automatic Reconciliation, Ar   sodium bicarbonate 650 MG tablet TAKE 1 TABLET BY MOUTH AFTER MEALS AS DIRECTED 10/11/18  Yes Automatic Reconciliation, Ar        Allergies   Allergen Reactions    Fremanezumab-Vfrm Cough, Shortness Of Breath and Dizziness or Vertigo    Etodolac Other (See Comments)     Headache    Naproxen Other (See Comments)    Nitrofurantoin Monohyd Macro Other (See Comments)    Pseudoephedrine-Ibuprofen Other (See Comments)    Topiramate Other (See Comments)     Abdominal pain, Lactic Acidosis    Nsaids Nausea And Vomiting       Vitals:    12/31/24 0937   BP: (!) 144/92   Site: Left Upper Arm   Position: Sitting   Cuff Size: Small Adult   Pulse: 88   Resp: 16   Temp: 97.8 °F

## 2024-12-31 NOTE — ASSESSMENT & PLAN NOTE
Will discontinue amlodipine 5 mg due to pedal edema  Start diltiazem 60 mg once daily  Continue metoprolol 25 mg  Continue to monitor blood pressure at home  Follow-up in 2 weeks

## 2025-02-08 DIAGNOSIS — I10 ESSENTIAL (PRIMARY) HYPERTENSION: ICD-10-CM

## 2025-02-11 RX ORDER — METOPROLOL TARTRATE 25 MG/1
25 TABLET, FILM COATED ORAL 2 TIMES DAILY
Qty: 60 TABLET | Refills: 0 | Status: SHIPPED | OUTPATIENT
Start: 2025-02-11

## 2025-03-11 DIAGNOSIS — I10 ESSENTIAL (PRIMARY) HYPERTENSION: ICD-10-CM

## 2025-03-11 RX ORDER — METOPROLOL TARTRATE 25 MG/1
25 TABLET, FILM COATED ORAL 2 TIMES DAILY
Qty: 60 TABLET | Refills: 0 | OUTPATIENT
Start: 2025-03-11

## 2025-03-14 DIAGNOSIS — I10 ESSENTIAL (PRIMARY) HYPERTENSION: ICD-10-CM

## 2025-03-14 RX ORDER — METOPROLOL TARTRATE 25 MG/1
25 TABLET, FILM COATED ORAL 2 TIMES DAILY
Qty: 60 TABLET | Refills: 0 | OUTPATIENT
Start: 2025-03-14

## 2025-03-14 NOTE — TELEPHONE ENCOUNTER
Patient mother contacted the office requesting refill on BL medication, states patient is out of the medication.

## 2025-03-17 ENCOUNTER — OFFICE VISIT (OUTPATIENT)
Facility: CLINIC | Age: 35
End: 2025-03-17
Payer: MEDICAID

## 2025-03-17 VITALS
HEART RATE: 76 BPM | OXYGEN SATURATION: 96 % | SYSTOLIC BLOOD PRESSURE: 138 MMHG | DIASTOLIC BLOOD PRESSURE: 89 MMHG | WEIGHT: 158 LBS | BODY MASS INDEX: 29.83 KG/M2 | TEMPERATURE: 98.2 F | RESPIRATION RATE: 16 BRPM | HEIGHT: 61 IN

## 2025-03-17 DIAGNOSIS — I10 ESSENTIAL (PRIMARY) HYPERTENSION: Primary | ICD-10-CM

## 2025-03-17 DIAGNOSIS — I10 ESSENTIAL (PRIMARY) HYPERTENSION: ICD-10-CM

## 2025-03-17 DIAGNOSIS — R11.0 NAUSEA: ICD-10-CM

## 2025-03-17 DIAGNOSIS — R60.0 PEDAL EDEMA: ICD-10-CM

## 2025-03-17 PROCEDURE — 3079F DIAST BP 80-89 MM HG: CPT | Performed by: NURSE PRACTITIONER

## 2025-03-17 PROCEDURE — 99214 OFFICE O/P EST MOD 30 MIN: CPT | Performed by: NURSE PRACTITIONER

## 2025-03-17 PROCEDURE — 3075F SYST BP GE 130 - 139MM HG: CPT | Performed by: NURSE PRACTITIONER

## 2025-03-17 RX ORDER — DILTIAZEM HYDROCHLORIDE 120 MG/1
120 CAPSULE, COATED, EXTENDED RELEASE ORAL DAILY
Qty: 30 CAPSULE | Refills: 2 | Status: SHIPPED | OUTPATIENT
Start: 2025-03-17

## 2025-03-17 RX ORDER — METOPROLOL TARTRATE 25 MG/1
25 TABLET, FILM COATED ORAL 2 TIMES DAILY
Qty: 180 TABLET | Refills: 1 | Status: SHIPPED | OUTPATIENT
Start: 2025-03-17

## 2025-03-17 RX ORDER — ONDANSETRON 4 MG/1
4 TABLET, FILM COATED ORAL EVERY 8 HOURS PRN
Qty: 30 TABLET | Refills: 2 | Status: SHIPPED | OUTPATIENT
Start: 2025-03-17

## 2025-03-17 RX ORDER — DILTIAZEM HCL 60 MG
60 TABLET ORAL DAILY
Qty: 30 TABLET | Refills: 2 | Status: CANCELLED | OUTPATIENT
Start: 2025-03-17

## 2025-03-17 SDOH — ECONOMIC STABILITY: FOOD INSECURITY: WITHIN THE PAST 12 MONTHS, THE FOOD YOU BOUGHT JUST DIDN'T LAST AND YOU DIDN'T HAVE MONEY TO GET MORE.: NEVER TRUE

## 2025-03-17 SDOH — ECONOMIC STABILITY: FOOD INSECURITY: WITHIN THE PAST 12 MONTHS, YOU WORRIED THAT YOUR FOOD WOULD RUN OUT BEFORE YOU GOT MONEY TO BUY MORE.: NEVER TRUE

## 2025-03-17 ASSESSMENT — PATIENT HEALTH QUESTIONNAIRE - PHQ9
1. LITTLE INTEREST OR PLEASURE IN DOING THINGS: NOT AT ALL
2. FEELING DOWN, DEPRESSED OR HOPELESS: NOT AT ALL
SUM OF ALL RESPONSES TO PHQ QUESTIONS 1-9: 0

## 2025-03-17 NOTE — PROGRESS NOTES
1. Have you been to the ER, urgent care clinic since your last visit?  Hospitalized since your last visit?Yes When: 2/2025 Where: [place] [facility] Reason for visit: nausea / vomiting    2. Have you seen or consulted any other health care providers outside of the [facility] since your last visit?  Include any pap smears or colon screening. No    Chief Complaint   Patient presents with    Medication Refill    Blood Pressure Check       Health Maintenance Due   Topic Date Due    Varicella vaccine (1 of 2 - 13+ 2-dose series) Never done    HIV screen  Never done    Hepatitis C screen  Never done    Hepatitis B vaccine (1 of 3 - 19+ 3-dose series) Never done    Cervical cancer screen  Never done    DTaP/Tdap/Td vaccine (2 - Td or Tdap) 06/16/2023    Flu vaccine (1) 08/01/2024    COVID-19 Vaccine (3 - 2024-25 season) 09/01/2024     PHQ-9 Total Score: 0 (3/17/2025 11:04 AM)        3/17/2025    11:00 AM   AMB Abuse Screening   Do you ever feel afraid of your partner? N   Are you in a relationship with someone who physically or mentally threatens you? N   Is it safe for you to go home? Y     Vitals:    03/17/25 1103   BP: 138/89   Pulse: 76   Resp: 16   Temp: 98.2 °F (36.8 °C)   SpO2: 96%

## 2025-03-17 NOTE — PROGRESS NOTES
History of present illness:Marcela Lawson is a 34 y.o. female presenting for continued leg swelling    Marcela is here today with her Mom for medication follow up and continued ankle swelling. Amlodipine 5mg was discontinued at her last appointment without improvement. Now taking Diltiazem for hypertension. Home monitoring of blood pressure has been in the 120's/90's, not at goal.     GYN discontinued OCP June 2024. Now on progrestin only pill Slynd.    Takes Naproxen once a week for pain as needed    Requests refill of zofran for intermittent nausea with headaches.      Review of Systems   Constitutional:  Negative for fever.   Respiratory:  Negative for shortness of breath.    Cardiovascular:  Positive for leg swelling. Negative for chest pain.   Neurological:  Negative for dizziness.           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; right side of brain; 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;    Cerebrovascular disease August 1991    Chronic kidney disease     GERD (gastroesophageal reflux disease) August 2016    Headache 2006    Hypertension December 2020    Neuropathy     lowe limbs Dx by neurologist    NSAID induced gastritis     Peptic ulcer     Peptic ulcer disease      Past Surgical History:   Procedure Laterality Date    CHOLECYSTECTOMY      FEMUR FRACTURE SURGERY       Family History   Problem Relation Age of Onset    Hypertension Mother     High Blood Pressure Mother        Prior to Admission medications    Medication Sig Start Date End Date Taking? Authorizing Provider   Drospirenone (SLYND PO) Take by mouth Migraines   Yes Provider, MD Valentine   ondansetron (ZOFRAN) 4 MG tablet Take 1 tablet by mouth every 8 hours as needed for Nausea 3/17/25  Yes Jaida Obrien, APRN - CNP   metoprolol tartrate (LOPRESSOR) 25 MG tablet

## 2025-03-25 ASSESSMENT — ENCOUNTER SYMPTOMS: SHORTNESS OF BREATH: 0

## 2025-03-25 NOTE — ASSESSMENT & PLAN NOTE
Rx diltiazem 120 mg daily (increased from 60mg)  Continue metoprolol 25 mg  Continue to monitor blood pressure at home  Follow-up in 4 weeks

## 2025-04-17 ENCOUNTER — OFFICE VISIT (OUTPATIENT)
Facility: CLINIC | Age: 35
End: 2025-04-17
Payer: MEDICAID

## 2025-04-17 VITALS
HEIGHT: 61 IN | DIASTOLIC BLOOD PRESSURE: 91 MMHG | WEIGHT: 158 LBS | RESPIRATION RATE: 14 BRPM | HEART RATE: 78 BPM | BODY MASS INDEX: 29.83 KG/M2 | SYSTOLIC BLOOD PRESSURE: 129 MMHG | OXYGEN SATURATION: 96 %

## 2025-04-17 DIAGNOSIS — I10 ESSENTIAL (PRIMARY) HYPERTENSION: ICD-10-CM

## 2025-04-17 DIAGNOSIS — J30.89 NON-SEASONAL ALLERGIC RHINITIS, UNSPECIFIED TRIGGER: Primary | ICD-10-CM

## 2025-04-17 PROCEDURE — 3080F DIAST BP >= 90 MM HG: CPT | Performed by: NURSE PRACTITIONER

## 2025-04-17 PROCEDURE — 99214 OFFICE O/P EST MOD 30 MIN: CPT | Performed by: NURSE PRACTITIONER

## 2025-04-17 PROCEDURE — 3074F SYST BP LT 130 MM HG: CPT | Performed by: NURSE PRACTITIONER

## 2025-04-17 RX ORDER — LISINOPRIL 10 MG/1
10 TABLET ORAL DAILY
Qty: 30 TABLET | Refills: 2 | Status: SHIPPED | OUTPATIENT
Start: 2025-04-17

## 2025-04-17 ASSESSMENT — ENCOUNTER SYMPTOMS: SHORTNESS OF BREATH: 0

## 2025-04-17 NOTE — PROGRESS NOTES
Marcela Lawson is a 34 y.o. female  Chief Complaint   Patient presents with    Referral - General     Allergist referral     Vitals:    04/17/25 1054 04/17/25 1103   BP: (!) 128/95 (!) 129/91   BP Site: Left Upper Arm Left Upper Arm   Patient Position: Sitting Sitting   Pulse: 78    Resp: 14    SpO2: 96%    Weight: 71.7 kg (158 lb)    Height: 1.549 m (5' 1\")          Health Maintenance Due   Topic Date Due    Varicella vaccine (1 of 2 - 13+ 2-dose series) Never done    HIV screen  Never done    Hepatitis C screen  Never done    Hepatitis B vaccine (1 of 3 - 19+ 3-dose series) Never done    Cervical cancer screen  Never done    DTaP/Tdap/Td vaccine (2 - Td or Tdap) 06/16/2023    COVID-19 Vaccine (3 - 2024-25 season) 09/01/2024         \"Have you been to the ER, urgent care clinic since your last visit?  Hospitalized since your last visit?\"    NO    “Have you seen or consulted any other health care providers outside of [facility] since your last visit?”    NO        “Have you had a pap smear?”    NO    No cervical cancer screening on file

## 2025-04-17 NOTE — ASSESSMENT & PLAN NOTE
Continue Allegra and Flonase Sensimist daily.  Continue saline nasal spray, Benadryl, Dimetapp as needed.  Recommended avoiding decongestants due to hypertension.  Patient provided with allergist referral

## 2025-04-17 NOTE — ASSESSMENT & PLAN NOTE
Rx lisinopril 10 mg  Continue diltiazem 120 mg and metoprolol 25 mg  Continue to monitor blood pressure at home  Follow-up in 2 weeks

## 2025-04-17 NOTE — PROGRESS NOTES
History of present illness:Marcela Lawson is a 34 y.o. female presenting for allergies    Ms. Lawson is here today with her Mom for an allergist referral.   She is taking Flonase, Allegra and using saline nasal spray. She is still having sinus headaches that progress to migraine headaches. Taking Benadryl as needed, 1-2 times every week. Dimetapp about 3x/week as needed and if no improvement still will try Sinex.     CKD:  Needs to schedule follow-up appointment with Nephrologist.  Has not yet completed lab ordered at last appointment    HTN:  Taking diltiazem 120mg  Metoprolol 25mg  BP not at goal  Average home reading 120's/80-90's    Review of Systems   HENT:  Positive for congestion.    Respiratory:  Negative for shortness of breath.    Cardiovascular:  Negative for chest pain and palpitations.   Neurological:  Positive for headaches.           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; right side of brain; 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;    Cerebrovascular disease August 1991    Chronic kidney disease     GERD (gastroesophageal reflux disease) August 2016    Headache 2006    Hypertension December 2020    Neuropathy     lowe limbs Dx by neurologist    NSAID induced gastritis     Peptic ulcer     Peptic ulcer disease      Past Surgical History:   Procedure Laterality Date    CHOLECYSTECTOMY      FEMUR FRACTURE SURGERY       Family History   Problem Relation Age of Onset    Hypertension Mother     High Blood Pressure Mother        Prior to Admission medications    Medication Sig Start Date End Date Taking? Authorizing Provider   lisinopril (PRINIVIL;ZESTRIL) 10 MG tablet Take 1 tablet by mouth daily 4/17/25  Yes Jaida Obrien, APRN - CNP   Drospirenone (SLYND PO) Take by mouth Migraines   Yes Provider, MD Valentine   ondansetron

## 2025-05-06 ENCOUNTER — TELEMEDICINE (OUTPATIENT)
Facility: CLINIC | Age: 35
End: 2025-05-06
Payer: MEDICAID

## 2025-05-06 DIAGNOSIS — F41.9 ANXIETY: Primary | ICD-10-CM

## 2025-05-06 DIAGNOSIS — F41.1 GENERALIZED ANXIETY DISORDER: ICD-10-CM

## 2025-05-06 DIAGNOSIS — R63.5 WEIGHT GAIN: ICD-10-CM

## 2025-05-06 PROCEDURE — 99214 OFFICE O/P EST MOD 30 MIN: CPT | Performed by: NURSE PRACTITIONER

## 2025-05-06 NOTE — PROGRESS NOTES
Chief Complaint   Patient presents with    Follow-up     Have you been to the ER, urgent care clinic since your last visit?  Hospitalized since your last visit?   NO    Have you seen or consulted any other health care providers outside our system since your last visit?   NO     “Have you had a pap smear?”    NO    No cervical cancer screening on file              
is managing her symptoms well.  She is also having difficulty sleeping.  Marcela is also concerned for weight gain.  She is interested in medication to help her lose weight.  She is working on making diet changes as well as her endurance to stand so that she can return to physical therapy    Review of Systems   Constitutional:  Positive for unexpected weight change. Negative for chills and fever.   Respiratory:  Negative for shortness of breath.    Cardiovascular:  Negative for chest pain.   Psychiatric/Behavioral:  Negative for dysphoric mood. The patient is nervous/anxious.           Objective   Patient-Reported Vitals  Patient-Reported Systolic (Top): 117 mmHg  Patient-Reported Diastolic (Bottom): 79 mmHg  Patient-Reported Pulse: 80  Patient-Reported Weight: 158  Patient-Reported Height: 5’1”       Physical Exam    [INSTRUCTIONS:  \"[x]\" Indicates a positive item  \"[]\" Indicates a negative item  -- DELETE ALL ITEMS NOT EXAMINED]    Constitutional: [x] Appears well-developed and well-nourished [x] No apparent distress      [] Abnormal -     Mental status: [x] Alert and awake  [x] Oriented to person/place/time [x] Able to follow commands    [] Abnormal -     Eyes:   EOM    [x]  Normal    [] Abnormal -   Sclera  [x]  Normal    [] Abnormal -          Discharge [x]  None visible   [] Abnormal -     HENT: [x] Normocephalic, atraumatic  [] Abnormal -   [x] Mouth/Throat: Mucous membranes are moist    External Ears [x] Normal  [] Abnormal -    Neck: [x] No visualized mass [] Abnormal -     Pulmonary/Chest: [x] Respiratory effort normal   [x] No visualized signs of difficulty breathing or respiratory distress        [] Abnormal -      Musculoskeletal:   [x] Normal gait with no signs of ataxia         [x] Normal range of motion of neck        [] Abnormal -     Neurological:        [x] No Facial Asymmetry (Cranial nerve 7 motor function) (limited exam due to video visit)          [x] No gaze palsy        [] Abnormal -

## 2025-05-07 PROBLEM — F41.9 ANXIETY: Status: ACTIVE | Noted: 2025-05-07

## 2025-05-07 RX ORDER — BUPROPION HYDROCHLORIDE 150 MG/1
150 TABLET ORAL EVERY MORNING
Qty: 30 TABLET | Refills: 2 | Status: SHIPPED | OUTPATIENT
Start: 2025-05-07

## 2025-05-07 RX ORDER — HYDROXYZINE HYDROCHLORIDE 25 MG/1
25 TABLET, FILM COATED ORAL EVERY 8 HOURS PRN
Qty: 30 TABLET | Refills: 2 | Status: SHIPPED | OUTPATIENT
Start: 2025-05-07

## 2025-05-07 ASSESSMENT — ENCOUNTER SYMPTOMS: SHORTNESS OF BREATH: 0

## 2025-05-08 NOTE — ASSESSMENT & PLAN NOTE
Rx Wellbutrin XL 150mg  Rx hydroxyzine 25 mg as needed for anxiety or sleep  Recommended counseling  Follow-up in 4 to 6 weeks

## 2025-06-12 DIAGNOSIS — I10 ESSENTIAL (PRIMARY) HYPERTENSION: ICD-10-CM

## 2025-06-12 RX ORDER — DILTIAZEM HYDROCHLORIDE 120 MG/1
CAPSULE, COATED, EXTENDED RELEASE ORAL DAILY
Qty: 30 CAPSULE | Refills: 1 | Status: SHIPPED | OUTPATIENT
Start: 2025-06-12

## 2025-06-12 NOTE — TELEPHONE ENCOUNTER
Chief Complaint   Patient presents with    Medication Refill       Requested Prescriptions     Pending Prescriptions Disp Refills    dilTIAZem (CARDIZEM CD) 120 MG extended release capsule [Pharmacy Med Name: DILTIAZEM 24H ER(CD) 120 MG CP] 30 capsule 2     Sig: TAKE 1 CAPSULE BY MOUTH EVERY DAY       Allergies:  Allergies   Allergen Reactions    Fremanezumab-Vfrm Cough, Shortness Of Breath and Dizziness or Vertigo    Etodolac Other (See Comments)     Headache    Naproxen Other (See Comments)    Nitrofurantoin     Nitrofurantoin Monohyd Macro Other (See Comments)    Pseudoephedrine-Ibuprofen Other (See Comments)    Topiramate Other (See Comments)     Abdominal pain, Lactic Acidosis    Nsaids Nausea And Vomiting       Last visit with clinic:  5/6/2025   Next visit with clinic: Visit date not found     Last visit with this provider: 5/6/2025   Next Visit with this provider: Visit date not found    Signed by Saadia Romero MA CMA  06/12/25  7:40 AM

## 2025-07-14 ENCOUNTER — TELEPHONE (OUTPATIENT)
Facility: CLINIC | Age: 35
End: 2025-07-14

## 2025-07-14 NOTE — TELEPHONE ENCOUNTER
Called left vm advising of refusing refill for Lisinopril due to labs not being completed and follow up appt not being scheduled. Provided c/b #.

## 2025-07-22 DIAGNOSIS — M62.838 OTHER MUSCLE SPASM: ICD-10-CM

## 2025-07-22 RX ORDER — METHOCARBAMOL 500 MG/1
TABLET, FILM COATED ORAL
Qty: 90 TABLET | Refills: 0 | Status: SHIPPED | OUTPATIENT
Start: 2025-07-22

## 2025-07-22 NOTE — TELEPHONE ENCOUNTER
Chief Complaint   Patient presents with    Medication Refill       Requested Prescriptions     Pending Prescriptions Disp Refills    methocarbamol (ROBAXIN) 500 MG tablet [Pharmacy Med Name: METHOCARBAMOL 500 MG TABLET] 90 tablet 5     Sig: TAKE 1 TABLET BY MOUTH THREE (3) TIMES DAILY. INDICATIONS: MUSCLE SPASM       Allergies:  Allergies   Allergen Reactions    Fremanezumab-Vfrm Cough, Shortness Of Breath and Dizziness or Vertigo    Etodolac Other (See Comments)     Headache    Naproxen Other (See Comments)    Nitrofurantoin     Nitrofurantoin Monohyd Macro Other (See Comments)    Pseudoephedrine-Ibuprofen Other (See Comments)    Topiramate Other (See Comments)     Abdominal pain, Lactic Acidosis    Nsaids Nausea And Vomiting       Last visit with clinic:  5/6/2025   Next visit with clinic: Visit date not found     Last visit with this provider: 5/6/2025   Next Visit with this provider: Visit date not found    Signed by Saadia Romero MA CMA  07/22/25  8:34 AM

## 2025-07-24 LAB
ALBUMIN SERPL-MCNC: 4.7 G/DL (ref 3.9–4.9)
ALP SERPL-CCNC: 112 IU/L (ref 44–121)
ALT SERPL-CCNC: 16 IU/L (ref 0–32)
AST SERPL-CCNC: 13 IU/L (ref 0–40)
BILIRUB SERPL-MCNC: 0.3 MG/DL (ref 0–1.2)
BUN SERPL-MCNC: 10 MG/DL (ref 6–20)
BUN/CREAT SERPL: 14 (ref 9–23)
CALCIUM SERPL-MCNC: 9.7 MG/DL (ref 8.7–10.2)
CHLORIDE SERPL-SCNC: 107 MMOL/L (ref 96–106)
CO2 SERPL-SCNC: 20 MMOL/L (ref 20–29)
CREAT SERPL-MCNC: 0.7 MG/DL (ref 0.57–1)
EGFRCR SERPLBLD CKD-EPI 2021: 116 ML/MIN/1.73
GLOBULIN SER CALC-MCNC: 2 G/DL (ref 1.5–4.5)
GLUCOSE SERPL-MCNC: 85 MG/DL (ref 70–99)
POTASSIUM SERPL-SCNC: 4.5 MMOL/L (ref 3.5–5.2)
PROT SERPL-MCNC: 6.7 G/DL (ref 6–8.5)
SODIUM SERPL-SCNC: 143 MMOL/L (ref 134–144)

## 2025-07-31 ENCOUNTER — OFFICE VISIT (OUTPATIENT)
Facility: CLINIC | Age: 35
End: 2025-07-31
Payer: MEDICAID

## 2025-07-31 VITALS
HEART RATE: 69 BPM | BODY MASS INDEX: 30.58 KG/M2 | DIASTOLIC BLOOD PRESSURE: 80 MMHG | TEMPERATURE: 98.3 F | HEIGHT: 61 IN | RESPIRATION RATE: 16 BRPM | SYSTOLIC BLOOD PRESSURE: 110 MMHG | OXYGEN SATURATION: 96 % | WEIGHT: 162 LBS

## 2025-07-31 DIAGNOSIS — F41.9 ANXIETY: ICD-10-CM

## 2025-07-31 DIAGNOSIS — I10 ESSENTIAL (PRIMARY) HYPERTENSION: Primary | ICD-10-CM

## 2025-07-31 DIAGNOSIS — R10.9 ABDOMINAL CRAMPING: ICD-10-CM

## 2025-07-31 DIAGNOSIS — M79.672 LEFT FOOT PAIN: ICD-10-CM

## 2025-07-31 DIAGNOSIS — M62.838 OTHER MUSCLE SPASM: ICD-10-CM

## 2025-07-31 PROCEDURE — 99214 OFFICE O/P EST MOD 30 MIN: CPT | Performed by: NURSE PRACTITIONER

## 2025-07-31 PROCEDURE — 3074F SYST BP LT 130 MM HG: CPT | Performed by: NURSE PRACTITIONER

## 2025-07-31 PROCEDURE — 3079F DIAST BP 80-89 MM HG: CPT | Performed by: NURSE PRACTITIONER

## 2025-07-31 RX ORDER — BUPROPION HYDROCHLORIDE 150 MG/1
150 TABLET ORAL EVERY MORNING
Qty: 90 TABLET | Refills: 1 | Status: SHIPPED | OUTPATIENT
Start: 2025-07-31

## 2025-07-31 RX ORDER — METOPROLOL TARTRATE 25 MG/1
25 TABLET, FILM COATED ORAL 2 TIMES DAILY
Qty: 180 TABLET | Refills: 1 | Status: SHIPPED | OUTPATIENT
Start: 2025-07-31

## 2025-07-31 RX ORDER — DILTIAZEM HYDROCHLORIDE 120 MG/1
120 CAPSULE, COATED, EXTENDED RELEASE ORAL DAILY
Qty: 90 CAPSULE | Refills: 1 | Status: SHIPPED | OUTPATIENT
Start: 2025-07-31

## 2025-07-31 RX ORDER — LISINOPRIL 10 MG/1
10 TABLET ORAL DAILY
Qty: 90 TABLET | Refills: 1 | Status: SHIPPED | OUTPATIENT
Start: 2025-07-31

## 2025-07-31 RX ORDER — HYDROXYZINE HYDROCHLORIDE 25 MG/1
25 TABLET, FILM COATED ORAL EVERY 8 HOURS PRN
Qty: 30 TABLET | Refills: 1 | Status: SHIPPED | OUTPATIENT
Start: 2025-07-31

## 2025-07-31 RX ORDER — DICYCLOMINE HCL 20 MG
20 TABLET ORAL DAILY PRN
Qty: 90 TABLET | Refills: 0 | Status: SHIPPED | OUTPATIENT
Start: 2025-07-31

## 2025-07-31 RX ORDER — METHOCARBAMOL 500 MG/1
TABLET, FILM COATED ORAL
Qty: 270 TABLET | Refills: 0 | Status: SHIPPED | OUTPATIENT
Start: 2025-07-31